# Patient Record
Sex: FEMALE | Race: BLACK OR AFRICAN AMERICAN | Employment: UNEMPLOYED | URBAN - METROPOLITAN AREA
[De-identification: names, ages, dates, MRNs, and addresses within clinical notes are randomized per-mention and may not be internally consistent; named-entity substitution may affect disease eponyms.]

---

## 2023-12-26 ENCOUNTER — HOSPITAL ENCOUNTER (EMERGENCY)
Age: 38
Discharge: HOME | End: 2023-12-26
Attending: EMERGENCY MEDICINE

## 2023-12-26 ENCOUNTER — APPOINTMENT (OUTPATIENT)
Dept: GENERAL RADIOLOGY | Age: 38
End: 2023-12-26

## 2023-12-26 VITALS
OXYGEN SATURATION: 99 % | BODY MASS INDEX: 45 KG/M2 | WEIGHT: 280 LBS | TEMPERATURE: 100.2 F | RESPIRATION RATE: 20 BRPM | HEIGHT: 66 IN | DIASTOLIC BLOOD PRESSURE: 109 MMHG | SYSTOLIC BLOOD PRESSURE: 142 MMHG | HEART RATE: 88 BPM

## 2023-12-26 DIAGNOSIS — U07.1 COVID-19: Primary | ICD-10-CM

## 2023-12-26 PROCEDURE — 99283 EMERGENCY DEPT VISIT LOW MDM: CPT

## 2023-12-26 PROCEDURE — 71046 X-RAY EXAM CHEST 2 VIEWS: CPT

## 2023-12-26 RX ORDER — ALBUTEROL SULFATE 90 UG/1
2 AEROSOL, METERED RESPIRATORY (INHALATION) 4 TIMES DAILY PRN
Qty: 18 G | Refills: 0 | Status: SHIPPED | OUTPATIENT
Start: 2023-12-26

## 2023-12-27 NOTE — ED NOTES
12/27/23  Attempted to contact pt re: ED visit 12/26/23; message left, encouraged to return to ED if any problems or concerns.

## 2024-03-09 ENCOUNTER — HOSPITAL ENCOUNTER (EMERGENCY)
Age: 39
Discharge: HOME | End: 2024-03-10
Attending: EMERGENCY MEDICINE
Payer: COMMERCIAL

## 2024-03-09 ENCOUNTER — APPOINTMENT (OUTPATIENT)
Dept: CT IMAGING | Age: 39
End: 2024-03-09
Payer: COMMERCIAL

## 2024-03-09 DIAGNOSIS — N30.00 ACUTE CYSTITIS WITHOUT HEMATURIA: ICD-10-CM

## 2024-03-09 DIAGNOSIS — G40.909 SEIZURE DISORDER (HCC): ICD-10-CM

## 2024-03-09 DIAGNOSIS — R56.9 SEIZURE (HCC): Primary | ICD-10-CM

## 2024-03-09 LAB
ALBUMIN SERPL-MCNC: 4.2 G/DL (ref 3.4–5)
ALBUMIN/GLOB SERPL: 1.6 {RATIO} (ref 1.1–2.2)
ALP SERPL-CCNC: 98 U/L (ref 40–129)
ALT SERPL-CCNC: 14 U/L (ref 10–40)
ANION GAP SERPL CALCULATED.3IONS-SCNC: 12 MMOL/L (ref 3–16)
ANISOCYTOSIS BLD QL SMEAR: ABNORMAL
AST SERPL-CCNC: 24 U/L (ref 15–37)
BACTERIA URNS QL MICRO: ABNORMAL /HPF
BASOPHILS # BLD: 0.1 K/UL (ref 0–0.2)
BASOPHILS NFR BLD: 2 %
BILIRUB SERPL-MCNC: <0.2 MG/DL (ref 0–1)
BILIRUB UR QL STRIP.AUTO: NEGATIVE
BUN SERPL-MCNC: 8 MG/DL (ref 7–20)
CALCIUM SERPL-MCNC: 8.9 MG/DL (ref 8.3–10.6)
CHLORIDE SERPL-SCNC: 103 MMOL/L (ref 99–110)
CLARITY UR: ABNORMAL
CO2 SERPL-SCNC: 21 MMOL/L (ref 21–32)
COLOR UR: ABNORMAL
CREAT SERPL-MCNC: 0.7 MG/DL (ref 0.6–1.1)
DEPRECATED RDW RBC AUTO: 17.1 % (ref 12.4–15.4)
EOSINOPHIL # BLD: 0.1 K/UL (ref 0–0.6)
EOSINOPHIL NFR BLD: 2 %
EPI CELLS #/AREA URNS AUTO: 10 /HPF (ref 0–5)
ETHANOLAMINE SERPL-MCNC: NORMAL MG/DL (ref 0–0.08)
GFR SERPLBLD CREATININE-BSD FMLA CKD-EPI: >60 ML/MIN/{1.73_M2}
GLUCOSE SERPL-MCNC: 97 MG/DL (ref 70–99)
GLUCOSE UR STRIP.AUTO-MCNC: NEGATIVE MG/DL
HCG SERPL QL: NEGATIVE
HCT VFR BLD AUTO: 39.7 % (ref 36–48)
HGB BLD-MCNC: 13.5 G/DL (ref 12–16)
HGB UR QL STRIP.AUTO: NEGATIVE
HYALINE CASTS #/AREA URNS AUTO: 4 /LPF (ref 0–8)
KETONES UR STRIP.AUTO-MCNC: ABNORMAL MG/DL
LEUKOCYTE ESTERASE UR QL STRIP.AUTO: NEGATIVE
LYMPHOCYTES # BLD: 2.6 K/UL (ref 1–5.1)
LYMPHOCYTES NFR BLD: 40.7 %
MACROCYTES BLD QL SMEAR: ABNORMAL
MCH RBC QN AUTO: 27.9 PG (ref 26–34)
MCHC RBC AUTO-ENTMCNC: 34 G/DL (ref 31–36)
MCV RBC AUTO: 82 FL (ref 80–100)
MICROCYTES BLD QL SMEAR: ABNORMAL
MONOCYTES # BLD: 0.9 K/UL (ref 0–1.3)
MONOCYTES NFR BLD: 13.8 %
NEUTROPHILS # BLD: 2.6 K/UL (ref 1.7–7.7)
NEUTROPHILS NFR BLD: 41.5 %
NITRITE UR QL STRIP.AUTO: NEGATIVE
PH UR STRIP.AUTO: 5.5 [PH] (ref 5–8)
PLATELET # BLD AUTO: 218 K/UL (ref 135–450)
PLATELET BLD QL SMEAR: ADEQUATE
PMV BLD AUTO: 7.6 FL (ref 5–10.5)
POTASSIUM SERPL-SCNC: 5.2 MMOL/L (ref 3.5–5.1)
PROT SERPL-MCNC: 6.9 G/DL (ref 6.4–8.2)
PROT UR STRIP.AUTO-MCNC: ABNORMAL MG/DL
RBC # BLD AUTO: 4.85 M/UL (ref 4–5.2)
RBC CLUMPS #/AREA URNS AUTO: 3 /HPF (ref 0–4)
REASON FOR REJECTION: NORMAL
REJECTED TEST: NORMAL
SLIDE REVIEW: ABNORMAL
SODIUM SERPL-SCNC: 136 MMOL/L (ref 136–145)
SP GR UR STRIP.AUTO: >=1.03 (ref 1–1.03)
UA COMPLETE W REFLEX CULTURE PNL UR: YES
UA DIPSTICK W REFLEX MICRO PNL UR: YES
URN SPEC COLLECT METH UR: ABNORMAL
UROBILINOGEN UR STRIP-ACNC: 1 E.U./DL
WBC # BLD AUTO: 6.3 K/UL (ref 4–11)
WBC #/AREA URNS AUTO: 17 /HPF (ref 0–5)

## 2024-03-09 PROCEDURE — 36415 COLL VENOUS BLD VENIPUNCTURE: CPT

## 2024-03-09 PROCEDURE — 93005 ELECTROCARDIOGRAM TRACING: CPT | Performed by: EMERGENCY MEDICINE

## 2024-03-09 PROCEDURE — 84703 CHORIONIC GONADOTROPIN ASSAY: CPT

## 2024-03-09 PROCEDURE — 80307 DRUG TEST PRSMV CHEM ANLYZR: CPT

## 2024-03-09 PROCEDURE — 85025 COMPLETE CBC W/AUTO DIFF WBC: CPT

## 2024-03-09 PROCEDURE — 6360000002 HC RX W HCPCS: Performed by: EMERGENCY MEDICINE

## 2024-03-09 PROCEDURE — 80053 COMPREHEN METABOLIC PANEL: CPT

## 2024-03-09 PROCEDURE — 96374 THER/PROPH/DIAG INJ IV PUSH: CPT | Performed by: EMERGENCY MEDICINE

## 2024-03-09 PROCEDURE — 87086 URINE CULTURE/COLONY COUNT: CPT

## 2024-03-09 PROCEDURE — 80177 DRUG SCRN QUAN LEVETIRACETAM: CPT

## 2024-03-09 PROCEDURE — 70450 CT HEAD/BRAIN W/O DYE: CPT

## 2024-03-09 PROCEDURE — 99284 EMERGENCY DEPT VISIT MOD MDM: CPT | Performed by: EMERGENCY MEDICINE

## 2024-03-09 PROCEDURE — 84146 ASSAY OF PROLACTIN: CPT

## 2024-03-09 PROCEDURE — 72125 CT NECK SPINE W/O DYE: CPT

## 2024-03-09 PROCEDURE — 81001 URINALYSIS AUTO W/SCOPE: CPT

## 2024-03-09 PROCEDURE — 82077 ASSAY SPEC XCP UR&BREATH IA: CPT

## 2024-03-09 RX ORDER — GABAPENTIN 600 MG/1
900 TABLET ORAL
COMMUNITY

## 2024-03-09 RX ORDER — LORATADINE 10 MG/1
10 CAPSULE, LIQUID FILLED ORAL DAILY
COMMUNITY

## 2024-03-09 RX ORDER — LEVETIRACETAM 250 MG/1
1000 TABLET ORAL 2 TIMES DAILY
COMMUNITY
End: 2024-03-10

## 2024-03-09 RX ORDER — GABAPENTIN 600 MG/1
1200 TABLET ORAL NIGHTLY
COMMUNITY
End: 2024-03-15

## 2024-03-09 RX ORDER — OMEPRAZOLE 20 MG/1
20 CAPSULE, DELAYED RELEASE ORAL DAILY
COMMUNITY

## 2024-03-09 RX ORDER — GABAPENTIN 600 MG/1
600 TABLET ORAL
COMMUNITY
End: 2024-03-15

## 2024-03-09 RX ORDER — 0.9 % SODIUM CHLORIDE 0.9 %
1000 INTRAVENOUS SOLUTION INTRAVENOUS ONCE
Status: COMPLETED | OUTPATIENT
Start: 2024-03-09 | End: 2024-03-10

## 2024-03-09 RX ORDER — LEVETIRACETAM 500 MG/5ML
1000 INJECTION, SOLUTION, CONCENTRATE INTRAVENOUS ONCE
Status: COMPLETED | OUTPATIENT
Start: 2024-03-09 | End: 2024-03-09

## 2024-03-09 RX ORDER — ACETAMINOPHEN 500 MG
1000 TABLET ORAL 2 TIMES DAILY PRN
COMMUNITY

## 2024-03-09 RX ORDER — IBUPROFEN 800 MG/1
800 TABLET ORAL 2 TIMES DAILY PRN
COMMUNITY

## 2024-03-09 RX ADMIN — LEVETIRACETAM 1000 MG: 100 INJECTION, SOLUTION INTRAVENOUS at 21:32

## 2024-03-10 VITALS
BODY MASS INDEX: 45 KG/M2 | RESPIRATION RATE: 15 BRPM | SYSTOLIC BLOOD PRESSURE: 122 MMHG | HEART RATE: 75 BPM | TEMPERATURE: 99 F | HEIGHT: 66 IN | DIASTOLIC BLOOD PRESSURE: 79 MMHG | WEIGHT: 280 LBS | OXYGEN SATURATION: 96 %

## 2024-03-10 LAB
AMMONIA PLAS-SCNC: 20 UMOL/L (ref 11–51)
AMPHETAMINES UR QL SCN>1000 NG/ML: ABNORMAL
BARBITURATES UR QL SCN>200 NG/ML: ABNORMAL
BENZODIAZ UR QL SCN>200 NG/ML: POSITIVE
CANNABINOIDS UR QL SCN>50 NG/ML: ABNORMAL
COCAINE UR QL SCN: ABNORMAL
DRUG SCREEN COMMENT UR-IMP: ABNORMAL
EKG ATRIAL RATE: 109 BPM
EKG DIAGNOSIS: NORMAL
EKG P AXIS: 61 DEGREES
EKG P-R INTERVAL: 158 MS
EKG Q-T INTERVAL: 352 MS
EKG QRS DURATION: 74 MS
EKG QTC CALCULATION (BAZETT): 474 MS
EKG R AXIS: -15 DEGREES
EKG T AXIS: 5 DEGREES
EKG VENTRICULAR RATE: 109 BPM
FENTANYL SCREEN, URINE: ABNORMAL
FLUAV RNA RESP QL NAA+PROBE: NOT DETECTED
FLUBV RNA RESP QL NAA+PROBE: NOT DETECTED
LEVETIRACETAM SERPL-MCNC: 15.5 UG/ML (ref 6–46)
MEDICATION DOSE-MCNC: NORMAL
METHADONE UR QL SCN>300 NG/ML: ABNORMAL
OPIATES UR QL SCN>300 NG/ML: ABNORMAL
OXYCODONE UR QL SCN: ABNORMAL
PCP UR QL SCN>25 NG/ML: ABNORMAL
PH UR STRIP: 5.5 [PH]
PROLACTIN SERPL IA-MCNC: 21.3 NG/ML
SARS-COV-2 RNA RESP QL NAA+PROBE: NOT DETECTED
TSH SERPL DL<=0.005 MIU/L-ACNC: 0.81 UIU/ML (ref 0.27–4.2)

## 2024-03-10 PROCEDURE — 6360000002 HC RX W HCPCS: Performed by: EMERGENCY MEDICINE

## 2024-03-10 PROCEDURE — 82140 ASSAY OF AMMONIA: CPT

## 2024-03-10 PROCEDURE — 87636 SARSCOV2 & INF A&B AMP PRB: CPT

## 2024-03-10 PROCEDURE — 96374 THER/PROPH/DIAG INJ IV PUSH: CPT | Performed by: EMERGENCY MEDICINE

## 2024-03-10 PROCEDURE — 84443 ASSAY THYROID STIM HORMONE: CPT

## 2024-03-10 PROCEDURE — 2580000003 HC RX 258: Performed by: EMERGENCY MEDICINE

## 2024-03-10 PROCEDURE — 93010 ELECTROCARDIOGRAM REPORT: CPT | Performed by: INTERNAL MEDICINE

## 2024-03-10 RX ORDER — CEPHALEXIN 500 MG/1
500 CAPSULE ORAL 4 TIMES DAILY
Qty: 28 CAPSULE | Refills: 0 | Status: SHIPPED | OUTPATIENT
Start: 2024-03-10 | End: 2024-03-17

## 2024-03-10 RX ORDER — LEVETIRACETAM 250 MG/1
1000 TABLET ORAL 2 TIMES DAILY
Qty: 160 TABLET | Refills: 0 | Status: SHIPPED | OUTPATIENT
Start: 2024-03-10 | End: 2024-03-30

## 2024-03-10 RX ORDER — KETOROLAC TROMETHAMINE 15 MG/ML
15 INJECTION, SOLUTION INTRAMUSCULAR; INTRAVENOUS ONCE
Status: COMPLETED | OUTPATIENT
Start: 2024-03-10 | End: 2024-03-10

## 2024-03-10 RX ADMIN — KETOROLAC TROMETHAMINE 15 MG: 15 INJECTION, SOLUTION INTRAMUSCULAR; INTRAVENOUS at 00:35

## 2024-03-10 RX ADMIN — SODIUM CHLORIDE 1000 ML: 9 INJECTION, SOLUTION INTRAVENOUS at 00:28

## 2024-03-10 ASSESSMENT — PAIN DESCRIPTION - LOCATION: LOCATION: HEAD

## 2024-03-10 ASSESSMENT — PAIN SCALES - GENERAL: PAINLEVEL_OUTOF10: 10

## 2024-03-10 ASSESSMENT — PAIN DESCRIPTION - DESCRIPTORS: DESCRIPTORS: ACHING

## 2024-03-10 NOTE — ED PROVIDER NOTES
Emergency Physician Note  [facility] EMERGENCY DEPARTMENT    Pt Name: Cely Ochoa  MRN: [id number]  Birthdate 1985  Date of evaluation: 3/9/2024  Provider: Lupillo Hawk MD  PCP: No primary care provider on file.    Note Open Time: 9:28 PM EST 3/9/24    Chief Complaint  Seizures (Witnessed seizures, given 10 mg Versed IM by EMS)       History of Present Illness  Cely Ochoa is a 38 y.o. female who presents to the ED for seizure.  EMS reports they were called for witnessed seizure.  Patient's wife reports a seizure while in the car that lasted a minute or so and then another 1 that occurred on the way into the home.  Apparently the second 1 did involve a fall and possible injury.  Patient is reportedly compliant with all medicines and has a history of seizure disorder but last generalized tonic-clonic seizure was in the summer of last year.  EMS delivered 10 mg of Versed intramuscular and route to the ER.    History from : Family wife and EMS    Limitations to history : Altered Mental Status    REVIEW OF SYSTEMS :      Review of Systems    Positives and Pertinent negatives as per HPI.     Medications/allergies/medical/social/family history  I have reviewed the following from the nursing documentation:      Prior to Admission medications    Medication Sig Start Date End Date Taking? Authorizing Provider   albuterol sulfate HFA (VENTOLIN HFA) 108 (90 Base) MCG/ACT inhaler Inhale 2 puffs into the lungs 4 times daily as needed for Wheezing 12/26/23   Anuj Pardo MD       Allergies as of 03/09/2024 - Fully Reviewed 03/09/2024   Allergen Reaction Noted    Latex Hives 03/26/2022    Penicillins Hives 03/26/2022    Sulfa antibiotics Hives 03/26/2022       Past Medical History:   Diagnosis Date    Meningitis     Seizures (HCC)     Stroke (HCC)         Surgical History:   Past Surgical History:   Procedure Laterality Date    SKIN BIOPSY      TONSILLECTOMY          Family History:  No family

## 2024-03-11 LAB — BACTERIA UR CULT: NORMAL

## 2024-03-15 ENCOUNTER — OFFICE VISIT (OUTPATIENT)
Dept: INTERNAL MEDICINE CLINIC | Age: 39
End: 2024-03-15
Payer: COMMERCIAL

## 2024-03-15 VITALS
WEIGHT: 290 LBS | HEART RATE: 88 BPM | BODY MASS INDEX: 46.61 KG/M2 | HEIGHT: 66 IN | DIASTOLIC BLOOD PRESSURE: 86 MMHG | SYSTOLIC BLOOD PRESSURE: 120 MMHG | OXYGEN SATURATION: 97 %

## 2024-03-15 DIAGNOSIS — M25.561 CHRONIC PAIN OF BOTH KNEES: ICD-10-CM

## 2024-03-15 DIAGNOSIS — M21.372 LEFT FOOT DROP: ICD-10-CM

## 2024-03-15 DIAGNOSIS — Z86.73 HISTORY OF STROKE: ICD-10-CM

## 2024-03-15 DIAGNOSIS — F17.200 CURRENT SMOKER: ICD-10-CM

## 2024-03-15 DIAGNOSIS — G89.29 CHRONIC PAIN OF BOTH KNEES: ICD-10-CM

## 2024-03-15 DIAGNOSIS — F33.1 MODERATE EPISODE OF RECURRENT MAJOR DEPRESSIVE DISORDER (HCC): ICD-10-CM

## 2024-03-15 DIAGNOSIS — J45.20 ALLERGY-INDUCED ASTHMA, MILD INTERMITTENT, UNCOMPLICATED: ICD-10-CM

## 2024-03-15 DIAGNOSIS — G89.29 OTHER CHRONIC PAIN: ICD-10-CM

## 2024-03-15 DIAGNOSIS — G40.909 SEIZURE DISORDER (HCC): Primary | ICD-10-CM

## 2024-03-15 DIAGNOSIS — R60.0 FLUID RETENTION IN LEGS: ICD-10-CM

## 2024-03-15 DIAGNOSIS — M15.9 PRIMARY OSTEOARTHRITIS INVOLVING MULTIPLE JOINTS: ICD-10-CM

## 2024-03-15 DIAGNOSIS — F41.1 GAD (GENERALIZED ANXIETY DISORDER): ICD-10-CM

## 2024-03-15 DIAGNOSIS — M25.562 CHRONIC PAIN OF BOTH KNEES: ICD-10-CM

## 2024-03-15 PROBLEM — F41.9 ANXIETY: Status: ACTIVE | Noted: 2022-03-28

## 2024-03-15 PROBLEM — F41.9 ANXIETY: Status: RESOLVED | Noted: 2022-03-28 | Resolved: 2024-03-15

## 2024-03-15 PROBLEM — M15.0 PRIMARY OSTEOARTHRITIS INVOLVING MULTIPLE JOINTS: Status: ACTIVE | Noted: 2024-03-15

## 2024-03-15 PROBLEM — M79.672 PAIN IN BOTH FEET: Status: ACTIVE | Noted: 2022-03-28

## 2024-03-15 PROBLEM — R91.8 PULMONARY NODULES: Status: ACTIVE | Noted: 2024-03-15

## 2024-03-15 PROBLEM — M79.671 PAIN IN BOTH FEET: Status: ACTIVE | Noted: 2022-03-28

## 2024-03-15 LAB
ALBUMIN SERPL-MCNC: 4.3 G/DL (ref 3.4–5)
ALBUMIN/GLOB SERPL: 1.6 {RATIO} (ref 1.1–2.2)
ALP SERPL-CCNC: 104 U/L (ref 40–129)
ALT SERPL-CCNC: 11 U/L (ref 10–40)
ANION GAP SERPL CALCULATED.3IONS-SCNC: 9 MMOL/L (ref 3–16)
AST SERPL-CCNC: 11 U/L (ref 15–37)
BILIRUB SERPL-MCNC: <0.2 MG/DL (ref 0–1)
BUN SERPL-MCNC: 8 MG/DL (ref 7–20)
CALCIUM SERPL-MCNC: 9.2 MG/DL (ref 8.3–10.6)
CHLORIDE SERPL-SCNC: 105 MMOL/L (ref 99–110)
CHOLEST SERPL-MCNC: 196 MG/DL (ref 0–199)
CO2 SERPL-SCNC: 25 MMOL/L (ref 21–32)
CREAT SERPL-MCNC: 0.6 MG/DL (ref 0.6–1.1)
GFR SERPLBLD CREATININE-BSD FMLA CKD-EPI: >60 ML/MIN/{1.73_M2}
GLUCOSE SERPL-MCNC: 86 MG/DL (ref 70–99)
HDLC SERPL-MCNC: 46 MG/DL (ref 40–60)
LDLC SERPL CALC-MCNC: 132 MG/DL
POTASSIUM SERPL-SCNC: 4.1 MMOL/L (ref 3.5–5.1)
PROT SERPL-MCNC: 7 G/DL (ref 6.4–8.2)
SODIUM SERPL-SCNC: 139 MMOL/L (ref 136–145)
TRIGL SERPL-MCNC: 90 MG/DL (ref 0–150)
VLDLC SERPL CALC-MCNC: 18 MG/DL

## 2024-03-15 PROCEDURE — G8427 DOCREV CUR MEDS BY ELIG CLIN: HCPCS | Performed by: INTERNAL MEDICINE

## 2024-03-15 PROCEDURE — 99204 OFFICE O/P NEW MOD 45 MIN: CPT | Performed by: INTERNAL MEDICINE

## 2024-03-15 PROCEDURE — 4004F PT TOBACCO SCREEN RCVD TLK: CPT | Performed by: INTERNAL MEDICINE

## 2024-03-15 PROCEDURE — G8484 FLU IMMUNIZE NO ADMIN: HCPCS | Performed by: INTERNAL MEDICINE

## 2024-03-15 PROCEDURE — G8417 CALC BMI ABV UP PARAM F/U: HCPCS | Performed by: INTERNAL MEDICINE

## 2024-03-15 RX ORDER — BUTALBITAL, ACETAMINOPHEN AND CAFFEINE 50; 325; 40 MG/1; MG/1; MG/1
TABLET ORAL
COMMUNITY
End: 2024-03-15

## 2024-03-15 RX ORDER — TRAZODONE HYDROCHLORIDE 50 MG/1
50 TABLET ORAL NIGHTLY
Qty: 30 TABLET | Refills: 3 | Status: SHIPPED | OUTPATIENT
Start: 2024-03-15

## 2024-03-15 RX ORDER — HYDROCODONE BITARTRATE AND ACETAMINOPHEN 5; 325 MG/1; MG/1
TABLET ORAL
COMMUNITY
End: 2024-03-15

## 2024-03-15 RX ORDER — METHOCARBAMOL 750 MG/1
TABLET, FILM COATED ORAL
COMMUNITY

## 2024-03-15 RX ORDER — HYDROCHLOROTHIAZIDE 12.5 MG/1
12.5 TABLET ORAL DAILY
Qty: 30 TABLET | Refills: 3 | Status: SHIPPED | OUTPATIENT
Start: 2024-03-15

## 2024-03-15 RX ORDER — HYDROCHLOROTHIAZIDE 12.5 MG/1
1 TABLET ORAL DAILY
COMMUNITY
End: 2024-03-15 | Stop reason: SDUPTHER

## 2024-03-15 RX ORDER — TRAZODONE HYDROCHLORIDE 50 MG/1
TABLET ORAL
COMMUNITY
Start: 2021-08-24 | End: 2024-03-15 | Stop reason: SDUPTHER

## 2024-03-15 RX ORDER — TRAMADOL HYDROCHLORIDE 50 MG/1
1 TABLET ORAL 2 TIMES DAILY
COMMUNITY

## 2024-03-15 SDOH — ECONOMIC STABILITY: HOUSING INSECURITY
IN THE LAST 12 MONTHS, WAS THERE A TIME WHEN YOU DID NOT HAVE A STEADY PLACE TO SLEEP OR SLEPT IN A SHELTER (INCLUDING NOW)?: NO

## 2024-03-15 SDOH — ECONOMIC STABILITY: FOOD INSECURITY: WITHIN THE PAST 12 MONTHS, YOU WORRIED THAT YOUR FOOD WOULD RUN OUT BEFORE YOU GOT MONEY TO BUY MORE.: NEVER TRUE

## 2024-03-15 SDOH — ECONOMIC STABILITY: INCOME INSECURITY: HOW HARD IS IT FOR YOU TO PAY FOR THE VERY BASICS LIKE FOOD, HOUSING, MEDICAL CARE, AND HEATING?: NOT HARD AT ALL

## 2024-03-15 SDOH — HEALTH STABILITY: PHYSICAL HEALTH: ON AVERAGE, HOW MANY DAYS PER WEEK DO YOU ENGAGE IN MODERATE TO STRENUOUS EXERCISE (LIKE A BRISK WALK)?: 1 DAY

## 2024-03-15 SDOH — ECONOMIC STABILITY: FOOD INSECURITY: WITHIN THE PAST 12 MONTHS, THE FOOD YOU BOUGHT JUST DIDN'T LAST AND YOU DIDN'T HAVE MONEY TO GET MORE.: NEVER TRUE

## 2024-03-15 SDOH — HEALTH STABILITY: PHYSICAL HEALTH: ON AVERAGE, HOW MANY MINUTES DO YOU ENGAGE IN EXERCISE AT THIS LEVEL?: 10 MIN

## 2024-03-15 ASSESSMENT — ENCOUNTER SYMPTOMS
SHORTNESS OF BREATH: 0
ABDOMINAL PAIN: 0
CHEST TIGHTNESS: 0
SORE THROAT: 0
BACK PAIN: 0
VOMITING: 0
COUGH: 0
NAUSEA: 0
CONSTIPATION: 0
WHEEZING: 0
COLOR CHANGE: 0

## 2024-03-15 ASSESSMENT — PATIENT HEALTH QUESTIONNAIRE - PHQ9
3. TROUBLE FALLING OR STAYING ASLEEP: 3
SUM OF ALL RESPONSES TO PHQ QUESTIONS 1-9: 19
5. POOR APPETITE OR OVEREATING: 3
6. FEELING BAD ABOUT YOURSELF - OR THAT YOU ARE A FAILURE OR HAVE LET YOURSELF OR YOUR FAMILY DOWN: 0
SUM OF ALL RESPONSES TO PHQ9 QUESTIONS 1 & 2: 6
4. FEELING TIRED OR HAVING LITTLE ENERGY: 3
7. TROUBLE CONCENTRATING ON THINGS, SUCH AS READING THE NEWSPAPER OR WATCHING TELEVISION: 3
8. MOVING OR SPEAKING SO SLOWLY THAT OTHER PEOPLE COULD HAVE NOTICED. OR THE OPPOSITE, BEING SO FIGETY OR RESTLESS THAT YOU HAVE BEEN MOVING AROUND A LOT MORE THAN USUAL: 1
10. IF YOU CHECKED OFF ANY PROBLEMS, HOW DIFFICULT HAVE THESE PROBLEMS MADE IT FOR YOU TO DO YOUR WORK, TAKE CARE OF THINGS AT HOME, OR GET ALONG WITH OTHER PEOPLE: 1
2. FEELING DOWN, DEPRESSED OR HOPELESS: 3
SUM OF ALL RESPONSES TO PHQ QUESTIONS 1-9: 19
9. THOUGHTS THAT YOU WOULD BE BETTER OFF DEAD, OR OF HURTING YOURSELF: 0
1. LITTLE INTEREST OR PLEASURE IN DOING THINGS: 3

## 2024-03-15 NOTE — ASSESSMENT & PLAN NOTE
most likely secondary to obesity, will refill as needed hydrochlorothiazide, update labs, encouraged salt restriction and weight reduction

## 2024-03-15 NOTE — PROGRESS NOTES
software.  Although all attempts are made to edit the dictation for accuracy, there may be errors in the transcription that are not intended.

## 2024-03-15 NOTE — ASSESSMENT & PLAN NOTE
exam within normal findings, counseled regarding need for smoking cessation, continue as needed albuterol, ensure completing flu vaccine early in the fall, will obtain prior vaccination records from previous PCP in [place]

## 2024-03-15 NOTE — ASSESSMENT & PLAN NOTE
restart Zoloft and trazodone which she used to help sleep, will place referral to behavioral health to restart counseling

## 2024-03-15 NOTE — ASSESSMENT & PLAN NOTE
explained to patient will not refill hydrocodone or tramadol, she is requesting referral to pain management although she is currently on ibuprofen and Robaxin, will refer to Ortho again for her knee pain to evaluate if candidate for steroid injections since she reports she did complete physical therapy down in [place].  She needs to adhere to daily stretches and physical therapy exercises at home along with attempting weight loss

## 2024-03-15 NOTE — ASSESSMENT & PLAN NOTE
lab results done in ER noted without significant findings, she will stay on current dose of Keppra however advised will need to reestablish with neurology for medication management and adjustment if needed since she continues to get breakthrough seizures.  Continue to avoid alcohol consumption and recreational drug use

## 2024-03-16 LAB
EST. AVERAGE GLUCOSE BLD GHB EST-MCNC: 102.5 MG/DL
HBA1C MFR BLD: 5.2 %

## 2024-04-19 SDOH — HEALTH STABILITY: PHYSICAL HEALTH: ON AVERAGE, HOW MANY DAYS PER WEEK DO YOU ENGAGE IN MODERATE TO STRENUOUS EXERCISE (LIKE A BRISK WALK)?: 1 DAY

## 2024-04-19 SDOH — HEALTH STABILITY: PHYSICAL HEALTH: ON AVERAGE, HOW MANY MINUTES DO YOU ENGAGE IN EXERCISE AT THIS LEVEL?: 20 MIN

## 2024-04-22 ENCOUNTER — OFFICE VISIT (OUTPATIENT)
Dept: INTERNAL MEDICINE CLINIC | Age: 39
End: 2024-04-22

## 2024-04-22 VITALS
BODY MASS INDEX: 46.03 KG/M2 | HEART RATE: 98 BPM | DIASTOLIC BLOOD PRESSURE: 82 MMHG | WEIGHT: 285.2 LBS | OXYGEN SATURATION: 99 % | SYSTOLIC BLOOD PRESSURE: 118 MMHG

## 2024-04-22 DIAGNOSIS — K21.9 GASTROESOPHAGEAL REFLUX DISEASE WITHOUT ESOPHAGITIS: ICD-10-CM

## 2024-04-22 DIAGNOSIS — Z23 NEED FOR VACCINATION: ICD-10-CM

## 2024-04-22 DIAGNOSIS — R60.0 FLUID RETENTION IN LEGS: ICD-10-CM

## 2024-04-22 DIAGNOSIS — J45.20 ALLERGY-INDUCED ASTHMA, MILD INTERMITTENT, UNCOMPLICATED: ICD-10-CM

## 2024-04-22 DIAGNOSIS — G40.909 SEIZURE DISORDER (HCC): ICD-10-CM

## 2024-04-22 DIAGNOSIS — F33.1 MODERATE EPISODE OF RECURRENT MAJOR DEPRESSIVE DISORDER (HCC): ICD-10-CM

## 2024-04-22 DIAGNOSIS — Z23 NEED FOR TETANUS BOOSTER: ICD-10-CM

## 2024-04-22 DIAGNOSIS — Z11.59 NEED FOR HEPATITIS C SCREENING TEST: ICD-10-CM

## 2024-04-22 DIAGNOSIS — L30.8 OTHER ECZEMA: ICD-10-CM

## 2024-04-22 DIAGNOSIS — M35.9 AUTOIMMUNE DISEASE (HCC): Primary | ICD-10-CM

## 2024-04-22 DIAGNOSIS — Z11.4 ENCOUNTER FOR SCREENING FOR HIV: ICD-10-CM

## 2024-04-22 DIAGNOSIS — G89.29 OTHER CHRONIC PAIN: ICD-10-CM

## 2024-04-22 DIAGNOSIS — F41.1 GAD (GENERALIZED ANXIETY DISORDER): ICD-10-CM

## 2024-04-22 PROBLEM — I69.30 HISTORY OF HEMORRHAGIC CEREBROVASCULAR ACCIDENT (CVA) WITH RESIDUAL DEFICIT: Status: ACTIVE | Noted: 2024-03-15

## 2024-04-22 PROBLEM — R29.898 WEAKNESS OF LEFT UPPER EXTREMITY: Status: ACTIVE | Noted: 2024-04-22

## 2024-04-22 PROBLEM — I61.9 CEREBROVASCULAR ACCIDENT, HEMORRHAGIC (HCC): Status: ACTIVE | Noted: 2024-03-15

## 2024-04-22 RX ORDER — TRAMADOL HYDROCHLORIDE 50 MG/1
50 TABLET ORAL 2 TIMES DAILY
Qty: 26 TABLET | Refills: 0 | Status: CANCELLED | OUTPATIENT
Start: 2024-04-22 | End: 2024-05-05

## 2024-04-22 RX ORDER — TRAZODONE HYDROCHLORIDE 50 MG/1
50 TABLET ORAL NIGHTLY
Qty: 90 TABLET | Refills: 1 | Status: SHIPPED | OUTPATIENT
Start: 2024-04-22 | End: 2024-10-19

## 2024-04-22 RX ORDER — HYDROCHLOROTHIAZIDE 12.5 MG/1
12.5 TABLET ORAL DAILY
Qty: 30 TABLET | Refills: 2 | Status: SHIPPED | OUTPATIENT
Start: 2024-04-22 | End: 2024-07-21

## 2024-04-22 RX ORDER — METHOCARBAMOL 750 MG/1
750 TABLET, FILM COATED ORAL 3 TIMES DAILY PRN
Qty: 90 TABLET | Refills: 1 | Status: SHIPPED | OUTPATIENT
Start: 2024-04-22 | End: 2024-07-21

## 2024-04-22 RX ORDER — LEVETIRACETAM 250 MG/1
1000 TABLET ORAL 2 TIMES DAILY
Qty: 728 TABLET | Refills: 1 | Status: SHIPPED | OUTPATIENT
Start: 2024-04-22 | End: 2024-10-21

## 2024-04-22 RX ORDER — SERTRALINE HYDROCHLORIDE 100 MG/1
100 TABLET, FILM COATED ORAL DAILY
Qty: 180 TABLET | Refills: 0 | Status: SHIPPED | OUTPATIENT
Start: 2024-04-22 | End: 2024-10-19

## 2024-04-22 RX ORDER — LORATADINE 10 MG/1
10 CAPSULE, LIQUID FILLED ORAL DAILY
Qty: 30 CAPSULE | Refills: 1 | Status: SHIPPED | OUTPATIENT
Start: 2024-04-22 | End: 2024-06-21

## 2024-04-22 RX ORDER — IBUPROFEN 800 MG/1
800 TABLET ORAL 2 TIMES DAILY PRN
Qty: 120 TABLET | Refills: 1 | Status: SHIPPED | OUTPATIENT
Start: 2024-04-22 | End: 2024-08-20

## 2024-04-22 RX ORDER — GABAPENTIN 600 MG/1
900 TABLET ORAL
Qty: 90 TABLET | Refills: 1 | Status: SHIPPED | OUTPATIENT
Start: 2024-04-22 | End: 2024-08-20

## 2024-04-22 RX ORDER — ACETAMINOPHEN 500 MG
1000 TABLET ORAL 2 TIMES DAILY PRN
Qty: 120 TABLET | Refills: 1 | Status: SHIPPED | OUTPATIENT
Start: 2024-04-22 | End: 2024-07-22

## 2024-04-22 RX ORDER — OMEPRAZOLE 20 MG/1
20 CAPSULE, DELAYED RELEASE ORAL DAILY
Qty: 90 CAPSULE | Refills: 1 | Status: SHIPPED | OUTPATIENT
Start: 2024-04-22 | End: 2024-10-19

## 2024-04-22 RX ORDER — ALBUTEROL SULFATE 90 UG/1
2 AEROSOL, METERED RESPIRATORY (INHALATION) 4 TIMES DAILY PRN
Qty: 18 G | Refills: 1 | Status: SHIPPED | OUTPATIENT
Start: 2024-04-22 | End: 2024-07-22

## 2024-04-22 ASSESSMENT — ANXIETY QUESTIONNAIRES
1. FEELING NERVOUS, ANXIOUS, OR ON EDGE: MORE THAN HALF THE DAYS
3. WORRYING TOO MUCH ABOUT DIFFERENT THINGS: NEARLY EVERY DAY
2. NOT BEING ABLE TO STOP OR CONTROL WORRYING: NEARLY EVERY DAY
4. TROUBLE RELAXING: NEARLY EVERY DAY
5. BEING SO RESTLESS THAT IT IS HARD TO SIT STILL: MORE THAN HALF THE DAYS
GAD7 TOTAL SCORE: 17
IF YOU CHECKED OFF ANY PROBLEMS ON THIS QUESTIONNAIRE, HOW DIFFICULT HAVE THESE PROBLEMS MADE IT FOR YOU TO DO YOUR WORK, TAKE CARE OF THINGS AT HOME, OR GET ALONG WITH OTHER PEOPLE: SOMEWHAT DIFFICULT
7. FEELING AFRAID AS IF SOMETHING AWFUL MIGHT HAPPEN: MORE THAN HALF THE DAYS

## 2024-04-22 ASSESSMENT — PATIENT HEALTH QUESTIONNAIRE - PHQ9
SUM OF ALL RESPONSES TO PHQ QUESTIONS 1-9: 12
SUM OF ALL RESPONSES TO PHQ QUESTIONS 1-9: 12
10. IF YOU CHECKED OFF ANY PROBLEMS, HOW DIFFICULT HAVE THESE PROBLEMS MADE IT FOR YOU TO DO YOUR WORK, TAKE CARE OF THINGS AT HOME, OR GET ALONG WITH OTHER PEOPLE: SOMEWHAT DIFFICULT
9. THOUGHTS THAT YOU WOULD BE BETTER OFF DEAD, OR OF HURTING YOURSELF: NOT AT ALL
6. FEELING BAD ABOUT YOURSELF - OR THAT YOU ARE A FAILURE OR HAVE LET YOURSELF OR YOUR FAMILY DOWN: NOT AT ALL
5. POOR APPETITE OR OVEREATING: SEVERAL DAYS
SUM OF ALL RESPONSES TO PHQ QUESTIONS 1-9: 12
8. MOVING OR SPEAKING SO SLOWLY THAT OTHER PEOPLE COULD HAVE NOTICED. OR THE OPPOSITE, BEING SO FIGETY OR RESTLESS THAT YOU HAVE BEEN MOVING AROUND A LOT MORE THAN USUAL: NOT AT ALL
SUM OF ALL RESPONSES TO PHQ9 QUESTIONS 1 & 2: 4
SUM OF ALL RESPONSES TO PHQ QUESTIONS 1-9: 12
3. TROUBLE FALLING OR STAYING ASLEEP: MORE THAN HALF THE DAYS
4. FEELING TIRED OR HAVING LITTLE ENERGY: MORE THAN HALF THE DAYS
7. TROUBLE CONCENTRATING ON THINGS, SUCH AS READING THE NEWSPAPER OR WATCHING TELEVISION: NEARLY EVERY DAY
1. LITTLE INTEREST OR PLEASURE IN DOING THINGS: MORE THAN HALF THE DAYS
2. FEELING DOWN, DEPRESSED OR HOPELESS: MORE THAN HALF THE DAYS

## 2024-04-22 NOTE — PATIENT INSTRUCTIONS
diabetes.  - 147% increased risk of cardiovascular events like heart attack and stroke.  - 90% increased risk of death from cardiovascular events.  - 49% increased risk of death from any cause.    THE SCIENCE  Sitting can be so relaxing.  Why is it so bad?  Here’s what happens when you spend too much time sitting:    - Blood flow slows down.  This can allow fatty acids to build up in the blood vessels, leading to heart disease.  - Sitting for extended periods of time, regularly may lead to insulin resistance which can cause type 2 diabetes and obesity--2 major risk factors for heart disease.  - A 2018 study found that 82% of people who suffer from blood clots, sat for a significantly greater amount of time than the remaining 18%.  - Your body’s ability to process fats is slowed.  When you sit, your body’s production of lipoprotein lipase (an enzyme essential for breaking down fat) drops by about 90%.  When your body cannot break down fat, it is stored instead.    THE SOLUTION  Sitting is inevitable! Here’s how you can fuchs off any negative side effects:    Set a timer. Get up every hour and move. Stand, walk around, stretch.  You can even download apps onto your phone to remind you!  2. Watch your posture.  Poor posture can lead to bone damage, decreased blood circulation, fatigue, and loss of muscle strength.  If you must sit, keep your shoulders back, your chin tucked, and your stomach pulled toward your spine in order to keep muscles engaged, bones aligned, and circulation flowing.  3. Take a stand. If you’re able, why not opt for a standing desk?  Not only will your heart thank you, but standing desks have been proven to increase brain function, creativity, and productivity.  4. Work it out. Commit to exercising every single day. Go on a walk during lunch.  Plan to attend a fitness class.  Choose the far parking spot.  Every minute of physical activity counts!      In recent years, it has been said that “sitting

## 2024-04-22 NOTE — PROGRESS NOTES
(before breakfast) for 120 days. 1.5 tablets in the morning, 1 tablet in the evening, 2 tablets  at night 4/22/24 8/20/24 Yes Leigha Meneses DO   ibuprofen (ADVIL;MOTRIN) 800 MG tablet Take 1 tablet by mouth 2 times daily as needed for Pain 4/22/24 8/20/24 Yes Leigha Meneses DO   loratadine (CLARITIN) 10 MG capsule Take 1 capsule by mouth daily 4/22/24 6/21/24 Yes Leigha Meneses DO   omeprazole (PRILOSEC) 20 MG delayed release capsule Take 1 capsule by mouth daily 4/22/24 10/19/24 Yes Leigha Meneses DO   methocarbamol (ROBAXIN) 750 MG tablet Take 1 tablet by mouth 3 times daily as needed (muuscle spasms) 4/22/24 7/21/24 Yes Leigha Meneses DO   sertraline (ZOLOFT) 100 MG tablet Take 1 tablet by mouth daily 4/22/24 10/19/24 Yes Leigha Meneses DO     Social History     Socioeconomic History    Marital status:      Spouse name: Not on file    Number of children: Not on file    Years of education: Not on file    Highest education level: Not on file   Occupational History    Not on file   Tobacco Use    Smoking status: Some Days     Types: Cigars     Start date: 2019    Smokeless tobacco: Never   Vaping Use    Vaping Use: Never used   Substance and Sexual Activity    Alcohol use: Not Currently    Drug use: Yes     Types: Marijuana (Weed)     Comment: edibles for seizures    Sexual activity: Not Currently     Partners: Female   Other Topics Concern    Not on file   Social History Narrative    Not on file     Social Determinants of Health     Financial Resource Strain: Low Risk  (3/15/2024)    Overall Financial Resource Strain (CARDIA)     Difficulty of Paying Living Expenses: Not hard at all   Food Insecurity: No Food Insecurity (3/15/2024)    Hunger Vital Sign     Worried About Running Out of Food in the Last Year: Never true     Ran Out of Food in the Last Year: Never true   Transportation Needs: Unknown (3/15/2024)    PRAPARE - Transportation     Lack of Transportation (Medical): Not on file     Lack of

## 2024-04-23 DIAGNOSIS — Z23 NEED FOR VACCINATION: ICD-10-CM

## 2024-04-23 DIAGNOSIS — Z11.4 ENCOUNTER FOR SCREENING FOR HIV: ICD-10-CM

## 2024-04-23 DIAGNOSIS — Z11.59 NEED FOR HEPATITIS C SCREENING TEST: ICD-10-CM

## 2024-04-23 LAB — HCV AB SERPL QL IA: NORMAL

## 2024-04-24 LAB
HIV 1+2 AB+HIV1 P24 AG SERPL QL IA: NORMAL
HIV 2 AB SERPL QL IA: NORMAL
HIV1 AB SERPL QL IA: NORMAL
HIV1 P24 AG SERPL QL IA: NORMAL
VZV IGG SER QL IA: NORMAL

## 2024-05-22 ENCOUNTER — OFFICE VISIT (OUTPATIENT)
Dept: NEUROLOGY | Age: 39
End: 2024-05-22
Payer: MEDICARE

## 2024-05-22 VITALS
SYSTOLIC BLOOD PRESSURE: 141 MMHG | HEART RATE: 107 BPM | WEIGHT: 285 LBS | DIASTOLIC BLOOD PRESSURE: 94 MMHG | BODY MASS INDEX: 46 KG/M2

## 2024-05-22 DIAGNOSIS — G40.909 SEIZURE DISORDER (HCC): Primary | ICD-10-CM

## 2024-05-22 PROCEDURE — 99203 OFFICE O/P NEW LOW 30 MIN: CPT

## 2024-05-22 NOTE — PATIENT INSTRUCTIONS

## 2024-05-22 NOTE — PROGRESS NOTES
The patient is a 38 y.o. years old female who  was referred by Denver Orosco MD for consultation regarding seizure disorder    HPI:    Patient is here with her significant other who provides collateral information.  Patient reports she has had her first seizure back in 2017.  Patient reports she was seen in [facility] back in 2017 when her first seizure occurred.  In 2019 patient had a left side weakness where she was diagnosed with \"pseudo stroke\".  Patient had MRI brain at that time which was unremarkable, per report.  Patient reports she has had EEGs again per report were normal.  I am unable to view these records.  Patient was started on Keppra 1000 mg twice daily.  Degree severe duration minutes.  Patient reports she has had 2 different types of episodes.      Type I description: Generalized body jerking.  Some episodes include tongue biting bowel and bladder incontinence.  Duration can vary from few to several minutes.  Patient noted to have confusion afterwards but back to normal after several minutes.    Type II description: Staring off with unresponsiveness, no LOC followed by confusion.  Patient reports she is aware with some of these types of episodes.    Patient reports her her last type I episode was in March 2024.  Patient reports during this time it was a stressful situation with significant other getting diagnosed with cancer and other family members having medical issues.  Patient reports been having type II episodes every day.  She reports she has had any recent imaging or EEG since she moved to [place].  Patient also has been being worked up for possible lupus given family history.  She has been referred to rheumatology.      ROS : A 10-14 system review of constitutional, cardiovascular, respiratory, GI, eyes, , ENT, musculoskeletal, endocrine, skin, SHEENT, genitourinary, psychiatric and neurologic systems was obtained and updated today and is unremarkable except as mentioned in my

## 2024-06-04 ENCOUNTER — TELEPHONE (OUTPATIENT)
Dept: INTERNAL MEDICINE CLINIC | Age: 39
End: 2024-06-04

## 2024-06-04 ENCOUNTER — OFFICE VISIT (OUTPATIENT)
Dept: INTERNAL MEDICINE CLINIC | Age: 39
End: 2024-06-04
Payer: MEDICARE

## 2024-06-04 VITALS
HEART RATE: 105 BPM | OXYGEN SATURATION: 99 % | BODY MASS INDEX: 45.84 KG/M2 | SYSTOLIC BLOOD PRESSURE: 114 MMHG | DIASTOLIC BLOOD PRESSURE: 82 MMHG | WEIGHT: 284 LBS

## 2024-06-04 DIAGNOSIS — L51.9 ERYTHEMA MULTIFORME: Primary | ICD-10-CM

## 2024-06-04 PROCEDURE — 99214 OFFICE O/P EST MOD 30 MIN: CPT | Performed by: STUDENT IN AN ORGANIZED HEALTH CARE EDUCATION/TRAINING PROGRAM

## 2024-06-04 RX ORDER — PREDNISONE 10 MG/1
TABLET ORAL
Qty: 98 TABLET | Refills: 0 | Status: SHIPPED | OUTPATIENT
Start: 2024-06-04 | End: 2024-07-09

## 2024-06-04 NOTE — PROGRESS NOTES
Yes Leigha Meneses DO   loratadine (CLARITIN) 10 MG capsule Take 1 capsule by mouth daily 4/22/24 6/21/24 Yes Leigha Menesse DO   omeprazole (PRILOSEC) 20 MG delayed release capsule Take 1 capsule by mouth daily 4/22/24 10/19/24 Yes Leigha Meneses DO   methocarbamol (ROBAXIN) 750 MG tablet Take 1 tablet by mouth 3 times daily as needed (muuscle spasms) 4/22/24 7/21/24 Yes Leigha Meneses DO   sertraline (ZOLOFT) 100 MG tablet Take 1 tablet by mouth daily 4/22/24 10/19/24 Yes Leigha Meneses, DO     Social History     Socioeconomic History    Marital status:      Spouse name: Not on file    Number of children: Not on file    Years of education: Not on file    Highest education level: Not on file   Occupational History    Not on file   Tobacco Use    Smoking status: Some Days     Types: Cigars     Start date: 2019    Smokeless tobacco: Never   Vaping Use    Vaping Use: Never used   Substance and Sexual Activity    Alcohol use: Not Currently    Drug use: Yes     Types: Marijuana (Weed)     Comment: edibles for seizures    Sexual activity: Not Currently     Partners: Female   Other Topics Concern    Not on file   Social History Narrative    Not on file     Social Determinants of Health     Financial Resource Strain: Low Risk  (3/15/2024)    Overall Financial Resource Strain (CARDIA)     Difficulty of Paying Living Expenses: Not hard at all   Food Insecurity: No Food Insecurity (3/15/2024)    Hunger Vital Sign     Worried About Running Out of Food in the Last Year: Never true     Ran Out of Food in the Last Year: Never true   Transportation Needs: Unknown (3/15/2024)    PRAPARE - Transportation     Lack of Transportation (Medical): Not on file     Lack of Transportation (Non-Medical): No   Physical Activity: Insufficiently Active (4/19/2024)    Exercise Vital Sign     Days of Exercise per Week: 1 day     Minutes of Exercise per Session: 20 min   Stress: Not on file   Social Connections: Not on file   Intimate Partner

## 2024-06-04 NOTE — TELEPHONE ENCOUNTER
Patient experiencing issues with eczema, pain in hands and feet, blisters on hands and feet, going up ankles. Requests an appointment today, 6/4/24.

## 2024-06-06 DIAGNOSIS — L51.9 ERYTHEMA MULTIFORME: ICD-10-CM

## 2024-06-06 LAB
ALBUMIN SERPL-MCNC: 4.1 G/DL (ref 3.4–5)
ALBUMIN/GLOB SERPL: 1.4 {RATIO} (ref 1.1–2.2)
ALP SERPL-CCNC: 115 U/L (ref 40–129)
ALT SERPL-CCNC: 13 U/L (ref 10–40)
ANION GAP SERPL CALCULATED.3IONS-SCNC: 9 MMOL/L (ref 3–16)
AST SERPL-CCNC: 12 U/L (ref 15–37)
BACTERIA URNS QL MICRO: ABNORMAL /HPF
BASOPHILS # BLD: 0 K/UL (ref 0–0.2)
BASOPHILS NFR BLD: 0 %
BILIRUB SERPL-MCNC: <0.2 MG/DL (ref 0–1)
BILIRUB UR QL STRIP.AUTO: ABNORMAL
BUN SERPL-MCNC: 10 MG/DL (ref 7–20)
C3 SERPL-MCNC: 133.6 MG/DL (ref 90–180)
C4 SERPL-MCNC: 33.6 MG/DL (ref 10–40)
CALCIUM SERPL-MCNC: 9.4 MG/DL (ref 8.3–10.6)
CHLORIDE SERPL-SCNC: 104 MMOL/L (ref 99–110)
CLARITY UR: ABNORMAL
CO2 SERPL-SCNC: 26 MMOL/L (ref 21–32)
COLOR UR: ABNORMAL
CREAT SERPL-MCNC: 0.6 MG/DL (ref 0.6–1.1)
CREAT UR-MCNC: 348.5 MG/DL (ref 28–259)
CRP SERPL-MCNC: 4.7 MG/L (ref 0–5.1)
DEPRECATED RDW RBC AUTO: 16.4 % (ref 12.4–15.4)
EOSINOPHIL # BLD: 0.1 K/UL (ref 0–0.6)
EOSINOPHIL NFR BLD: 2 %
EPI CELLS #/AREA URNS AUTO: 4 /HPF (ref 0–5)
ERYTHROCYTE [SEDIMENTATION RATE] IN BLOOD BY WESTERGREN METHOD: 13 MM/HR (ref 0–20)
GFR SERPLBLD CREATININE-BSD FMLA CKD-EPI: >90 ML/MIN/{1.73_M2}
GLUCOSE SERPL-MCNC: 87 MG/DL (ref 70–99)
GLUCOSE UR STRIP.AUTO-MCNC: NEGATIVE MG/DL
HCT VFR BLD AUTO: 40 % (ref 36–48)
HGB BLD-MCNC: 13.5 G/DL (ref 12–16)
HGB UR QL STRIP.AUTO: NEGATIVE
HYALINE CASTS #/AREA URNS AUTO: 0 /LPF (ref 0–8)
KETONES UR STRIP.AUTO-MCNC: ABNORMAL MG/DL
LEUKOCYTE ESTERASE UR QL STRIP.AUTO: NEGATIVE
LYMPHOCYTES # BLD: 2.8 K/UL (ref 1–5.1)
LYMPHOCYTES NFR BLD: 45 %
MCHC RBC AUTO-ENTMCNC: 33.7 G/DL (ref 31–36)
MCV RBC AUTO: 81.7 FL (ref 80–100)
MONOCYTES # BLD: 0.6 K/UL (ref 0–1.3)
MONOCYTES NFR BLD: 10 %
NEUTROPHILS # BLD: 2.7 K/UL (ref 1.7–7.7)
NEUTROPHILS NFR BLD: 43 %
NEUTS VAC BLD QL SMEAR: PRESENT
NITRITE UR QL STRIP.AUTO: NEGATIVE
PH UR STRIP.AUTO: 6 [PH] (ref 5–8)
PLATELET # BLD AUTO: 281 K/UL (ref 135–450)
PMV BLD AUTO: 8.6 FL (ref 5–10.5)
POTASSIUM SERPL-SCNC: 4.7 MMOL/L (ref 3.5–5.1)
PROT SERPL-MCNC: 7 G/DL (ref 6.4–8.2)
PROT UR STRIP.AUTO-MCNC: 30 MG/DL
PROT UR-MCNC: 0.03 G/DL
PROT UR-MCNC: 24 MG/DL
PROT UR-MCNC: 27 MG/DL
PROT/CREAT UR-RTO: 0.1 MG/DL
RBC # BLD AUTO: 4.9 M/UL (ref 4–5.2)
RBC CLUMPS #/AREA URNS AUTO: 3 /HPF (ref 0–4)
RBC MORPH BLD: NORMAL
RHEUMATOID FACT SER IA-ACNC: <10 IU/ML
SODIUM SERPL-SCNC: 139 MMOL/L (ref 136–145)
SP GR UR STRIP.AUTO: 1.04 (ref 1–1.03)
UA DIPSTICK W REFLEX MICRO PNL UR: YES
URN SPEC COLLECT METH UR: ABNORMAL
UROBILINOGEN UR STRIP-ACNC: 1 E.U./DL
WBC # BLD AUTO: 6.2 K/UL (ref 4–11)
WBC #/AREA URNS AUTO: 1 /HPF (ref 0–5)

## 2024-06-07 LAB
CCP IGG SERPL-ACNC: <0.5 U/ML (ref 0–2.9)
DSDNA AB SER-ACNC: 1 IU/ML (ref 0–9)
ENA JO1 AB SER IA-ACNC: <0.2 AI (ref 0–0.9)
ENA SCL70 IGG SER IA-ACNC: <0.2 AI (ref 0–0.9)
ENA SM AB SER IA-ACNC: <0.2 AI (ref 0–0.9)
ENA SS-A AB SER IA-ACNC: <0.2 AI (ref 0–0.9)
ENA SS-B AB SER IA-ACNC: <0.2 AI (ref 0–0.9)
PROT PATTERN UR ELPH-IMP: NORMAL

## 2024-06-08 LAB
CARDIOLIPIN IGG SER IA-ACNC: <10 GPL
CARDIOLIPIN IGM SER IA-ACNC: 34 MPL
EBV EA-D IGG SER-ACNC: <5 U/ML (ref 0–10.9)
EBV NA IGG SER IA-ACNC: >600 U/ML (ref 0–21.9)
EBV VCA IGG SER IA-ACNC: 740 U/ML (ref 0–21.9)
EBV VCA IGM SER IA-ACNC: <10 U/ML (ref 0–43.9)

## 2024-06-09 LAB
HSV1 GG IGG SER-ACNC: 5.49 IV
HSV1+2 IGG SER IA-ACNC: 18 IV
HSV1+2 IGM SER IA-ACNC: 0.72 IV
HSV2 GG IGG SER-ACNC: 0.64 IV

## 2024-06-10 LAB — H CAPSUL AB TITR SER ID: NOT DETECTED {TITER}

## 2024-07-19 ENCOUNTER — NURSE ONLY (OUTPATIENT)
Dept: INTERNAL MEDICINE CLINIC | Age: 39
End: 2024-07-19

## 2024-07-19 DIAGNOSIS — J32.9 BACTERIAL SINUSITIS: Primary | ICD-10-CM

## 2024-07-19 DIAGNOSIS — B96.89 BACTERIAL SINUSITIS: Primary | ICD-10-CM

## 2024-07-19 RX ORDER — PREDNISONE 20 MG/1
40 TABLET ORAL DAILY
Qty: 20 TABLET | Refills: 0 | Status: SHIPPED | OUTPATIENT
Start: 2024-07-19 | End: 2024-07-29

## 2024-07-19 RX ORDER — DOXYCYCLINE HYCLATE 100 MG
100 TABLET ORAL 2 TIMES DAILY
Qty: 14 TABLET | Refills: 0 | Status: SHIPPED | OUTPATIENT
Start: 2024-07-19 | End: 2024-07-26

## 2024-07-19 NOTE — PROGRESS NOTES
Pt arrived to office with both eyes visibly swollen. Pt reports recent onset L>R swelling, severe tenderness to palpation, completely unable to open R eye d/t swelling, L eye only opens slightly d/t swelling. Pt able to move eyes side to side with only minimal discomfort (reports mild burning, like dry eyes). Denies eyeball pain/pressure.     Upon evaluation, pt's eyes are not red, not enlarged, PERRLA. Pt reports recent hx of \"probable\" R ear infection - ear drum visible, white, unremarkable. Pts upper lids swollen, most tender near inner canthus. Brow line most tender at center. Reports mild swelling there, also. Endorses nasal drainage and mildly sore throat - coincides with mildly red strip down the back of her throat. Nares unremarkable, tonsils unremarkable.     PCP Meneses informed. She verbalized s/s for pt to watch for and report to ED. Stated abx and steroid will be sent to pharmacy.    Patient notified. Agreeable to plan.

## 2024-07-22 ENCOUNTER — OFFICE VISIT (OUTPATIENT)
Dept: INTERNAL MEDICINE CLINIC | Age: 39
End: 2024-07-22
Payer: MEDICARE

## 2024-07-22 VITALS
SYSTOLIC BLOOD PRESSURE: 128 MMHG | HEART RATE: 88 BPM | OXYGEN SATURATION: 98 % | WEIGHT: 293 LBS | BODY MASS INDEX: 47.45 KG/M2 | DIASTOLIC BLOOD PRESSURE: 80 MMHG

## 2024-07-22 DIAGNOSIS — M35.9 AUTOIMMUNE DISEASE (HCC): Primary | ICD-10-CM

## 2024-07-22 PROCEDURE — 99214 OFFICE O/P EST MOD 30 MIN: CPT | Performed by: STUDENT IN AN ORGANIZED HEALTH CARE EDUCATION/TRAINING PROGRAM

## 2024-07-22 RX ORDER — PREDNISONE 50 MG/1
50 TABLET ORAL DAILY
Qty: 5 TABLET | Refills: 0 | Status: SHIPPED | OUTPATIENT
Start: 2024-07-22 | End: 2024-07-27

## 2024-07-22 NOTE — PROGRESS NOTES
Cely Ochoa (:  1985) is a 38 y.o. female,Established patient, here for evaluation of the following chief complaint(s):  No chief complaint on file.        Assessment/Plan:     1. Autoimmune disease (HCC)  - Pt really needs to see rheum  - pt to send list of rheum providers in network; encourage her to share list over [facility] to receive our assistance in getting her scheduled soon  - External Referral To Rheumatology  - predniSONE (DELTASONE) 50 MG tablet; Take 1 tablet by mouth daily for 5 days  Dispense: 5 tablet; Refill: 0        Return in about 6 months (around 2025) for f/u.         PHQ-9 Total Score: 8 (2024  5:00 PM)  Thoughts that you would be better off dead, or of hurting yourself in some way: 0 (2024  5:00 PM)     Subjective   SUBJECTIVE/OBJECTIVE:  HPI:   Patient presents for persistent periorbital swelling and blisters on hands  Onset 1 week ago  Eye swelling worse in morning with bilateral eye discharge; sometimes green in color   Benadryl with improvement sometimes  +vomiting past 3 days   Denies fever, loss of vision, cough, SOB, trouble breathing   Was prescribed prednisone and doxycycline 3 days ago with some improvement   Patient previously on prednisone skin blisters with improvement, but blisters reappeared after stopping steroids.  Blisters with drainage   Patient has been trying to get scheduled with rheum with no success  State that she was told the offices would call her back  Autoimmune panel unremarkable  Recent stress; MIL who lived with her; passed  after being admitted for organ transplant.       Patient Active Problem List   Diagnosis    Left foot drop    Pain in both feet    Pulmonary nodules    History of hemorrhagic cerebrovascular accident (CVA) with residual deficit    Current smoker    Moderate episode of recurrent major depressive disorder (HCC)    EVI (generalized anxiety disorder)    Allergy-induced asthma, mild intermittent, uncomplicated

## 2024-07-24 ENCOUNTER — TELEPHONE (OUTPATIENT)
Dept: INTERNAL MEDICINE CLINIC | Age: 39
End: 2024-07-24

## 2024-07-24 DIAGNOSIS — B37.9 ANTIBIOTIC-INDUCED YEAST INFECTION: Primary | ICD-10-CM

## 2024-07-24 DIAGNOSIS — T36.95XA ANTIBIOTIC-INDUCED YEAST INFECTION: Primary | ICD-10-CM

## 2024-07-24 RX ORDER — FLUCONAZOLE 150 MG/1
150 TABLET ORAL ONCE
Qty: 1 TABLET | Refills: 0 | Status: SHIPPED | OUTPATIENT
Start: 2024-07-24 | End: 2024-07-24

## 2024-07-24 NOTE — TELEPHONE ENCOUNTER
Pt was put on antibiotic and now has a yeast infection.  Can Dr Meneses call in a script for that?    Please send to [facility]    Thank you

## 2024-07-25 NOTE — TELEPHONE ENCOUNTER
Yes, she sent a picture but I will list them below:  Dr. Giuseppe Calderon or Dr. Delio Reddy [phone]  Dr. Kayy Weber [phone]  Referral pended

## 2024-08-19 DIAGNOSIS — G89.29 OTHER CHRONIC PAIN: ICD-10-CM

## 2024-08-19 RX ORDER — GABAPENTIN 600 MG/1
TABLET ORAL
Qty: 90 TABLET | Refills: 1 | Status: SHIPPED | OUTPATIENT
Start: 2024-08-19 | End: 2024-11-17

## 2024-08-21 DIAGNOSIS — F33.1 MODERATE EPISODE OF RECURRENT MAJOR DEPRESSIVE DISORDER (HCC): ICD-10-CM

## 2024-08-21 DIAGNOSIS — F41.1 GAD (GENERALIZED ANXIETY DISORDER): ICD-10-CM

## 2024-08-22 RX ORDER — SERTRALINE HYDROCHLORIDE 100 MG/1
100 TABLET, FILM COATED ORAL DAILY
Qty: 90 TABLET | Refills: 1 | Status: SHIPPED | OUTPATIENT
Start: 2024-08-22 | End: 2025-02-18

## 2024-08-28 ENCOUNTER — TELEPHONE (OUTPATIENT)
Dept: INTERNAL MEDICINE CLINIC | Age: 39
End: 2024-08-28

## 2024-08-28 DIAGNOSIS — M35.9 AUTOIMMUNE DISEASE (HCC): Primary | ICD-10-CM

## 2024-08-28 NOTE — TELEPHONE ENCOUNTER
Pt calling to let Dr Meneses that the blisters are back.    Pt is wanting to know if she can have the script she was prescribed before for this.    Please send to [facility]    Thank you

## 2024-08-29 RX ORDER — PREDNISONE 50 MG/1
50 TABLET ORAL DAILY
Qty: 10 TABLET | Refills: 0 | Status: SHIPPED | OUTPATIENT
Start: 2024-08-29 | End: 2024-09-08

## 2024-09-10 ENCOUNTER — TELEPHONE (OUTPATIENT)
Dept: INTERNAL MEDICINE CLINIC | Age: 39
End: 2024-09-10

## 2024-11-04 DIAGNOSIS — F41.1 GAD (GENERALIZED ANXIETY DISORDER): ICD-10-CM

## 2024-11-04 DIAGNOSIS — F33.1 MODERATE EPISODE OF RECURRENT MAJOR DEPRESSIVE DISORDER (HCC): ICD-10-CM

## 2024-11-04 DIAGNOSIS — G89.29 OTHER CHRONIC PAIN: ICD-10-CM

## 2024-11-04 NOTE — TELEPHONE ENCOUNTER
Patient came on requesting a refill of her medications:    sertraline (ZOLOFT) 100 MG tablet (last filled: 08/22/2024)  gabapentin (NEURONTIN) 600 MG tablet (last filled: 08/19/2024)  traZODone (DESYREL) 50 MG tablet (last filled: 04/22/2024)  Would like to ask if Dr. Meneses could fill her B-12    Would also like to request a cream for her eczema that is beginning to spread from her hands to her arms.    Please send refills to the [facility] on [address].      Last OV  07/22/2024  Next OV  01/23/2025

## 2024-11-05 RX ORDER — GABAPENTIN 600 MG/1
600 TABLET ORAL 3 TIMES DAILY
Qty: 90 TABLET | Refills: 2 | Status: SHIPPED | OUTPATIENT
Start: 2024-11-05 | End: 2025-02-03

## 2024-11-05 RX ORDER — TRAZODONE HYDROCHLORIDE 50 MG/1
50 TABLET, FILM COATED ORAL NIGHTLY
Qty: 90 TABLET | Refills: 1 | Status: SHIPPED | OUTPATIENT
Start: 2024-11-05 | End: 2025-05-04

## 2024-11-05 RX ORDER — SERTRALINE HYDROCHLORIDE 100 MG/1
100 TABLET, FILM COATED ORAL DAILY
Qty: 90 TABLET | Refills: 1 | Status: SHIPPED | OUTPATIENT
Start: 2024-11-05 | End: 2025-05-04

## 2024-12-21 DIAGNOSIS — G40.909 SEIZURE DISORDER (HCC): ICD-10-CM

## 2024-12-23 RX ORDER — LEVETIRACETAM 250 MG/1
TABLET ORAL
Qty: 720 TABLET | Refills: 1 | Status: SHIPPED | OUTPATIENT
Start: 2024-12-23

## 2024-12-26 ENCOUNTER — TELEPHONE (OUTPATIENT)
Dept: INTERNAL MEDICINE CLINIC | Age: 39
End: 2024-12-26

## 2024-12-26 NOTE — TELEPHONE ENCOUNTER
Patient reports being lethargic and BP readings around 152/102.  She wanted a soon appointment with Dr Meneses but there was none available.  She has been scheduled with MADISON Armendariz on 12/30.    Patient wanted to let Dr Meneses know.

## 2024-12-27 ENCOUNTER — OFFICE VISIT (OUTPATIENT)
Dept: INTERNAL MEDICINE CLINIC | Age: 39
End: 2024-12-27
Payer: COMMERCIAL

## 2024-12-27 VITALS
WEIGHT: 293 LBS | BODY MASS INDEX: 48.1 KG/M2 | SYSTOLIC BLOOD PRESSURE: 118 MMHG | DIASTOLIC BLOOD PRESSURE: 84 MMHG | HEART RATE: 118 BPM | OXYGEN SATURATION: 96 %

## 2024-12-27 DIAGNOSIS — R53.82 CHRONIC FATIGUE, UNSPECIFIED: ICD-10-CM

## 2024-12-27 DIAGNOSIS — R63.1 POLYDIPSIA: ICD-10-CM

## 2024-12-27 DIAGNOSIS — B34.9 VIRAL SYNDROME: Primary | ICD-10-CM

## 2024-12-27 DIAGNOSIS — R03.0 ELEVATED BLOOD PRESSURE READING: ICD-10-CM

## 2024-12-27 DIAGNOSIS — B34.9 VIRAL SYNDROME: ICD-10-CM

## 2024-12-27 LAB
ALBUMIN SERPL-MCNC: 4.6 G/DL (ref 3.4–5)
ALBUMIN/GLOB SERPL: 1.6 {RATIO} (ref 1.1–2.2)
ALP SERPL-CCNC: 105 U/L (ref 40–129)
ALT SERPL-CCNC: 15 U/L (ref 10–40)
ANION GAP SERPL CALCULATED.3IONS-SCNC: 11 MMOL/L (ref 3–16)
AST SERPL-CCNC: 15 U/L (ref 15–37)
BASOPHILS # BLD: 0 K/UL (ref 0–0.2)
BASOPHILS NFR BLD: 0.6 %
BILIRUB SERPL-MCNC: <0.2 MG/DL (ref 0–1)
BUN SERPL-MCNC: 13 MG/DL (ref 7–20)
CALCIUM SERPL-MCNC: 10.3 MG/DL (ref 8.3–10.6)
CHLORIDE SERPL-SCNC: 101 MMOL/L (ref 99–110)
CO2 SERPL-SCNC: 24 MMOL/L (ref 21–32)
CREAT SERPL-MCNC: 0.8 MG/DL (ref 0.6–1.1)
DEPRECATED RDW RBC AUTO: 15.6 % (ref 12.4–15.4)
EOSINOPHIL # BLD: 0.1 K/UL (ref 0–0.6)
EOSINOPHIL NFR BLD: 1.7 %
GFR SERPLBLD CREATININE-BSD FMLA CKD-EPI: >90 ML/MIN/{1.73_M2}
GLUCOSE SERPL-MCNC: 89 MG/DL (ref 70–99)
HCT VFR BLD AUTO: 45.3 % (ref 36–48)
HGB BLD-MCNC: 14.4 G/DL (ref 12–16)
LYMPHOCYTES # BLD: 2.8 K/UL (ref 1–5.1)
LYMPHOCYTES NFR BLD: 44.2 %
MCH RBC QN AUTO: 26.9 PG (ref 26–34)
MCHC RBC AUTO-ENTMCNC: 31.8 G/DL (ref 31–36)
MCV RBC AUTO: 84.6 FL (ref 80–100)
MONOCYTES # BLD: 0.9 K/UL (ref 0–1.3)
MONOCYTES NFR BLD: 14.4 %
NEUTROPHILS # BLD: 2.5 K/UL (ref 1.7–7.7)
NEUTROPHILS NFR BLD: 39.1 %
PLATELET # BLD AUTO: 325 K/UL (ref 135–450)
PMV BLD AUTO: 8.2 FL (ref 5–10.5)
POTASSIUM SERPL-SCNC: 5.1 MMOL/L (ref 3.5–5.1)
PROT SERPL-MCNC: 7.5 G/DL (ref 6.4–8.2)
RBC # BLD AUTO: 5.36 M/UL (ref 4–5.2)
SODIUM SERPL-SCNC: 136 MMOL/L (ref 136–145)
T3FREE SERPL-MCNC: 3.1 PG/ML (ref 2.3–4.2)
TSH SERPL DL<=0.005 MIU/L-ACNC: 1.13 UIU/ML (ref 0.27–4.2)
WBC # BLD AUTO: 6.3 K/UL (ref 4–11)

## 2024-12-27 PROCEDURE — 99214 OFFICE O/P EST MOD 30 MIN: CPT | Performed by: STUDENT IN AN ORGANIZED HEALTH CARE EDUCATION/TRAINING PROGRAM

## 2024-12-27 PROCEDURE — 4004F PT TOBACCO SCREEN RCVD TLK: CPT | Performed by: STUDENT IN AN ORGANIZED HEALTH CARE EDUCATION/TRAINING PROGRAM

## 2024-12-27 PROCEDURE — G8417 CALC BMI ABV UP PARAM F/U: HCPCS | Performed by: STUDENT IN AN ORGANIZED HEALTH CARE EDUCATION/TRAINING PROGRAM

## 2024-12-27 PROCEDURE — G8427 DOCREV CUR MEDS BY ELIG CLIN: HCPCS | Performed by: STUDENT IN AN ORGANIZED HEALTH CARE EDUCATION/TRAINING PROGRAM

## 2024-12-27 PROCEDURE — G8484 FLU IMMUNIZE NO ADMIN: HCPCS | Performed by: STUDENT IN AN ORGANIZED HEALTH CARE EDUCATION/TRAINING PROGRAM

## 2024-12-27 RX ORDER — CETIRIZINE HYDROCHLORIDE 5 MG/1
5 TABLET ORAL DAILY
COMMUNITY

## 2024-12-27 RX ORDER — DIPHENHYDRAMINE HCL 25 MG
25 CAPSULE ORAL EVERY 6 HOURS PRN
COMMUNITY

## 2024-12-27 ASSESSMENT — PATIENT HEALTH QUESTIONNAIRE - PHQ9
1. LITTLE INTEREST OR PLEASURE IN DOING THINGS: SEVERAL DAYS
SUM OF ALL RESPONSES TO PHQ QUESTIONS 1-9: 11
2. FEELING DOWN, DEPRESSED OR HOPELESS: MORE THAN HALF THE DAYS
7. TROUBLE CONCENTRATING ON THINGS, SUCH AS READING THE NEWSPAPER OR WATCHING TELEVISION: MORE THAN HALF THE DAYS
5. POOR APPETITE OR OVEREATING: MORE THAN HALF THE DAYS
SUM OF ALL RESPONSES TO PHQ QUESTIONS 1-9: 11
4. FEELING TIRED OR HAVING LITTLE ENERGY: SEVERAL DAYS
10. IF YOU CHECKED OFF ANY PROBLEMS, HOW DIFFICULT HAVE THESE PROBLEMS MADE IT FOR YOU TO DO YOUR WORK, TAKE CARE OF THINGS AT HOME, OR GET ALONG WITH OTHER PEOPLE: SOMEWHAT DIFFICULT
6. FEELING BAD ABOUT YOURSELF - OR THAT YOU ARE A FAILURE OR HAVE LET YOURSELF OR YOUR FAMILY DOWN: NOT AT ALL
3. TROUBLE FALLING OR STAYING ASLEEP: NEARLY EVERY DAY
9. THOUGHTS THAT YOU WOULD BE BETTER OFF DEAD, OR OF HURTING YOURSELF: NOT AT ALL
SUM OF ALL RESPONSES TO PHQ9 QUESTIONS 1 & 2: 3
8. MOVING OR SPEAKING SO SLOWLY THAT OTHER PEOPLE COULD HAVE NOTICED. OR THE OPPOSITE, BEING SO FIGETY OR RESTLESS THAT YOU HAVE BEEN MOVING AROUND A LOT MORE THAN USUAL: NOT AT ALL
SUM OF ALL RESPONSES TO PHQ QUESTIONS 1-9: 11
SUM OF ALL RESPONSES TO PHQ QUESTIONS 1-9: 11

## 2024-12-27 ASSESSMENT — ANXIETY QUESTIONNAIRES
6. BECOMING EASILY ANNOYED OR IRRITABLE: MORE THAN HALF THE DAYS
7. FEELING AFRAID AS IF SOMETHING AWFUL MIGHT HAPPEN: NEARLY EVERY DAY
1. FEELING NERVOUS, ANXIOUS, OR ON EDGE: NEARLY EVERY DAY
2. NOT BEING ABLE TO STOP OR CONTROL WORRYING: NEARLY EVERY DAY
3. WORRYING TOO MUCH ABOUT DIFFERENT THINGS: NEARLY EVERY DAY
IF YOU CHECKED OFF ANY PROBLEMS ON THIS QUESTIONNAIRE, HOW DIFFICULT HAVE THESE PROBLEMS MADE IT FOR YOU TO DO YOUR WORK, TAKE CARE OF THINGS AT HOME, OR GET ALONG WITH OTHER PEOPLE: VERY DIFFICULT
GAD7 TOTAL SCORE: 19
4. TROUBLE RELAXING: NEARLY EVERY DAY
5. BEING SO RESTLESS THAT IT IS HARD TO SIT STILL: MORE THAN HALF THE DAYS

## 2024-12-27 NOTE — PROGRESS NOTES
Cely Ochoa (:  1985) is a 39 y.o. female,Established patient, here for evaluation of the following chief complaint(s):  Hypertension      Assessment/Plan:     1. Viral syndrome  - CMP  - CBC with Auto Differential; Future    2. Polydipsia  - TSH pending   - CMP    3. Chronic fatigue, unspecified  - TSH, CMP    4. Elevated blood pressure reading  - Controlled. Normal pressure though not taking hctz 12.5 mg   - Continue off med  - Discussed BP parameters   - Consider resuming hctz if necessary         Return if symptoms worsen or fail to improve.           Subjective   SUBJECTIVE/OBJECTIVE:  HPI:   Concerned about elevated BP in past few days   BP past few days; decided to take BP   136/100  147/107  139/83  134/100  148/102  156/100  Has not been taking hctz 12.5 mg    Has been feeling very lethargic with headaches, congestion, polydipsia   Feels like nothing quenches her thirst  Drinks water all day; ~a gallon a day   Recently getting over URI. +Chronic body aches   Denies fever, chills, sore throat, cough   +intermittent sweats   Diffuse tingling feeling in body; feels like poor circulation  Scheduled with rheum, but was in hospital for 5 days for seizures   Next appt    Appt with derm in February     Patient Active Problem List   Diagnosis    Left foot drop    Pain in both feet    Pulmonary nodules    History of hemorrhagic cerebrovascular accident (CVA) with residual deficit    Current smoker    Moderate episode of recurrent major depressive disorder (HCC)    EVI (generalized anxiety disorder)    Allergy-induced asthma, mild intermittent, uncomplicated    Fluid retention in legs    Primary osteoarthritis involving multiple joints    BMI 45.0-49.9, adult    Seizure disorder (HCC)    Chronic pain of both knees    Weakness of left upper extremity     Past Medical History:   Diagnosis Date    Anxiety     Asthma     Cerebrovascular disease 2019    Depression 2018    Left foot drop

## 2025-01-14 DIAGNOSIS — G89.29 OTHER CHRONIC PAIN: ICD-10-CM

## 2025-01-15 RX ORDER — IBUPROFEN 800 MG/1
800 TABLET, FILM COATED ORAL 2 TIMES DAILY PRN
Qty: 120 TABLET | Refills: 1 | Status: SHIPPED | OUTPATIENT
Start: 2025-01-15 | End: 2025-05-15

## 2025-02-17 ENCOUNTER — TELEMEDICINE (OUTPATIENT)
Dept: INTERNAL MEDICINE CLINIC | Age: 40
End: 2025-02-17

## 2025-02-17 DIAGNOSIS — B97.89 VIRAL RESPIRATORY ILLNESS: Primary | ICD-10-CM

## 2025-02-17 DIAGNOSIS — J98.8 VIRAL RESPIRATORY ILLNESS: Primary | ICD-10-CM

## 2025-02-17 DIAGNOSIS — G89.29 OTHER CHRONIC PAIN: ICD-10-CM

## 2025-02-17 RX ORDER — GABAPENTIN 600 MG/1
600 TABLET ORAL 3 TIMES DAILY
Qty: 90 TABLET | Refills: 2 | Status: SHIPPED | OUTPATIENT
Start: 2025-02-17 | End: 2025-05-18

## 2025-02-17 RX ORDER — LANOLIN ALCOHOL/MO/W.PET/CERES
1000 CREAM (GRAM) TOPICAL DAILY
COMMUNITY
Start: 2024-09-28

## 2025-02-17 ASSESSMENT — PATIENT HEALTH QUESTIONNAIRE - PHQ9
6. FEELING BAD ABOUT YOURSELF - OR THAT YOU ARE A FAILURE OR HAVE LET YOURSELF OR YOUR FAMILY DOWN: NOT AT ALL
SUM OF ALL RESPONSES TO PHQ QUESTIONS 1-9: 6
9. THOUGHTS THAT YOU WOULD BE BETTER OFF DEAD, OR OF HURTING YOURSELF: NOT AT ALL
SUM OF ALL RESPONSES TO PHQ QUESTIONS 1-9: 6
4. FEELING TIRED OR HAVING LITTLE ENERGY: MORE THAN HALF THE DAYS
3. TROUBLE FALLING OR STAYING ASLEEP: MORE THAN HALF THE DAYS
2. FEELING DOWN, DEPRESSED OR HOPELESS: SEVERAL DAYS
5. POOR APPETITE OR OVEREATING: SEVERAL DAYS
SUM OF ALL RESPONSES TO PHQ9 QUESTIONS 1 & 2: 1
1. LITTLE INTEREST OR PLEASURE IN DOING THINGS: NOT AT ALL
7. TROUBLE CONCENTRATING ON THINGS, SUCH AS READING THE NEWSPAPER OR WATCHING TELEVISION: NOT AT ALL
10. IF YOU CHECKED OFF ANY PROBLEMS, HOW DIFFICULT HAVE THESE PROBLEMS MADE IT FOR YOU TO DO YOUR WORK, TAKE CARE OF THINGS AT HOME, OR GET ALONG WITH OTHER PEOPLE: NOT DIFFICULT AT ALL
8. MOVING OR SPEAKING SO SLOWLY THAT OTHER PEOPLE COULD HAVE NOTICED. OR THE OPPOSITE, BEING SO FIGETY OR RESTLESS THAT YOU HAVE BEEN MOVING AROUND A LOT MORE THAN USUAL: NOT AT ALL

## 2025-02-17 NOTE — PROGRESS NOTES
Cely Ochoa, was evaluated through a synchronous (real-time) audio-video encounter. The patient (or guardian if applicable) is aware that this is a billable service, which includes applicable co-pays. This Virtual Visit was conducted with patient's (and/or legal guardian's) consent. Patient identification was verified, and a caregiver was present when appropriate.   The patient was located at Home: [address] [place] [address]  Provider was located at Facility (Appt Dept): [address] [place] [address]  Confirm you are appropriately licensed, registered, or certified to deliver care in the state where the patient is located as indicated above. If you are not or unsure, please re-schedule the visit: Yes, I confirm.     Cely Ochoa (:  1985) is a Established patient, presenting virtually for evaluation of the following:      Below is the assessment and plan developed based on review of pertinent history, physical exam, labs, studies, and medications.     Assessment & Plan  Viral respiratory illness    - Out of tx window for influenza  -Discussed supportive treatment/care  -Tylenol as needed for pain  -Adequate hydration  -Return precautions given       Other chronic pain   - Refill    Orders:    gabapentin (NEURONTIN) 600 MG tablet; Take 1 tablet by mouth 3 times daily for 90 days.      Return if symptoms worsen or fail to improve.       Subjective   HPI  Congestion, body aches, retro orbital pressure, non productive cough, clear rhinorrhea, fatigue   Onset 6 days ago with 1 day break of symptoms  Symptoms resumed 4 days ago  ED Friday for seizures   Diagnosed with influenza but was out of treatment window  Has tried dayquil, nyquil, mucinex, tylenol (500 mg) and ibuprofen (800)    Denies fever, SOB, chest pain  Overall symptoms worsening         Objective   Patient-Reported Vitals  No data recorded     Physical Exam  [INSTRUCTIONS:  \"[x]\" Indicates a positive item  \"[]\" Indicates a

## 2025-05-05 ENCOUNTER — HOSPITAL ENCOUNTER (INPATIENT)
Age: 40
LOS: 8 days | Discharge: NURSING, REHAB OR LONG-TERM CARE | DRG: 101 | End: 2025-05-13
Attending: STUDENT IN AN ORGANIZED HEALTH CARE EDUCATION/TRAINING PROGRAM | Admitting: INTERNAL MEDICINE
Payer: MEDICARE

## 2025-05-05 ENCOUNTER — APPOINTMENT (OUTPATIENT)
Dept: CT IMAGING | Age: 40
DRG: 101 | End: 2025-05-05
Payer: MEDICARE

## 2025-05-05 DIAGNOSIS — G40.909 SEIZURE DISORDER (HCC): ICD-10-CM

## 2025-05-05 DIAGNOSIS — R29.810 FACIAL DROOP: ICD-10-CM

## 2025-05-05 DIAGNOSIS — R53.1 ACUTE LEFT-SIDED WEAKNESS: ICD-10-CM

## 2025-05-05 DIAGNOSIS — J45.20 ALLERGY-INDUCED ASTHMA, MILD INTERMITTENT, UNCOMPLICATED: ICD-10-CM

## 2025-05-05 DIAGNOSIS — R56.9 SEIZURE-LIKE ACTIVITY (HCC): Primary | ICD-10-CM

## 2025-05-05 LAB
ALBUMIN SERPL-MCNC: 4 G/DL (ref 3.4–5)
ALBUMIN/GLOB SERPL: 1.3 {RATIO} (ref 1.1–2.2)
ALP SERPL-CCNC: 95 U/L (ref 40–129)
ALT SERPL-CCNC: 11 U/L (ref 10–40)
ANION GAP SERPL CALCULATED.3IONS-SCNC: 13 MMOL/L (ref 3–16)
AST SERPL-CCNC: 17 U/L (ref 15–37)
B-HCG SERPL EIA 3RD IS-ACNC: <5 MIU/ML
BASOPHILS # BLD: 0 K/UL (ref 0–0.2)
BASOPHILS NFR BLD: 0.6 %
BILIRUB SERPL-MCNC: <0.2 MG/DL (ref 0–1)
BUN SERPL-MCNC: 8 MG/DL (ref 7–20)
CALCIUM SERPL-MCNC: 9.2 MG/DL (ref 8.3–10.6)
CHLORIDE SERPL-SCNC: 107 MMOL/L (ref 99–110)
CO2 SERPL-SCNC: 20 MMOL/L (ref 21–32)
CREAT SERPL-MCNC: 0.8 MG/DL (ref 0.6–1.1)
DEPRECATED RDW RBC AUTO: 16.3 % (ref 12.4–15.4)
EOSINOPHIL # BLD: 0 K/UL (ref 0–0.6)
EOSINOPHIL NFR BLD: 0.3 %
GFR SERPLBLD CREATININE-BSD FMLA CKD-EPI: >90 ML/MIN/{1.73_M2}
GLUCOSE BLD-MCNC: 110 MG/DL (ref 70–99)
GLUCOSE SERPL-MCNC: 91 MG/DL (ref 70–99)
HCT VFR BLD AUTO: 39.8 % (ref 36–48)
HGB BLD-MCNC: 12.9 G/DL (ref 12–16)
LYMPHOCYTES # BLD: 1.2 K/UL (ref 1–5.1)
LYMPHOCYTES NFR BLD: 19.6 %
MCH RBC QN AUTO: 26.2 PG (ref 26–34)
MCHC RBC AUTO-ENTMCNC: 32.5 G/DL (ref 31–36)
MCV RBC AUTO: 80.6 FL (ref 80–100)
MONOCYTES # BLD: 0.3 K/UL (ref 0–1.3)
MONOCYTES NFR BLD: 5.1 %
NEUTROPHILS # BLD: 4.5 K/UL (ref 1.7–7.7)
NEUTROPHILS NFR BLD: 74.4 %
NT-PROBNP SERPL-MCNC: <36 PG/ML (ref 0–124)
PERFORMED ON: ABNORMAL
PLATELET # BLD AUTO: 284 K/UL (ref 135–450)
PMV BLD AUTO: 7.1 FL (ref 5–10.5)
POTASSIUM SERPL-SCNC: 3.7 MMOL/L (ref 3.5–5.1)
PROT SERPL-MCNC: 7.1 G/DL (ref 6.4–8.2)
RBC # BLD AUTO: 4.94 M/UL (ref 4–5.2)
SODIUM SERPL-SCNC: 140 MMOL/L (ref 136–145)
TROPONIN, HIGH SENSITIVITY: <6 NG/L (ref 0–14)
TROPONIN, HIGH SENSITIVITY: <6 NG/L (ref 0–14)
WBC # BLD AUTO: 6.1 K/UL (ref 4–11)

## 2025-05-05 PROCEDURE — 85025 COMPLETE CBC W/AUTO DIFF WBC: CPT

## 2025-05-05 PROCEDURE — 94760 N-INVAS EAR/PLS OXIMETRY 1: CPT

## 2025-05-05 PROCEDURE — 99285 EMERGENCY DEPT VISIT HI MDM: CPT

## 2025-05-05 PROCEDURE — 83880 ASSAY OF NATRIURETIC PEPTIDE: CPT

## 2025-05-05 PROCEDURE — 84702 CHORIONIC GONADOTROPIN TEST: CPT

## 2025-05-05 PROCEDURE — 6370000000 HC RX 637 (ALT 250 FOR IP): Performed by: NURSE PRACTITIONER

## 2025-05-05 PROCEDURE — 6360000004 HC RX CONTRAST MEDICATION: Performed by: PHYSICIAN ASSISTANT

## 2025-05-05 PROCEDURE — 93005 ELECTROCARDIOGRAM TRACING: CPT | Performed by: STUDENT IN AN ORGANIZED HEALTH CARE EDUCATION/TRAINING PROGRAM

## 2025-05-05 PROCEDURE — 2060000000 HC ICU INTERMEDIATE R&B

## 2025-05-05 PROCEDURE — 71260 CT THORAX DX C+: CPT

## 2025-05-05 PROCEDURE — 6360000002 HC RX W HCPCS: Performed by: STUDENT IN AN ORGANIZED HEALTH CARE EDUCATION/TRAINING PROGRAM

## 2025-05-05 PROCEDURE — 6360000002 HC RX W HCPCS: Performed by: PHYSICIAN ASSISTANT

## 2025-05-05 PROCEDURE — 6370000000 HC RX 637 (ALT 250 FOR IP): Performed by: STUDENT IN AN ORGANIZED HEALTH CARE EDUCATION/TRAINING PROGRAM

## 2025-05-05 PROCEDURE — 70450 CT HEAD/BRAIN W/O DYE: CPT

## 2025-05-05 PROCEDURE — 2700000000 HC OXYGEN THERAPY PER DAY

## 2025-05-05 PROCEDURE — 2500000003 HC RX 250 WO HCPCS: Performed by: NURSE PRACTITIONER

## 2025-05-05 PROCEDURE — 80053 COMPREHEN METABOLIC PANEL: CPT

## 2025-05-05 PROCEDURE — 2580000003 HC RX 258: Performed by: PHYSICIAN ASSISTANT

## 2025-05-05 PROCEDURE — 94640 AIRWAY INHALATION TREATMENT: CPT

## 2025-05-05 PROCEDURE — 84484 ASSAY OF TROPONIN QUANT: CPT

## 2025-05-05 PROCEDURE — 96374 THER/PROPH/DIAG INJ IV PUSH: CPT

## 2025-05-05 PROCEDURE — 70496 CT ANGIOGRAPHY HEAD: CPT

## 2025-05-05 RX ORDER — PREDNISONE 20 MG/1
40 TABLET ORAL DAILY
Status: DISCONTINUED | OUTPATIENT
Start: 2025-05-05 | End: 2025-05-07

## 2025-05-05 RX ORDER — LABETALOL HYDROCHLORIDE 5 MG/ML
10 INJECTION, SOLUTION INTRAVENOUS EVERY 10 MIN PRN
Status: DISCONTINUED | OUTPATIENT
Start: 2025-05-05 | End: 2025-05-13 | Stop reason: HOSPADM

## 2025-05-05 RX ORDER — GABAPENTIN 600 MG/1
600 TABLET ORAL 3 TIMES DAILY
Status: DISCONTINUED | OUTPATIENT
Start: 2025-05-05 | End: 2025-05-05

## 2025-05-05 RX ORDER — SODIUM CHLORIDE 9 MG/ML
INJECTION, SOLUTION INTRAVENOUS PRN
Status: DISCONTINUED | OUTPATIENT
Start: 2025-05-05 | End: 2025-05-13 | Stop reason: HOSPADM

## 2025-05-05 RX ORDER — ALBUTEROL SULFATE 90 UG/1
2 INHALANT RESPIRATORY (INHALATION) 4 TIMES DAILY PRN
Status: DISCONTINUED | OUTPATIENT
Start: 2025-05-05 | End: 2025-05-13 | Stop reason: HOSPADM

## 2025-05-05 RX ORDER — CODEINE PHOSPHATE AND GUAIFENESIN 10; 100 MG/5ML; MG/5ML
5 SOLUTION ORAL EVERY 4 HOURS PRN
Status: DISCONTINUED | OUTPATIENT
Start: 2025-05-05 | End: 2025-05-05

## 2025-05-05 RX ORDER — LEVETIRACETAM 500 MG/5ML
1000 INJECTION, SOLUTION, CONCENTRATE INTRAVENOUS ONCE
Status: COMPLETED | OUTPATIENT
Start: 2025-05-05 | End: 2025-05-05

## 2025-05-05 RX ORDER — IPRATROPIUM BROMIDE AND ALBUTEROL SULFATE 2.5; .5 MG/3ML; MG/3ML
1 SOLUTION RESPIRATORY (INHALATION)
Status: DISCONTINUED | OUTPATIENT
Start: 2025-05-06 | End: 2025-05-06

## 2025-05-05 RX ORDER — SODIUM CHLORIDE 0.9 % (FLUSH) 0.9 %
5-40 SYRINGE (ML) INJECTION EVERY 12 HOURS SCHEDULED
Status: DISCONTINUED | OUTPATIENT
Start: 2025-05-05 | End: 2025-05-13 | Stop reason: HOSPADM

## 2025-05-05 RX ORDER — 0.9 % SODIUM CHLORIDE 0.9 %
1000 INTRAVENOUS SOLUTION INTRAVENOUS ONCE
Status: COMPLETED | OUTPATIENT
Start: 2025-05-05 | End: 2025-05-05

## 2025-05-05 RX ORDER — SODIUM CHLORIDE 0.9 % (FLUSH) 0.9 %
5-40 SYRINGE (ML) INJECTION PRN
Status: DISCONTINUED | OUTPATIENT
Start: 2025-05-05 | End: 2025-05-13 | Stop reason: HOSPADM

## 2025-05-05 RX ORDER — PANTOPRAZOLE SODIUM 40 MG/1
40 TABLET, DELAYED RELEASE ORAL DAILY
COMMUNITY

## 2025-05-05 RX ORDER — GABAPENTIN 300 MG/1
600 CAPSULE ORAL DAILY
Status: DISCONTINUED | OUTPATIENT
Start: 2025-05-06 | End: 2025-05-13 | Stop reason: HOSPADM

## 2025-05-05 RX ORDER — POLYETHYLENE GLYCOL 3350 17 G/17G
17 POWDER, FOR SOLUTION ORAL DAILY PRN
Status: DISCONTINUED | OUTPATIENT
Start: 2025-05-05 | End: 2025-05-07

## 2025-05-05 RX ORDER — ASPIRIN 300 MG/1
300 SUPPOSITORY RECTAL DAILY
Status: DISCONTINUED | OUTPATIENT
Start: 2025-05-06 | End: 2025-05-13 | Stop reason: HOSPADM

## 2025-05-05 RX ORDER — GABAPENTIN 300 MG/1
600 CAPSULE ORAL DAILY
Status: DISCONTINUED | OUTPATIENT
Start: 2025-05-06 | End: 2025-05-05

## 2025-05-05 RX ORDER — ENOXAPARIN SODIUM 100 MG/ML
30 INJECTION SUBCUTANEOUS 2 TIMES DAILY
Status: DISCONTINUED | OUTPATIENT
Start: 2025-05-06 | End: 2025-05-13 | Stop reason: HOSPADM

## 2025-05-05 RX ORDER — GABAPENTIN 300 MG/1
900 CAPSULE ORAL
Status: DISCONTINUED | OUTPATIENT
Start: 2025-05-06 | End: 2025-05-13 | Stop reason: HOSPADM

## 2025-05-05 RX ORDER — LEVETIRACETAM 500 MG/5ML
1000 INJECTION, SOLUTION, CONCENTRATE INTRAVENOUS EVERY 12 HOURS
Status: DISCONTINUED | OUTPATIENT
Start: 2025-05-06 | End: 2025-05-12

## 2025-05-05 RX ORDER — TRAMADOL HYDROCHLORIDE 50 MG/1
50 TABLET ORAL EVERY 6 HOURS PRN
Status: ON HOLD | COMMUNITY
End: 2025-05-12 | Stop reason: HOSPADM

## 2025-05-05 RX ORDER — ONDANSETRON 2 MG/ML
4 INJECTION INTRAMUSCULAR; INTRAVENOUS EVERY 6 HOURS PRN
Status: DISCONTINUED | OUTPATIENT
Start: 2025-05-05 | End: 2025-05-13 | Stop reason: HOSPADM

## 2025-05-05 RX ORDER — ASPIRIN 81 MG/1
81 TABLET, CHEWABLE ORAL DAILY
Status: DISCONTINUED | OUTPATIENT
Start: 2025-05-06 | End: 2025-05-13 | Stop reason: HOSPADM

## 2025-05-05 RX ORDER — ALBUTEROL SULFATE 0.83 MG/ML
2.5 SOLUTION RESPIRATORY (INHALATION) ONCE
Status: COMPLETED | OUTPATIENT
Start: 2025-05-05 | End: 2025-05-05

## 2025-05-05 RX ORDER — GABAPENTIN 400 MG/1
1200 CAPSULE ORAL NIGHTLY
Status: DISCONTINUED | OUTPATIENT
Start: 2025-05-05 | End: 2025-05-13 | Stop reason: HOSPADM

## 2025-05-05 RX ORDER — ERGOCALCIFEROL 1.25 MG/1
50000 CAPSULE ORAL WEEKLY
Status: ON HOLD | COMMUNITY
End: 2025-05-23 | Stop reason: HOSPADM

## 2025-05-05 RX ORDER — PANTOPRAZOLE SODIUM 40 MG/1
40 TABLET, DELAYED RELEASE ORAL
Status: DISCONTINUED | OUTPATIENT
Start: 2025-05-06 | End: 2025-05-13 | Stop reason: HOSPADM

## 2025-05-05 RX ORDER — GABAPENTIN 300 MG/1
900 CAPSULE ORAL
Status: DISCONTINUED | OUTPATIENT
Start: 2025-05-06 | End: 2025-05-05

## 2025-05-05 RX ORDER — ONDANSETRON 4 MG/1
4 TABLET, ORALLY DISINTEGRATING ORAL EVERY 8 HOURS PRN
Status: DISCONTINUED | OUTPATIENT
Start: 2025-05-05 | End: 2025-05-13 | Stop reason: HOSPADM

## 2025-05-05 RX ORDER — HYDROCHLOROTHIAZIDE 12.5 MG/1
12.5 TABLET ORAL DAILY
COMMUNITY

## 2025-05-05 RX ORDER — GABAPENTIN 400 MG/1
1200 CAPSULE ORAL NIGHTLY
Status: DISCONTINUED | OUTPATIENT
Start: 2025-05-06 | End: 2025-05-05

## 2025-05-05 RX ADMIN — LEVETIRACETAM 1000 MG: 100 INJECTION INTRAVENOUS at 18:39

## 2025-05-05 RX ADMIN — IOHEXOL 75 ML: 350 INJECTION, SOLUTION INTRAVENOUS at 19:02

## 2025-05-05 RX ADMIN — GABAPENTIN 1200 MG: 400 CAPSULE ORAL at 23:53

## 2025-05-05 RX ADMIN — SODIUM CHLORIDE, PRESERVATIVE FREE 10 ML: 5 INJECTION INTRAVENOUS at 23:53

## 2025-05-05 RX ADMIN — ALBUTEROL SULFATE 2.5 MG: 2.5 SOLUTION RESPIRATORY (INHALATION) at 20:58

## 2025-05-05 RX ADMIN — SODIUM CHLORIDE 1000 ML: 0.9 INJECTION, SOLUTION INTRAVENOUS at 18:37

## 2025-05-05 RX ADMIN — IOHEXOL 75 ML: 350 INJECTION, SOLUTION INTRAVENOUS at 19:01

## 2025-05-05 RX ADMIN — GUAIFENESIN AND CODEINE PHOSPHATE 5 ML: 100; 10 SOLUTION ORAL at 21:16

## 2025-05-05 RX ADMIN — PREDNISONE 40 MG: 20 TABLET ORAL at 23:53

## 2025-05-05 ASSESSMENT — PAIN DESCRIPTION - DESCRIPTORS: DESCRIPTORS: ACHING

## 2025-05-05 ASSESSMENT — PAIN SCALES - GENERAL: PAINLEVEL_OUTOF10: 5

## 2025-05-05 ASSESSMENT — PAIN DESCRIPTION - LOCATION: LOCATION: GENERALIZED

## 2025-05-05 NOTE — ED PROVIDER NOTES
I have personally performed a face to face diagnostic evaluation on this patient. I have fully participated in the care of this patient I personally saw the patient and performed a substantive portion of the visit including all aspects of the medical decision making.  I have reviewed and agree with all pertinent clinical information including history, physical exam, diagnostic tests, and the plan.    Medical Decision Making    Patient presents after having witnessed seizure-like activity by EMS.  Patient arrives postictal and does not provide history.  I obtained collateral information from patient's wife who presented to bedside shortly after she arrived to the emergency room.  She states that she called the ambulance because the patient was having shortness of breath, difficulty getting words out, left facial drooping and left-sided arm weakness that started around 5:30 PM. She had multiple seizures after this, which wife states is chronic, has known seizure disorder on Keppra and gabapentin.    On exam patient is postictal, alert and does follow some commands.  There is mild left-sided facial droop noted.  Patient moves bilateral upper extremities to command without drift. + drift left lower extremity.    Pt reassessed 8pm- now with spontaneous motor to all extremities       I called stroke alert, discussed with patient's wife that I would not offer this patient TNK as there has been multiple witnessed seizures which is a relative contraindication.  I have low suspicion for stroke especially since prior MRI from 2019 did not show any signs of acute or old stroke changes.  I did speak with  stroke team, agreed to avoid TNK at this point due to low stroke suspicion, concurrent seizure-like activity.     CTA head and neck is not showing any LVO or aneurysm.  No ICH.  CT PE study limited by motion artifact but no large central PE is present.  Will admit at this point for further stroke workup including brain MRI.

## 2025-05-05 NOTE — ED TRIAGE NOTES
Patient oriented to room and ED throughput process.  Safety measures with ED bed locked in lowest position and call light in reach.  Patient educated on all orders, including any medications.  Patient educated on chief complaint/symptoms. Patient encouraged to ask questions regarding care, medications or treatment plan.  Patient aware of how to reach staff with questions/concerns.        Patient arrives with 20 gauge PIV to RAC placed by [facility].

## 2025-05-05 NOTE — ED NOTES
Wife at bedside, called out stating patient was having a seizure. This RN and Dr. Garcia to bedside, no seizure activity was noted. Wife states she was concerned patient had a stroke at home and threw a clot because initially she was shortness of breath and then started seizing. Wife asked if patient has hx of stroke and wife stated no, then said she did have a stroke but had no deficits then said she has left foot drop.       Stroke alert called by Dr. Garcia, patient to CT by this RN and Flakito NAGY.

## 2025-05-06 ENCOUNTER — APPOINTMENT (OUTPATIENT)
Dept: MRI IMAGING | Age: 40
DRG: 101 | End: 2025-05-06
Payer: MEDICARE

## 2025-05-06 PROBLEM — R56.9 SEIZURE-LIKE ACTIVITY (HCC): Status: ACTIVE | Noted: 2025-05-06

## 2025-05-06 LAB
ANION GAP SERPL CALCULATED.3IONS-SCNC: 10 MMOL/L (ref 3–16)
BUN SERPL-MCNC: 7 MG/DL (ref 7–20)
CALCIUM SERPL-MCNC: 8.8 MG/DL (ref 8.3–10.6)
CHLORIDE SERPL-SCNC: 107 MMOL/L (ref 99–110)
CHOLEST SERPL-MCNC: 187 MG/DL (ref 0–199)
CO2 SERPL-SCNC: 19 MMOL/L (ref 21–32)
CREAT SERPL-MCNC: 0.7 MG/DL (ref 0.6–1.1)
DEPRECATED RDW RBC AUTO: 16.7 % (ref 12.4–15.4)
EKG ATRIAL RATE: 110 BPM
EKG DIAGNOSIS: NORMAL
EKG P AXIS: 62 DEGREES
EKG P-R INTERVAL: 158 MS
EKG Q-T INTERVAL: 296 MS
EKG QRS DURATION: 78 MS
EKG QTC CALCULATION (BAZETT): 400 MS
EKG R AXIS: -12 DEGREES
EKG T AXIS: 0 DEGREES
EKG VENTRICULAR RATE: 110 BPM
EST. AVERAGE GLUCOSE BLD GHB EST-MCNC: 108.3 MG/DL
FLUAV RNA RESP QL NAA+PROBE: NOT DETECTED
FLUBV RNA RESP QL NAA+PROBE: NOT DETECTED
GFR SERPLBLD CREATININE-BSD FMLA CKD-EPI: >90 ML/MIN/{1.73_M2}
GLUCOSE SERPL-MCNC: 119 MG/DL (ref 70–99)
HBA1C MFR BLD: 5.4 %
HCT VFR BLD AUTO: 37.4 % (ref 36–48)
HDLC SERPL-MCNC: 52 MG/DL (ref 40–60)
HGB BLD-MCNC: 12.6 G/DL (ref 12–16)
LDLC SERPL CALC-MCNC: 123 MG/DL
LEVETIRACETAM SERPL-MCNC: 8.6 UG/ML (ref 6–46)
MCH RBC QN AUTO: 26.7 PG (ref 26–34)
MCHC RBC AUTO-ENTMCNC: 33.7 G/DL (ref 31–36)
MCV RBC AUTO: 79.3 FL (ref 80–100)
MEDICATION DOSE-MCNC: NORMAL
ORGANISM: ABNORMAL
PLATELET # BLD AUTO: 270 K/UL (ref 135–450)
PMV BLD AUTO: 7.2 FL (ref 5–10.5)
POTASSIUM SERPL-SCNC: 4.2 MMOL/L (ref 3.5–5.1)
RBC # BLD AUTO: 4.71 M/UL (ref 4–5.2)
REPORT: NORMAL
RESP PATH DNA+RNA PNL NPH NAA+NON-PROBE: ABNORMAL
SARS-COV-2 RNA RESP QL NAA+PROBE: NOT DETECTED
SODIUM SERPL-SCNC: 136 MMOL/L (ref 136–145)
TRIGL SERPL-MCNC: 58 MG/DL (ref 0–150)
VLDLC SERPL CALC-MCNC: 12 MG/DL
WBC # BLD AUTO: 8.3 K/UL (ref 4–11)

## 2025-05-06 PROCEDURE — 80048 BASIC METABOLIC PNL TOTAL CA: CPT

## 2025-05-06 PROCEDURE — 6370000000 HC RX 637 (ALT 250 FOR IP)

## 2025-05-06 PROCEDURE — 76937 US GUIDE VASCULAR ACCESS: CPT

## 2025-05-06 PROCEDURE — 6370000000 HC RX 637 (ALT 250 FOR IP): Performed by: NURSE PRACTITIONER

## 2025-05-06 PROCEDURE — 2060000000 HC ICU INTERMEDIATE R&B

## 2025-05-06 PROCEDURE — 97530 THERAPEUTIC ACTIVITIES: CPT

## 2025-05-06 PROCEDURE — 6370000000 HC RX 637 (ALT 250 FOR IP): Performed by: PSYCHIATRY & NEUROLOGY

## 2025-05-06 PROCEDURE — 97535 SELF CARE MNGMENT TRAINING: CPT

## 2025-05-06 PROCEDURE — 80061 LIPID PANEL: CPT

## 2025-05-06 PROCEDURE — 99222 1ST HOSP IP/OBS MODERATE 55: CPT | Performed by: NEUROMUSCULOSKELETAL MEDICINE & OMM

## 2025-05-06 PROCEDURE — 87636 SARSCOV2 & INF A&B AMP PRB: CPT

## 2025-05-06 PROCEDURE — 80177 DRUG SCRN QUAN LEVETIRACETAM: CPT

## 2025-05-06 PROCEDURE — 94761 N-INVAS EAR/PLS OXIMETRY MLT: CPT

## 2025-05-06 PROCEDURE — 36415 COLL VENOUS BLD VENIPUNCTURE: CPT

## 2025-05-06 PROCEDURE — 95819 EEG AWAKE AND ASLEEP: CPT | Performed by: NEUROMUSCULOSKELETAL MEDICINE & OMM

## 2025-05-06 PROCEDURE — 2500000003 HC RX 250 WO HCPCS: Performed by: NURSE PRACTITIONER

## 2025-05-06 PROCEDURE — 97166 OT EVAL MOD COMPLEX 45 MIN: CPT

## 2025-05-06 PROCEDURE — 93010 ELECTROCARDIOGRAM REPORT: CPT | Performed by: INTERNAL MEDICINE

## 2025-05-06 PROCEDURE — 97162 PT EVAL MOD COMPLEX 30 MIN: CPT

## 2025-05-06 PROCEDURE — 92526 ORAL FUNCTION THERAPY: CPT

## 2025-05-06 PROCEDURE — 0202U NFCT DS 22 TRGT SARS-COV-2: CPT

## 2025-05-06 PROCEDURE — 92610 EVALUATE SWALLOWING FUNCTION: CPT

## 2025-05-06 PROCEDURE — 70551 MRI BRAIN STEM W/O DYE: CPT

## 2025-05-06 PROCEDURE — 85027 COMPLETE CBC AUTOMATED: CPT

## 2025-05-06 PROCEDURE — 94640 AIRWAY INHALATION TREATMENT: CPT

## 2025-05-06 PROCEDURE — 83036 HEMOGLOBIN GLYCOSYLATED A1C: CPT

## 2025-05-06 PROCEDURE — 6360000002 HC RX W HCPCS: Performed by: NURSE PRACTITIONER

## 2025-05-06 PROCEDURE — 6370000000 HC RX 637 (ALT 250 FOR IP): Performed by: INTERNAL MEDICINE

## 2025-05-06 PROCEDURE — 95819 EEG AWAKE AND ASLEEP: CPT

## 2025-05-06 PROCEDURE — 2700000000 HC OXYGEN THERAPY PER DAY

## 2025-05-06 RX ORDER — OXYCODONE AND ACETAMINOPHEN 5; 325 MG/1; MG/1
1 TABLET ORAL ONCE
Refills: 0 | Status: COMPLETED | OUTPATIENT
Start: 2025-05-06 | End: 2025-05-06

## 2025-05-06 RX ORDER — TRAMADOL HYDROCHLORIDE 50 MG/1
50 TABLET ORAL
Status: COMPLETED | OUTPATIENT
Start: 2025-05-06 | End: 2025-05-06

## 2025-05-06 RX ORDER — BUTALBITAL, ACETAMINOPHEN AND CAFFEINE 50; 325; 40 MG/1; MG/1; MG/1
1 TABLET ORAL EVERY 4 HOURS PRN
Status: DISCONTINUED | OUTPATIENT
Start: 2025-05-06 | End: 2025-05-13 | Stop reason: HOSPADM

## 2025-05-06 RX ORDER — IPRATROPIUM BROMIDE AND ALBUTEROL SULFATE 2.5; .5 MG/3ML; MG/3ML
1 SOLUTION RESPIRATORY (INHALATION)
Status: DISCONTINUED | OUTPATIENT
Start: 2025-05-06 | End: 2025-05-09

## 2025-05-06 RX ORDER — GUAIFENESIN/DEXTROMETHORPHAN 100-10MG/5
10 SYRUP ORAL EVERY 4 HOURS PRN
Status: DISCONTINUED | OUTPATIENT
Start: 2025-05-06 | End: 2025-05-07

## 2025-05-06 RX ADMIN — OXYCODONE HYDROCHLORIDE AND ACETAMINOPHEN 1 TABLET: 5; 325 TABLET ORAL at 08:48

## 2025-05-06 RX ADMIN — GUAIFENESIN AND DEXTROMETHORPHAN 10 ML: 100; 10 SYRUP ORAL at 08:48

## 2025-05-06 RX ADMIN — CIPROFLOXACIN HYDROCHLORIDE AND HYDROCORTISONE 4 DROP: 2; 10 SUSPENSION/ DROPS AURICULAR (OTIC) at 22:28

## 2025-05-06 RX ADMIN — CIPROFLOXACIN HYDROCHLORIDE AND HYDROCORTISONE 4 DROP: 2; 10 SUSPENSION/ DROPS AURICULAR (OTIC) at 14:39

## 2025-05-06 RX ADMIN — IPRATROPIUM BROMIDE AND ALBUTEROL SULFATE 1 DOSE: .5; 3 SOLUTION RESPIRATORY (INHALATION) at 14:51

## 2025-05-06 RX ADMIN — ENOXAPARIN SODIUM 30 MG: 100 INJECTION SUBCUTANEOUS at 08:36

## 2025-05-06 RX ADMIN — SODIUM CHLORIDE, PRESERVATIVE FREE 10 ML: 5 INJECTION INTRAVENOUS at 22:29

## 2025-05-06 RX ADMIN — IPRATROPIUM BROMIDE AND ALBUTEROL SULFATE 1 DOSE: .5; 3 SOLUTION RESPIRATORY (INHALATION) at 21:12

## 2025-05-06 RX ADMIN — IPRATROPIUM BROMIDE AND ALBUTEROL SULFATE 1 DOSE: .5; 3 SOLUTION RESPIRATORY (INHALATION) at 09:05

## 2025-05-06 RX ADMIN — GABAPENTIN 900 MG: 300 CAPSULE ORAL at 11:37

## 2025-05-06 RX ADMIN — PREDNISONE 40 MG: 20 TABLET ORAL at 08:35

## 2025-05-06 RX ADMIN — ENOXAPARIN SODIUM 30 MG: 100 INJECTION SUBCUTANEOUS at 22:28

## 2025-05-06 RX ADMIN — GABAPENTIN 1200 MG: 400 CAPSULE ORAL at 22:27

## 2025-05-06 RX ADMIN — TRAMADOL HYDROCHLORIDE 50 MG: 50 TABLET, COATED ORAL at 03:32

## 2025-05-06 RX ADMIN — LEVETIRACETAM 1000 MG: 100 INJECTION INTRAVENOUS at 17:19

## 2025-05-06 RX ADMIN — GABAPENTIN 600 MG: 300 CAPSULE ORAL at 08:35

## 2025-05-06 RX ADMIN — BUTALBITAL, ACETAMINOPHEN, AND CAFFEINE 1 TABLET: 325; 50; 40 TABLET ORAL at 15:25

## 2025-05-06 RX ADMIN — SERTRALINE 100 MG: 50 TABLET, FILM COATED ORAL at 08:48

## 2025-05-06 RX ADMIN — ASPIRIN 81 MG: 81 TABLET, CHEWABLE ORAL at 08:35

## 2025-05-06 RX ADMIN — LEVETIRACETAM 1000 MG: 100 INJECTION INTRAVENOUS at 05:18

## 2025-05-06 RX ADMIN — PANTOPRAZOLE SODIUM 40 MG: 40 TABLET, DELAYED RELEASE ORAL at 05:19

## 2025-05-06 RX ADMIN — GUAIFENESIN AND DEXTROMETHORPHAN 10 ML: 100; 10 SYRUP ORAL at 14:39

## 2025-05-06 RX ADMIN — BUTALBITAL, ACETAMINOPHEN, AND CAFFEINE 1 TABLET: 325; 50; 40 TABLET ORAL at 22:36

## 2025-05-06 ASSESSMENT — PAIN SCALES - GENERAL
PAINLEVEL_OUTOF10: 8
PAINLEVEL_OUTOF10: 8
PAINLEVEL_OUTOF10: 6
PAINLEVEL_OUTOF10: 5
PAINLEVEL_OUTOF10: 2
PAINLEVEL_OUTOF10: 5

## 2025-05-06 ASSESSMENT — PAIN DESCRIPTION - DESCRIPTORS
DESCRIPTORS: ACHING;SORE
DESCRIPTORS: ACHING;DISCOMFORT
DESCRIPTORS: SORE;ACHING
DESCRIPTORS: ACHING

## 2025-05-06 ASSESSMENT — PAIN DESCRIPTION - ORIENTATION
ORIENTATION: MID
ORIENTATION: MID
ORIENTATION: RIGHT;LEFT

## 2025-05-06 ASSESSMENT — PAIN DESCRIPTION - LOCATION
LOCATION: GENERALIZED
LOCATION: GENERALIZED;HEAD
LOCATION: CHEST;HEAD
LOCATION: GENERALIZED
LOCATION: CHEST;HEAD
LOCATION: GENERALIZED

## 2025-05-06 ASSESSMENT — PAIN - FUNCTIONAL ASSESSMENT: PAIN_FUNCTIONAL_ASSESSMENT: ACTIVITIES ARE NOT PREVENTED

## 2025-05-06 NOTE — PROGRESS NOTES
..Shift assessment completed. Routine vitals stable. Scheduled medications given. Patient is awake, alert and oriented. Respirations are easy and unlabored. Patient does not appear to be in distress, resting comfortably at this time. Call light within reach.  Assisted patient to use the BSC with assist of 2, patient unsteady especially on the left side. Dr Cosby in the room and new orders in. Respiratory panel collected and sent to the lab. Patient sitting at the bedside eating breakfast.

## 2025-05-06 NOTE — ED PROVIDER NOTES
[facility] EMERGENCY DEPARTMENT  EMERGENCY DEPARTMENT ENCOUNTER        Pt Name: Cely Ochoa  MRN: [id number]  Birthdate 1985  Date of evaluation: 5/5/2025  Provider: La Oneil PA-C  PCP: Leigha Meneses DO  Note Started: 8:08 PM EDT 5/5/25       I have seen and evaluated this patient with my supervising physician Irving Garcia MD.      CHIEF COMPLAINT       Chief Complaint   Patient presents with    Seizures     Patient in by [facility] form home, initially called for SOB and then patient began seizing, had 2 seizures, given 5mg Versed IM and 5mg Versed IV. Patient has hx of stroke with left sided deficits and hx of seizures.       HISTORY OF PRESENT ILLNESS: 1 or more Elements     History From: Patient  Limitations to history : None    Cely Ochoa is a 39 y.o. female who presents to the emergency department today for evaluation for concerns of a seizure.  Patient does have a history of seizures, history of a stroke and is baseline weak on the left side.  Wife did call for seizure, and patient was given 5 of Versed IM and then 5 of Versed IV    The patient arrives to the ED she is sleeping comfortably but is easily awakened.  She states that she is having some pain to the back of her head, and reports that she is unsure if she may have hit her head.  Patient states that she has not missed any doses of her seizure medication.  Patient tells me that she is weak in the left side of her body and this is from her previous stroke.  Patient reports that her strokes are typically is triggered by stress, and she states that she has had a lot of stress from her recent health problems.  She is reporting a tightness across her chest which seems to wax and wane on its own.  She has no fevers.  No cough.  No shortness of breath.  No vomiting or diarrhea.  She denies any numbness, ting or weakness however,    Nursing Notes were all reviewed and agreed with or any disagreements were addressed in

## 2025-05-06 NOTE — PROGRESS NOTES
Speech Language Pathology  [facility] - Inpatient Rehabilitation Services  [phone]  SLP Clinical Swallow Evaluation       Patient: Cely Ochoa   : 1985   MRN: [id number]      Evaluation Date: 2025      Admitting Dx: Facial droop [R29.810]  Acute left-sided weakness [R53.1]  Seizure-like activity (HCC) [R56.9]  Treatment Diagnosis: Oropharyngeal Dysphagia   Pain: Did not state                                  Recommendations      Recommended Diet and Intervention 2025:  Diet Solids Recommendation:  Regular texture diet  Liquid Consistency Recommendation:  Thin liquids  Recommended form of Meds: Meds whole with water or Meds in puree       Based on today's assessment recommend consideration of referral to Gastroenterology (GI) and Further assessment of esophageal phase of swallow (esophagram)  due to the pt reporting symptoms of globus sensation    Compensatory strategies: Aspiration Precautions , Alternate solids/liquids , Eat or Feed Slowly, Lingual Sweep , Small Bites and Sips , Upright as possible with all PO intake     Discharge Recommendations:  Recommend ongoing SLP for speech and dysphagia therapy upon discharge from hospital     History/Course of Treatment     H&P: Ceyl Ochoa is a 39 y.o. female with pmh of asthma, obesity, seizures who presents with shortness of breath and seizures.  Patient initially presented to the emergency room for shortness of breath and seizures.  Her spouse is at bedside and states since Thursday she has had some upper respiratory symptoms.  She denies any fever.  She had a coughing episode at home and felt very short of breath so they called EMS.  Patient then vomited and had a seizure.  Wife reports tonic clonic seizure.  She had 1st seizure at 5:30pm.  She then proceeded to have 3 additional seizures prior to getting the emergency room.  And 1 seizure in the emergency room that was unwitnessed by nurses..  Patient was postictal on presentation to

## 2025-05-06 NOTE — PROGRESS NOTES
[facility] - Inpatient Rehabilitation Department   Phone: [phone]    Physical Therapy    [x] Initial Evaluation            [] Daily Treatment Note         [] Discharge Summary      Patient: Cely Ochoa   : 1985   MRN: [id number]   Date of Service:  2025  Admitting Diagnosis: Acute left-sided weakness  Current Admission Summary:  Cely Ochoa is a 39 y.o. female with pmh of asthma, obesity, seizures who presents with shortness of breath and seizures.  Patient initially presented to the emergency room for shortness of breath and seizures.  Her spouse is at bedside and states since Thursday she has had some upper respiratory symptoms.  She denies any fever.  She had a coughing episode at home and felt very short of breath so they called EMS.  Patient then vomited and had a seizure.  Wife reports tonic clonic seizure.  She had 1st seizure at 5:30pm.  She then proceeded to have 3 additional seizures prior to getting the emergency room.  And 1 seizure in the emergency room that was unwitnessed by nurses..  Patient was postictal on presentation to ER.  When she started wake up spouse noticed left facial droop with left arm and leg weakness.  Wife does states she has had increasing seizures over the last year last one was in February.  She does tend to have seizures when she is ill.   She is currently not established with neurology but takes Keppra 1gm BID.   Stroke team was called  she was not a TNK candidate secondary to seizure activity.   Past Medical History:  has a past medical history of Anxiety, Asthma, Cerebrovascular disease, Depression, Left foot drop, Meningitis, Seizures (HCC), and Stroke (HCC).  Past Surgical History:  has a past surgical history that includes Tonsillectomy; skin biopsy; and Tooth Extraction (2019).    Discharge Recommendations: Cely Ochoa scored a 10/24 on the AM-PAC short mobility form. Current research shows that an AM-PAC score of 17 or less is typically  EXAM NOTE      HISTORY    Shayna Gomez is a 31 year old female who is here to establish care.  She recently moved to the area.  She has a history of eosinophilic esophagitis which does cause some recurring symptoms.  She would like ongoing evaluation for that.  No blood in her stools.  She does deal with some chronic fatigue issues.  She has a history of some urinary frequency and dysuria      MEDICAL HISTORY    Patient Active Problem List    Diagnosis Date Noted   • EE (eosinophilic esophagitis) 03/31/2022     Priority: Low        SOCIAL HISTORY    Shayna reports that she has never smoked. She has never used smokeless tobacco.    MEDICATIONS    Current Outpatient Medications   Medication Sig   • budesonide (PULMICORT) 1 MG/2ML nebulizer suspension Take 1 mg by nebulization daily. Puts in applesauce   • cetirizine (ZyrTEC) 10 MG tablet Take 10 mg by mouth daily.     No current facility-administered medications for this visit.        ALLERGIES:   Allergen Reactions   • Clindamycin HIVES and SWELLING       REVIEW OF SYSTEMS    Reviewed        PHYSICAL EXAM    Visit Vitals  /72   Pulse 93   Temp 98 °F (36.7 °C)   Ht 5' 5\" (1.651 m)   Wt 98.9 kg (218 lb)   SpO2 97%   BMI 36.28 kg/m²     Constitutional:  Alert, no acute distress.  Integument:  Warm. Dry. No erythema. No rashes or jaundice.        ASSESSMENT & PLAN    1. EE (eosinophilic esophagitis)    - SERVICE TO GASTROENTEROLOGY  - SERVICE TO ALLERGY & IMMUNOLOGY  - COMPREHENSIVE METABOLIC PANEL; Future  - URINALYSIS WITH MICROSCOPY & CULTURE IF INDICATED    2. Dysuria    - COMPREHENSIVE METABOLIC PANEL; Future  - URINALYSIS WITH MICROSCOPY & CULTURE IF INDICATED          situation  Command Following:   WFL    Education  Barriers To Learning: cognition  Patient Education: patient educated on goals, PT role and benefits, plan of care, functional mobility training, proper use of assistive device/equipment, family education, transfer training, discharge recommendations  Learning Assessment:  patient verbalizes understanding, would benefit from continued reinforcement    Assessment  Activity Tolerance: Fair, limited by lethargy, LUE/LLE weakness  Impairments Requiring Therapeutic Intervention: decreased functional mobility, decreased ADL status, decreased ROM, decreased strength, decreased cognition, decreased endurance, decreased sensation, decreased balance, increased pain, decreased posture  Prognosis: good  Clinical Assessment:  Pt is limited by the above deficits and would benefit from skilled PT services to maximize pt functional mobility and safety prior to discharge. Pt is far below her baseline with L sided weakness, requiring mod A of 2 for mobility, and is unable to amb at this time. Pt would benefit from rehabilitation prior to discharge home.     Safety Interventions: patient left in bed, bed alarm in place, call light within reach, gait belt, patient at risk for falls, nurse notified, family/caregiver present, and seizure pad in place    Plan  Frequency: 5-7 x/week  Current Treatment Recommendations: strengthening, ROM, balance training, functional mobility training, transfer training, gait training, endurance training, neuromuscular re-education, patient/caregiver education, safety education, equipment evaluation/education, and positioning    Goals  Patient Goals: get stronger   Short Term Goals:  Time Frame: Discharge  Patient will complete bed mobility at supervision   Patient will complete transfers at moderate assistance   Patient will ambulate 10 ft with use of rolling walker at moderate assistance    Above goals reviewed on 5/6/2025.  All goals are ongoing at this

## 2025-05-06 NOTE — CARE COORDINATION
Discharge Planning Note:    Chart reviewed and it appears that patient has minimal needs for discharge at this time. Risk Score 13 %     Primary Care Physician is Leigha Meneses DO    Primary insurance is [facility] medicare    Please notify case management if any discharge needs are identified.      Case management will continue to follow progress and update discharge plan as needed.    Electronically signed by Hayes Cruz on 5/6/2025 at 8:31 AM

## 2025-05-06 NOTE — PROGRESS NOTES
Patient downstairs for EEG, tech called to report patient was SOB, advised to put patient on 2 liters per N/C, no further issues reported.

## 2025-05-06 NOTE — PROCEDURES
PROCEDURE NOTE    Date: 5/6/2025   Name: Cely Ochoa  YOB: 1985  Account #: [id number]  EEG awake and asleep    Date/Time: 5/6/2025 4:10 PM    Performed by: Jessica Davis MD  Authorized by: DIXON Navarro, APRN - CNP  Comments: Clinical History:  Hx of Epilepsy, admitted with seizures and left side weakness. On Keppra and Gabapentin.    Method:     This 20 channel digital EEG recording which was performed utilizing the   international 10:20 of electrode placements system.   Referential and bipolar montages were used.      Description:     The background of the EEG is showing well-developed normal alpha rhythm   with posterior head region distribution attenuation with eye opening and   eye closure maneuvers.  No asymmetry seen.   No spike or sharp or focal slow wave discharges were seen.   Drowsiness then Sleep was achieved during this study with appropriate rhythm changes.    Hyperventilation was not performed due to dyspnea and patient using oxygen    Photic stimulation was performed and did not induce any abnormal responses.     Impression:      Normal EEG during the awake and drowsy and sleep state.     No epileptiform discharges or other abnormalities were seen      Normal EEG does not rule out a clinical diagnosis of seizures or epilepsy   and clinical correlation is advised.     Jessica Davis MD   Neurology

## 2025-05-06 NOTE — RT PROTOCOL NOTE
RT Nebulizer Bronchodilator Protocol Note    There is a bronchodilator order in the chart from a provider indicating to follow the RT Bronchodilator Protocol and there is an “Initiate RT Bronchodilator Protocol” order as well (see protocol at bottom of note).    CXR Findings:  No results found.    The findings from the last RT Protocol Assessment were as follows:  Smoking: Chronic pulmonary disease  Respiratory Pattern: Dyspnea on exertion or RR 21-25 bpm  Breath Sounds: Slightly diminished and/or crackles  Cough: Strong, productive  Indication for Bronchodilator Therapy:    Bronchodilator Assessment Score: 7    Aerosolized bronchodilator medication orders have been revised according to the RT Nebulizer Bronchodilator Protocol below.    Respiratory Therapist to perform RT Therapy Protocol Assessment initially then follow the protocol.  Repeat RT Therapy Protocol Assessment PRN for score 0-3 or on second treatment, BID, and PRN for scores above 3.    No Indications - adjust the frequency to every 6 hours PRN wheezing or bronchospasm, if no treatments needed after 48 hours then discontinue using Per Protocol order mode.     If indication present, adjust the RT bronchodilator orders based on the Bronchodilator Assessment Score as indicated below.  If a patient is on this medication at home then do not decrease Frequency below that used at home.    0-3 - enter or revise RT bronchodilator order(s) to equivalent RT Bronchodilator order with Frequency of every 4 hours PRN for wheezing or increased work of breathing using Per Protocol order mode.       4-6 - enter or revise RT Bronchodilator order(s) to two equivalent RT bronchodilator orders with one order with BID Frequency and one order with Frequency of every 4 hours PRN wheezing or increased work of breathing using Per Protocol order mode.         7-10 - enter or revise RT Bronchodilator order(s) to two equivalent RT bronchodilator orders with one order with TID

## 2025-05-06 NOTE — ED NOTES
Patient Name: Cely Ochoa  : 1985 39 y.o.  MRN: [id number]  ED Room #: ED-0001/     Chief complaint:   Chief Complaint   Patient presents with    Seizures     Patient in by [facility] form home, initially called for SOB and then patient began seizing, had 2 seizures, given 5mg Versed IM and 5mg Versed IV. Patient has hx of stroke with left sided deficits and hx of seizures.     Hospital Problem/Diagnosis:   Hospital Problems           Last Modified POA    * (Principal) Acute left-sided weakness 2025 Yes         O2 Flow Rate:O2 Device: Nasal cannula O2 Flow Rate (L/min): 2 L/min (if applicable)  Cardiac Rhythm:   (if applicable)  Active LDA's:   Peripheral IV 25 Right Antecubital (Active)            How does patient ambulate? Unknown, did not assess in the Emergency Department    2. How does patient take pills? Whole with Water    3. Is patient alert? Alert    4. Is patient oriented? To Person, To Place, To Time, and To Situation    5.   Patient arrived from:  home  Facility Name: ___________________________________________    6. If patient is disoriented or from a Skill Nursing Facility has family been notified of admission? No    7. Patient belongings? Belongings: Cell Phone, Wallet, and Clothing    Disposition of belongings? Kept with Patient     8. Any specific patient or family belongings/needs/dynamics?   a. N/A    9. Miscellaneous comments/pending orders?  a. ED orders complete at this time.       If there are any additional questions please reach out to the Emergency Department.

## 2025-05-06 NOTE — H&P
V2.0  History and Physical      Name:  Cely Ochoa /Age/Sex: 1985  (39 y.o. female)   MRN & CSN:  [id number] & [id number] Encounter Date/Time: 2025 10:12 PM EDT   Location:  - PCP: Leigha Meneses DO       Hospital Day: 1    Assessment and Plan:   Cely Ochoa is a 39 y.o. female  who presents with Acute left-sided weakness    Hospital Problems           Last Modified POA    * (Principal) Acute left-sided weakness 2025 Yes       Assessment/Plan  Left Sided Weakness   Admit patient inpatient with telemetry monitoring  Neurochecks every 4 hours  Start daily aspirin  MRI brain in the morning  Neurology consult  Check lipids hemoglobin A1c in a.m.  PT ,OT, and speech consult  Bedside swallow if passes can advance to diet  2D echo  NIHSS on exam was 6, for left facial droop left arm and leg weakness with ataxia  She did have multiple seizures and then left-sided weakness was noted, could be Brody's paralysis but will rule out acute CVA  Stroke team was consulted did not TNK candidate secondary to seizures    2. Seizures  We will continue her Keppra we will switch to IV until her swallow study has been clear  Seizure precaution   Prn ativan   Neurology consult in the am, neurology was consulted by ER and updated the patient has had 4-5 seizures today okay for her to stay at [facility]    3. Acute Bronchitis  Cough, wheezing, afebrile x 4 days  Hx asthma,   will add oral steroids, no antibiotics at this time  Duonebs    4. Obesity      Disposition:   Current Living situation: home  Expected Disposition: to be determined   Estimated D/C: 3    Diet No diet orders on file   DVT Prophylaxis [x] Lovenox, []  Heparin, [] SCDs, [] Ambulation,  [] Eliquis, [] Xarelto, [] Coumadin   Code Status No Order   Surrogate Decision Maker/ POA      Personally reviewed Lab Studies and Imaging     Discussed management of the case with JON Nagel in ER who recommended admission     EKG interpreted    gabapentin (NEURONTIN) 600 MG tablet Take 1 tablet by mouth 3 times daily for 90 days. 2/17/25 5/18/25  Leigha Meneses DO   ibuprofen (ADVIL;MOTRIN) 800 MG tablet Take 1 tablet by mouth 2 times daily as needed for Pain 1/15/25 5/15/25  Leigha Meneses DO   cetirizine (ZYRTEC) 5 MG tablet Take 1 tablet by mouth daily    Provider, MD Patrick   diphenhydrAMINE (BENADRYL) 25 MG capsule Take 1 capsule by mouth every 6 hours as needed for Itching    Provider, MD Patrick   levETIRAcetam (KEPPRA) 250 MG tablet TAKE FOUR TABLETS BY MOUTH TWICE A DAY 12/23/24   Leigha Meneses DO   sertraline (ZOLOFT) 100 MG tablet Take 1 tablet by mouth daily 11/5/24 5/4/25  Leigha Meneses DO   traZODone (DESYREL) 50 MG tablet Take 1 tablet by mouth nightly 11/5/24 5/4/25  Leigha Meneses DO   albuterol sulfate HFA (VENTOLIN HFA) 108 (90 Base) MCG/ACT inhaler Inhale 2 puffs into the lungs 4 times daily as needed for Wheezing 4/22/24 2/17/25  Leigha Meneses DO   acetaminophen (TYLENOL) 500 MG tablet Take 2 tablets by mouth 2 times daily as needed for Pain 4/22/24 2/17/25  Leigha Meneses DO   omeprazole (PRILOSEC) 20 MG delayed release capsule Take 1 capsule by mouth daily 4/22/24 2/17/25  Leigha Meneses DO       Physical Exam:    Physical Exam  Vitals and nursing note reviewed.   Constitutional:       Comments: Drowsy but awake   HENT:      Head: Normocephalic.      Mouth/Throat:      Mouth: Mucous membranes are moist.   Eyes:      Pupils: Pupils are equal, round, and reactive to light.   Cardiovascular:      Rate and Rhythm: Normal rate and regular rhythm.      Pulses: Normal pulses.      Heart sounds: No murmur heard.  Pulmonary:      Effort: Pulmonary effort is normal. No respiratory distress.      Breath sounds: Wheezing present.   Abdominal:      General: Abdomen is flat. Bowel sounds are normal. There is no distension.      Palpations: Abdomen is soft.   Musculoskeletal:      Cervical back: Normal range of motion.      Right

## 2025-05-06 NOTE — PLAN OF CARE
Problem: Safety - Adult  Goal: Free from fall injury  Outcome: Progressing     Problem: Chronic Conditions and Co-morbidities  Goal: Patient's chronic conditions and co-morbidity symptoms are monitored and maintained or improved  Outcome: Progressing     Problem: Discharge Planning  Goal: Discharge to home or other facility with appropriate resources  Outcome: Progressing     Problem: Pain  Goal: Verbalizes/displays adequate comfort level or baseline comfort level  Outcome: Progressing     Problem: Skin/Tissue Integrity  Goal: Skin integrity remains intact  Description: 1.  Monitor for areas of redness and/or skin breakdown2.  Assess vascular access sites hourly3.  Every 4-6 hours minimum:  Change oxygen saturation probe site4.  Every 4-6 hours:  If on nasal continuous positive airway pressure, respiratory therapy assess nares and determine need for appliance change or resting period  Outcome: Progressing

## 2025-05-06 NOTE — PROGRESS NOTES
MRI questionnaire was completed by night shift but not faxed or printed and signed by patient. Questionnaire done again and sent to MRI.

## 2025-05-06 NOTE — PROGRESS NOTES
[facility] - Inpatient Rehabilitation Department   Phone: [phone]    Occupational Therapy    [x] Initial Evaluation            [] Daily Treatment Note         [] Discharge Summary      Patient: Cely Ochoa   : 1985   MRN: [id number]   Date of Service:  2025    Admitting Diagnosis:  Acute left-sided weakness  Current Admission Summary: Cely Ochoa is a 39 y.o. female with pmh of asthma, obesity, seizures who presents with shortness of breath and seizures.  Patient initially presented to the emergency room for shortness of breath and seizures.  Her spouse is at bedside and states since Thursday she has had some upper respiratory symptoms.  She denies any fever.  She had a coughing episode at home and felt very short of breath so they called EMS.  Patient then vomited and had a seizure.  Wife reports tonic clonic seizure.  She had 1st seizure at 5:30pm.  She then proceeded to have 3 additional seizures prior to getting the emergency room.  And 1 seizure in the emergency room that was unwitnessed by nurses..  Patient was postictal on presentation to ER.  When she started wake up spouse noticed left facial droop with left arm and leg weakness.  Wife does states she has had increasing seizures over the last year last one was in February.  She does tend to have seizures when she is ill.   She is currently not established with neurology but takes Keppra 1gm BID.   Stroke team was called  she was not a TNK candidate secondary to seizure activity.   Past Medical History:  has a past medical history of Anxiety, Asthma, Cerebrovascular disease, Depression, Left foot drop, Meningitis, Seizures (HCC), and Stroke (HCC).  Past Surgical History:  has a past surgical history that includes Tonsillectomy; skin biopsy; and Tooth Extraction (2019).    Discharge Recommendations: Cely Ochoa scored a  on the AM-PAC ADL Inpatient form. Current research shows that an AM-PAC score of 17 or less is

## 2025-05-06 NOTE — PROGRESS NOTES
05/06/25 0308   RT Protocol   History Pulmonary Disease 2   Respiratory pattern 2   Breath sounds 2   Cough 1   Bronchodilator Assessment Score 7

## 2025-05-06 NOTE — CONSULTS
In patient Neurology consult    [facility] Neurology        Cely Ochoa  1985    Date of Service: 5/6/2025    Referring Physician: Renée Cosby MD      Reason for the consult and CC: Acute left-sided weakness, breakthrough seizure    HPI:   The patient is a 39 y.o.  years old female with past medical history of anxiety, depression, seizures and other medical problem who comes to the hospital with shortness of breath and seizures.  Degree severe duration persistent.  Patient hardly had upper respiratory symptoms ongoing for several days.  Patient reportedly had episode of emesis at home.  This was followed by a seizure which family describes as generalized motor seizure.  No tongue biting, no bowel or bladder incontinence.  EMS was called.  Patient reportedly had few more generalized motor seizure episodes prior to coming to the emergency room.   Upon arriving to the emergency room patient had another episode which was described as staring off and not responding.  Patient was confused during her course in the emergency room.  Per documentation, patient reportedly was responding to touch and verbal stimuli during this time.  Patient did received 10 mg Versed.  Patient was noted to have left facial droop, left arm and leg weakness.  CT head showed no acute intracranial abnormality, CTA showed no LVO.  Case was discussed with neurology and continuous EEG was not recommended.  Stroke team was also consulted and TNK was deferred.  She was admitted to the hospital further evaluation.  Neurology was consulted MRI brain was ordered.    Chart reviewed discussed with family at bedside.    Patient with history of seizure disorder.  Currently on Keppra 1000 mg twice daily.  Patient also on gabapentin 600/900/1200 daily.  She previously saw neurology and was referred to EMU stay.  Patient had EMU stay last year which was inconclusive.  She was continued on Keppra 1 g twice daily.  She reports compliance with  05/06/2025 05:21 AM     Lab Results   Component Value Date/Time    WBC 8.3 05/06/2025 05:21 AM    RBC 4.71 05/06/2025 05:21 AM    HGB 12.6 05/06/2025 05:21 AM    HCT 37.4 05/06/2025 05:21 AM    MCV 79.3 05/06/2025 05:21 AM    RDW 16.7 05/06/2025 05:21 AM     05/06/2025 05:21 AM   No results found for: \"INR\", \"PROTIME\"    Neuroimaging was independently reviewed by myself and discussed results with the patient  Reviewed notes from different physicians including H&P and ED notes.  Reviewed lab and blood testing    Impression:     Acute left-sided weakness, rule out new TIA/CVA.  Other possibly could be due to to recent breakthrough seizure  Breakthrough seizure.    History of seizure disorder.    Anxiety/depression, not controlled      Recommendation:     Continue supportive care  MRI brain was requested  Echocardiogram  EEG  Aspirin  Statin  Continue Keppra 1 g twice daily  She is also on gabapentin 600/900/1200.  Ultimately will need to follow-up with neurology outpatient for continued management of her seizures and may need to repeat EMU hospitalization.  PT and OT  Speech  Aspiration precaution  GI and DVT prophylaxis  Discharge planning      Thank you for referring such patient. If you have any questions regarding my consult note, please don't hesitate to call me.     DIXON Navarro, APRN - CNP        This dictation was generated by voice recognition computer software. Although all attempts are made to edit the dictation for accuracy, there may be errors in the  transcription that are not intended

## 2025-05-06 NOTE — PROGRESS NOTES
4 Eyes Skin Assessment     NAME:  Cely Ochoa  YOB: 1985  MEDICAL RECORD NUMBER:  [id number]    The patient is being assessed for  Admission    I agree that at least one RN has performed a thorough Head to Toe Skin Assessment on the patient. ALL assessment sites listed below have been assessed.      Areas assessed by both nurses:    Head, Face, Ears, Shoulders, Back, Chest, Arms, Elbows, Hands, Sacrum. Buttock, Coccyx, Ischium, and Legs. Feet and Heels        Does the Patient have a Wound? No noted wound(s)       Todd Prevention initiated by RN: Yes  Wound Care Orders initiated by RN: No    Pressure Injury (Stage 3,4, Unstageable, DTI, NWPT, and Complex wounds) if present, place Wound referral order by RN under : No    New Ostomies, if present place, Ostomy referral order under : No     Nurse 1 eSignature: Electronically signed by Jason Hyde RN on 5/6/25 at 3:54 AM EDT    **SHARE this note so that the co-signing nurse can place an eSignature**    Nurse 2 eSignature: Electronically signed by ANA LUISA SKELTON RN on 5/6/25 at 4:05 AM EDT

## 2025-05-06 NOTE — PROGRESS NOTES
V2.0    [facility] Progress Note      Name:  Cely Ochoa /Age/Sex: 1985  (39 y.o. female)   MRN & CSN:  [id number] & [id number] Encounter Date/Time: 2025 1:05 PM EDT   Location:  [facility]3367/3367-01 PCP: Leigha Meneses DO     Attending:Renée Cosby MD       Hospital Day: 2    Assessment and Recommendations   Cely Ochoa is a 39 y.o. female with pmh of CVA, Asthma, obesity, seizures presented with shortness of breath, seizures and left sided weakness.      Left Sided Weakness and numbness:  CT head negative.  CTA head and Neck showed no LVO.  Neuro checks every 4 hours  Continue ASA, Statin.  MRI brain, TTE, A1c, Lipid panel.  Neurology consulted  PT ,OT, and speech consult    Breakthrough Seizures: Continue AEDs. Seizure precaution.    Acute Asthma Exacerbation:   CT chest negative for acute abnormality.  Continue Steroid and Duonebs.      Obesity: Body mass index is 43.72 kg/m².   BMI Complicating assessment and treatment. Placing patient at risk for multiple co-morbidities as well as early death and contributing to the patient's presentation. Education, and counseling provided.     Diet ADULT DIET; Regular   DVT Prophylaxis [x] Lovenox, []  Heparin, [] SCDs, [] Ambulation,  [] Eliquis, [] Xarelto  [] Coumadin   Code Status Full Code   Disposition From: Home  Expected Disposition: Home  Estimated Date of Discharge: PT/OT  Patient requires continued admission due to Left sided weakness   Surrogate Decision Maker/ FARAZ  Bonnie Ochoa (Spouse)  [phone]     Personally reviewed Lab Studies and Imaging     Subjective:     Chief Complaint: : Seizure, left arm/leg weakness     Patient seen and examined.   c/o cough, wheezing, sore throat.  Stable left sided weakness and numbness.  R ear pain.  Generalized body pain,no fever or chills.        Review of Systems:      A 10-12 system review obtained and updated today and is unremarkable except as mentioned  in my interval history.     Objective:

## 2025-05-06 NOTE — PROGRESS NOTES
Speech Pathology  Attempt  Celylev Ochoa  1985    Orders received to complete speech evaluation. Pt's chart reviewed and RN consulted, cleared for entry. Pt is working with respiratory therapy upon SLP's arrival. Will re-attempt as schedule allows and pt appropriate.       Isela Wilcox M.S. CCC-SLP #[id number]  Speech-Language Pathologist

## 2025-05-07 ENCOUNTER — APPOINTMENT (OUTPATIENT)
Age: 40
DRG: 101 | End: 2025-05-07
Payer: MEDICARE

## 2025-05-07 LAB
ANION GAP SERPL CALCULATED.3IONS-SCNC: 12 MMOL/L (ref 3–16)
BUN SERPL-MCNC: 8 MG/DL (ref 7–20)
CALCIUM SERPL-MCNC: 8.3 MG/DL (ref 8.3–10.6)
CHLORIDE SERPL-SCNC: 107 MMOL/L (ref 99–110)
CO2 SERPL-SCNC: 20 MMOL/L (ref 21–32)
CREAT SERPL-MCNC: 0.6 MG/DL (ref 0.6–1.1)
DEPRECATED RDW RBC AUTO: 16.4 % (ref 12.4–15.4)
ECHO AO ASC DIAM: 3 CM
ECHO AO ASCENDING AORTA INDEX: 1.2 CM/M2
ECHO AO ROOT DIAM: 3 CM
ECHO AO ROOT INDEX: 1.2 CM/M2
ECHO AV AREA PEAK VELOCITY: 2.1 CM2
ECHO AV AREA VTI: 2.4 CM2
ECHO AV AREA/BSA PEAK VELOCITY: 0.8 CM2/M2
ECHO AV AREA/BSA VTI: 1 CM2/M2
ECHO AV MEAN GRADIENT: 7 MMHG
ECHO AV MEAN VELOCITY: 1.3 M/S
ECHO AV PEAK GRADIENT: 14 MMHG
ECHO AV PEAK VELOCITY: 1.9 M/S
ECHO AV VELOCITY RATIO: 0.58
ECHO AV VTI: 34.5 CM
ECHO BSA: 2.61 M2
ECHO EST RA PRESSURE: 3 MMHG
ECHO LA AREA 2C: 18.6 CM2
ECHO LA AREA 4C: 18.4 CM2
ECHO LA MAJOR AXIS: 5.7 CM
ECHO LA MINOR AXIS: 5.7 CM
ECHO LA VOL BP: 49 ML (ref 22–52)
ECHO LA VOL MOD A2C: 50 ML (ref 22–52)
ECHO LA VOL MOD A4C: 48 ML (ref 22–52)
ECHO LA VOL/BSA BIPLANE: 20 ML/M2 (ref 16–34)
ECHO LA VOLUME INDEX MOD A2C: 20 ML/M2 (ref 16–34)
ECHO LA VOLUME INDEX MOD A4C: 19 ML/M2 (ref 16–34)
ECHO LV E' LATERAL VELOCITY: 11.4 CM/S
ECHO LV E' SEPTAL VELOCITY: 10.1 CM/S
ECHO LV EDV A2C: 124 ML
ECHO LV EDV A4C: 126 ML
ECHO LV EDV INDEX A4C: 51 ML/M2
ECHO LV EDV NDEX A2C: 50 ML/M2
ECHO LV EJECTION FRACTION 3D: 60 %
ECHO LV EJECTION FRACTION A2C: 64 %
ECHO LV EJECTION FRACTION A4C: 62 %
ECHO LV EJECTION FRACTION BIPLANE: 63 % (ref 55–100)
ECHO LV ESV A2C: 45 ML
ECHO LV ESV A4C: 48 ML
ECHO LV ESV INDEX A2C: 18 ML/M2
ECHO LV ESV INDEX A4C: 19 ML/M2
ECHO LV FRACTIONAL SHORTENING: 42 % (ref 28–44)
ECHO LV INTERNAL DIMENSION DIASTOLE INDEX: 2.09 CM/M2
ECHO LV INTERNAL DIMENSION DIASTOLIC: 5.2 CM (ref 3.9–5.3)
ECHO LV INTERNAL DIMENSION SYSTOLIC INDEX: 1.2 CM/M2
ECHO LV INTERNAL DIMENSION SYSTOLIC: 3 CM
ECHO LV IVSD: 1 CM (ref 0.6–0.9)
ECHO LV MASS 2D: 181.4 G (ref 67–162)
ECHO LV MASS INDEX 2D: 72.8 G/M2 (ref 43–95)
ECHO LV POSTERIOR WALL DIASTOLIC: 0.9 CM (ref 0.6–0.9)
ECHO LV RELATIVE WALL THICKNESS RATIO: 0.35
ECHO LVOT AREA: 3.5 CM2
ECHO LVOT AV VTI INDEX: 0.68
ECHO LVOT DIAM: 2.1 CM
ECHO LVOT MEAN GRADIENT: 3 MMHG
ECHO LVOT PEAK GRADIENT: 5 MMHG
ECHO LVOT PEAK VELOCITY: 1.1 M/S
ECHO LVOT STROKE VOLUME INDEX: 32.8 ML/M2
ECHO LVOT SV: 81.7 ML
ECHO LVOT VTI: 23.6 CM
ECHO MV A VELOCITY: 0.86 M/S
ECHO MV AREA VTI: 2.5 CM2
ECHO MV E VELOCITY: 1.04 M/S
ECHO MV E/A RATIO: 1.21
ECHO MV E/E' LATERAL: 9.12
ECHO MV E/E' RATIO (AVERAGED): 9.71
ECHO MV E/E' SEPTAL: 10.3
ECHO MV LVOT VTI INDEX: 1.38
ECHO MV MAX VELOCITY: 1.1 M/S
ECHO MV MEAN GRADIENT: 3 MMHG
ECHO MV MEAN VELOCITY: 0.8 M/S
ECHO MV PEAK GRADIENT: 5 MMHG
ECHO MV VTI: 32.6 CM
ECHO PV MAX VELOCITY: 1.1 M/S
ECHO PV PEAK GRADIENT: 5 MMHG
ECHO RA AREA 4C: 15.9 CM2
ECHO RA END SYSTOLIC VOLUME APICAL 4 CHAMBER INDEX BSA: 15 ML/M2
ECHO RA VOLUME: 37 ML
ECHO RIGHT VENTRICULAR SYSTOLIC PRESSURE (RVSP): 24 MMHG
ECHO RV BASAL DIMENSION: 3.4 CM
ECHO RV FREE WALL PEAK S': 12.3 CM/S
ECHO RV LONGITUDINAL DIMENSION: 8.1 CM
ECHO RV MID DIMENSION: 2.7 CM
ECHO RV TAPSE: 2.5 CM (ref 1.7–?)
ECHO TV REGURGITANT MAX VELOCITY: 2.28 M/S
ECHO TV REGURGITANT PEAK GRADIENT: 21 MMHG
FERRITIN SERPL IA-MCNC: 16.7 NG/ML (ref 15–150)
GFR SERPLBLD CREATININE-BSD FMLA CKD-EPI: >90 ML/MIN/{1.73_M2}
GLUCOSE SERPL-MCNC: 102 MG/DL (ref 70–99)
HCT VFR BLD AUTO: 36.2 % (ref 36–48)
HGB BLD-MCNC: 12 G/DL (ref 12–16)
IRON SATN MFR SERPL: 6 % (ref 15–50)
IRON SERPL-MCNC: 23 UG/DL (ref 37–145)
MCH RBC QN AUTO: 26.4 PG (ref 26–34)
MCHC RBC AUTO-ENTMCNC: 33.2 G/DL (ref 31–36)
MCV RBC AUTO: 79.5 FL (ref 80–100)
PLATELET # BLD AUTO: 276 K/UL (ref 135–450)
PMV BLD AUTO: 7.2 FL (ref 5–10.5)
POTASSIUM SERPL-SCNC: 3.5 MMOL/L (ref 3.5–5.1)
RBC # BLD AUTO: 4.56 M/UL (ref 4–5.2)
SODIUM SERPL-SCNC: 139 MMOL/L (ref 136–145)
TIBC SERPL-MCNC: 373 UG/DL (ref 260–445)
WBC # BLD AUTO: 10.3 K/UL (ref 4–11)

## 2025-05-07 PROCEDURE — 94761 N-INVAS EAR/PLS OXIMETRY MLT: CPT

## 2025-05-07 PROCEDURE — 2060000000 HC ICU INTERMEDIATE R&B

## 2025-05-07 PROCEDURE — 2700000000 HC OXYGEN THERAPY PER DAY

## 2025-05-07 PROCEDURE — 2500000003 HC RX 250 WO HCPCS: Performed by: NURSE PRACTITIONER

## 2025-05-07 PROCEDURE — 36415 COLL VENOUS BLD VENIPUNCTURE: CPT

## 2025-05-07 PROCEDURE — 97110 THERAPEUTIC EXERCISES: CPT

## 2025-05-07 PROCEDURE — 6360000002 HC RX W HCPCS: Performed by: INTERNAL MEDICINE

## 2025-05-07 PROCEDURE — 82728 ASSAY OF FERRITIN: CPT

## 2025-05-07 PROCEDURE — 80048 BASIC METABOLIC PNL TOTAL CA: CPT

## 2025-05-07 PROCEDURE — 97112 NEUROMUSCULAR REEDUCATION: CPT

## 2025-05-07 PROCEDURE — 6370000000 HC RX 637 (ALT 250 FOR IP): Performed by: INTERNAL MEDICINE

## 2025-05-07 PROCEDURE — 6370000000 HC RX 637 (ALT 250 FOR IP): Performed by: PSYCHIATRY & NEUROLOGY

## 2025-05-07 PROCEDURE — 83550 IRON BINDING TEST: CPT

## 2025-05-07 PROCEDURE — 94640 AIRWAY INHALATION TREATMENT: CPT

## 2025-05-07 PROCEDURE — APPSS30 APP SPLIT SHARED TIME 16-30 MINUTES

## 2025-05-07 PROCEDURE — 85027 COMPLETE CBC AUTOMATED: CPT

## 2025-05-07 PROCEDURE — 6370000000 HC RX 637 (ALT 250 FOR IP): Performed by: NURSE PRACTITIONER

## 2025-05-07 PROCEDURE — 97530 THERAPEUTIC ACTIVITIES: CPT

## 2025-05-07 PROCEDURE — 93306 TTE W/DOPPLER COMPLETE: CPT

## 2025-05-07 PROCEDURE — 83540 ASSAY OF IRON: CPT

## 2025-05-07 PROCEDURE — 97535 SELF CARE MNGMENT TRAINING: CPT

## 2025-05-07 PROCEDURE — APPNB30 APP NON BILLABLE TIME 0-30 MINS

## 2025-05-07 PROCEDURE — 2580000003 HC RX 258: Performed by: INTERNAL MEDICINE

## 2025-05-07 PROCEDURE — 2500000003 HC RX 250 WO HCPCS: Performed by: INTERNAL MEDICINE

## 2025-05-07 PROCEDURE — 93306 TTE W/DOPPLER COMPLETE: CPT | Performed by: INTERNAL MEDICINE

## 2025-05-07 PROCEDURE — 6360000002 HC RX W HCPCS: Performed by: NURSE PRACTITIONER

## 2025-05-07 RX ORDER — POLYETHYLENE GLYCOL 3350 17 G/17G
17 POWDER, FOR SOLUTION ORAL 2 TIMES DAILY
Status: DISCONTINUED | OUTPATIENT
Start: 2025-05-07 | End: 2025-05-13 | Stop reason: HOSPADM

## 2025-05-07 RX ORDER — CODEINE PHOSPHATE AND GUAIFENESIN 10; 100 MG/5ML; MG/5ML
10 SOLUTION ORAL EVERY 6 HOURS
Status: DISCONTINUED | OUTPATIENT
Start: 2025-05-07 | End: 2025-05-12

## 2025-05-07 RX ORDER — OXYCODONE AND ACETAMINOPHEN 5; 325 MG/1; MG/1
1 TABLET ORAL EVERY 4 HOURS PRN
Refills: 0 | Status: DISCONTINUED | OUTPATIENT
Start: 2025-05-07 | End: 2025-05-13 | Stop reason: HOSPADM

## 2025-05-07 RX ORDER — BISACODYL 5 MG/1
10 TABLET, DELAYED RELEASE ORAL ONCE
Status: COMPLETED | OUTPATIENT
Start: 2025-05-07 | End: 2025-05-07

## 2025-05-07 RX ORDER — BENZONATATE 100 MG/1
200 CAPSULE ORAL 3 TIMES DAILY
Status: DISCONTINUED | OUTPATIENT
Start: 2025-05-07 | End: 2025-05-12

## 2025-05-07 RX ADMIN — POLYETHYLENE GLYCOL 3350 17 G: 17 POWDER, FOR SOLUTION ORAL at 10:55

## 2025-05-07 RX ADMIN — SODIUM CHLORIDE, PRESERVATIVE FREE 10 ML: 5 INJECTION INTRAVENOUS at 14:10

## 2025-05-07 RX ADMIN — Medication 10 ML: at 14:10

## 2025-05-07 RX ADMIN — OXYCODONE AND ACETAMINOPHEN 1 TABLET: 325; 5 TABLET ORAL at 10:55

## 2025-05-07 RX ADMIN — CIPROFLOXACIN HYDROCHLORIDE AND HYDROCORTISONE 4 DROP: 2; 10 SUSPENSION/ DROPS AURICULAR (OTIC) at 21:17

## 2025-05-07 RX ADMIN — GABAPENTIN 1200 MG: 400 CAPSULE ORAL at 21:18

## 2025-05-07 RX ADMIN — Medication 10 ML: at 10:55

## 2025-05-07 RX ADMIN — SODIUM CHLORIDE 125 MG: 9 INJECTION, SOLUTION INTRAVENOUS at 14:15

## 2025-05-07 RX ADMIN — ASPIRIN 81 MG: 81 TABLET, CHEWABLE ORAL at 10:55

## 2025-05-07 RX ADMIN — SODIUM CHLORIDE, PRESERVATIVE FREE 10 ML: 5 INJECTION INTRAVENOUS at 10:55

## 2025-05-07 RX ADMIN — BENZONATATE 200 MG: 100 CAPSULE ORAL at 10:55

## 2025-05-07 RX ADMIN — GUAIFENESIN AND DEXTROMETHORPHAN 10 ML: 100; 10 SYRUP ORAL at 04:50

## 2025-05-07 RX ADMIN — SODIUM CHLORIDE, PRESERVATIVE FREE 10 ML: 5 INJECTION INTRAVENOUS at 06:43

## 2025-05-07 RX ADMIN — BENZONATATE 200 MG: 100 CAPSULE ORAL at 14:10

## 2025-05-07 RX ADMIN — SODIUM CHLORIDE, PRESERVATIVE FREE 10 ML: 5 INJECTION INTRAVENOUS at 21:19

## 2025-05-07 RX ADMIN — SERTRALINE 100 MG: 50 TABLET, FILM COATED ORAL at 10:55

## 2025-05-07 RX ADMIN — ENOXAPARIN SODIUM 30 MG: 100 INJECTION SUBCUTANEOUS at 21:19

## 2025-05-07 RX ADMIN — GABAPENTIN 900 MG: 300 CAPSULE ORAL at 14:10

## 2025-05-07 RX ADMIN — BUTALBITAL, ACETAMINOPHEN, AND CAFFEINE 1 TABLET: 325; 50; 40 TABLET ORAL at 05:06

## 2025-05-07 RX ADMIN — LEVETIRACETAM 1000 MG: 100 INJECTION INTRAVENOUS at 06:42

## 2025-05-07 RX ADMIN — BISACODYL 10 MG: 5 TABLET, COATED ORAL at 10:55

## 2025-05-07 RX ADMIN — PANTOPRAZOLE SODIUM 40 MG: 40 TABLET, DELAYED RELEASE ORAL at 06:43

## 2025-05-07 RX ADMIN — CIPROFLOXACIN HYDROCHLORIDE AND HYDROCORTISONE 4 DROP: 2; 10 SUSPENSION/ DROPS AURICULAR (OTIC) at 10:55

## 2025-05-07 RX ADMIN — GABAPENTIN 600 MG: 300 CAPSULE ORAL at 10:55

## 2025-05-07 RX ADMIN — IPRATROPIUM BROMIDE AND ALBUTEROL SULFATE 1 DOSE: .5; 3 SOLUTION RESPIRATORY (INHALATION) at 08:07

## 2025-05-07 RX ADMIN — ENOXAPARIN SODIUM 30 MG: 100 INJECTION SUBCUTANEOUS at 10:55

## 2025-05-07 RX ADMIN — POLYETHYLENE GLYCOL 3350 17 G: 17 POWDER, FOR SOLUTION ORAL at 21:17

## 2025-05-07 RX ADMIN — PREDNISONE 40 MG: 20 TABLET ORAL at 10:55

## 2025-05-07 RX ADMIN — LEVETIRACETAM 1000 MG: 100 INJECTION INTRAVENOUS at 18:10

## 2025-05-07 RX ADMIN — BENZONATATE 200 MG: 100 CAPSULE ORAL at 21:18

## 2025-05-07 RX ADMIN — IPRATROPIUM BROMIDE AND ALBUTEROL SULFATE 1 DOSE: .5; 3 SOLUTION RESPIRATORY (INHALATION) at 14:29

## 2025-05-07 RX ADMIN — WATER 40 MG: 1 INJECTION INTRAMUSCULAR; INTRAVENOUS; SUBCUTANEOUS at 21:19

## 2025-05-07 RX ADMIN — IPRATROPIUM BROMIDE AND ALBUTEROL SULFATE 1 DOSE: .5; 3 SOLUTION RESPIRATORY (INHALATION) at 22:47

## 2025-05-07 RX ADMIN — Medication 10 ML: at 21:18

## 2025-05-07 RX ADMIN — OXYCODONE AND ACETAMINOPHEN 1 TABLET: 325; 5 TABLET ORAL at 16:15

## 2025-05-07 RX ADMIN — WATER 40 MG: 1 INJECTION INTRAMUSCULAR; INTRAVENOUS; SUBCUTANEOUS at 14:10

## 2025-05-07 ASSESSMENT — PAIN SCALES - GENERAL: PAINLEVEL_OUTOF10: 7

## 2025-05-07 ASSESSMENT — PAIN DESCRIPTION - ORIENTATION: ORIENTATION: LEFT

## 2025-05-07 ASSESSMENT — PAIN DESCRIPTION - LOCATION: LOCATION: GENERALIZED;ANKLE

## 2025-05-07 NOTE — PROGRESS NOTES
Cely Ochoa  Neurology Follow-up  [facility] Neurology    Date of Service: 5/7/2025    Subjective:   CC: Follow up today regarding: Acute left-sided weakness, breakthrough seizure    Events noted. Chart and lab reviewed.  Patient seen this morning family is at bedside.  Upon entering the room patient seen grasping water bottle with left hand.  She reports no significant change in left-sided weakness.  MRI brain showed no acute intracranial normality.  EEG without epileptiform discharges.  Echocardiogram results noted.  Keppra level 8.6.  Respiratory panel positive for human metapneumovirus.  I did speak with patient significant other bedside.  She does confirm patient has had recent stressors including grandmother who suffered a stroke.  Patient also has been primary caregiver for her significant other she recently underwent liver transplant.  Patient also recently prescribed tramadol which has been held on admission.  Patient recently has insurance restored.  Have an appointment with neurology later this month at .      ROS : A 10-12 system review obtained and updated today and is unremarkable except as mentioned  in my interval history.     Past medical history, social history, medication and family history reviewed.       Objective:  Exam:   Constitutional:   Vitals:    05/07/25 1037 05/07/25 1400 05/07/25 1429 05/07/25 1615   BP: 117/78 122/85  119/82   Pulse:  88 83 86   Resp:  16 16 16   Temp:  97.2 °F (36.2 °C)  97.7 °F (36.5 °C)   TempSrc:  Temporal  Temporal   SpO2:  100% 100% 98%   Weight: (!) 137.4 kg (303 lb)      Height: 1.778 m (5' 10\")        General appearance:  Normal development and appear in no acute distress.   Mental Status:   Oriented to person, place, problem, and time.    Memory: Good immediate recall.  Intact remote memory  Normal attention span and concentration.  Language: intact naming, repeating and fluency   Good fund of Knowledge.   Cranial Nerves:   II:   Pupils: equal,

## 2025-05-07 NOTE — CONSULTS
Cely Ochoa  5/7/2025  [id number]    Chief Complaint: Acute left-sided weakness    Subjective   HPI: Cely Ochoa is a 39 y.o. female with PMHx notable for depression, anxiety, meningitis, seizures, obesity who presented on 5/5/2025 with several days of upper respiratory symptoms, then on date of admission had an episode of emesis followed by what was described as a generalized motor seizure.  She had several other apparent seizure episodes, received Versed.  Neuroexam at that time was concerning for left facial droop, left arm and leg weakness. CT Head demonstrated no acute intracranial abnormality. CTA Head/Neck demonstrated no large vessel occlusion or flow-limiting stenosis. MRI Brain demonstrated no acute intracranial abnormality. TTE ordered, pending. Neurology consulted, recommends EEG monitoring, continuing Keppra and gabapentin, outpatient neurology follow-up with possible need for admission to epilepsy monitoring unit. EEG was WNL.  She was found to be positive for human metapneumovirus.  She is also currently on contact cautions for C. difficile rule out.      Patient reports that she is doing well today.  She is having some dyspnea and cough.  She continues to experience significant left-sided weakness, which in her opinion is overall unchanged from the time of admission.  She is also having some numbness and paresthesia involving her left arm and leg, with some painful paresthesias in the left foot and ankle.  She denies any cognitive deficits, vision changes, dysarthria or dysphagia.     Past Medical History:   Diagnosis Date    Anxiety 2020    Asthma     Cerebrovascular disease 11/2019    Depression 2018    Left foot drop     Meningitis     Seizures (HCC)     Stroke (HCC)        Past Surgical History:   Procedure Laterality Date    SKIN BIOPSY      TONSILLECTOMY      TOOTH EXTRACTION  2019       Social History     Tobacco Use    Smoking status: Some Days     Types: Cigars     Start date: 2019

## 2025-05-07 NOTE — PLAN OF CARE
Problem: Safety - Adult  Goal: Free from fall injury  5/7/2025 1440 by Sowmya Francisco, RN  Outcome: Progressing  Note: Patient remains absent from falls at this time.  Remains alert and oriented, up to chair with call light and belongings in reach.  Non-slip footwear on and chair alarm activated.  Fall precautions in place.  Patient encouraged to use call light to request assistance, v/u.  Will continue to monitor.     Problem: Pain  Goal: Verbalizes/displays adequate comfort level or baseline comfort level  5/7/2025 1440 by Sowmya Francisco, RN  Outcome: Progressing  Note: Patient denies any pain at this time.  Will continue to monitor.

## 2025-05-07 NOTE — CARE COORDINATION
Discharge Planning:    CM provided pt and family with ARU options.  They will decide and let CM know.    Electronically signed by Hayes Cruz on 5/7/2025 at 11:44 AM

## 2025-05-07 NOTE — PROGRESS NOTES
Called to the room, cough is constant with clear sputum. Saturation 100% on 2 liters. RT called for PRN HHN. In progress at this time. Will also give PRN Guaifenesin syrup.

## 2025-05-07 NOTE — PLAN OF CARE
Problem: Safety - Adult  Goal: Free from fall injury  Outcome: Progressing     Problem: Chronic Conditions and Co-morbidities  Goal: Patient's chronic conditions and co-morbidity symptoms are monitored and maintained or improved  Outcome: Progressing     Problem: Discharge Planning  Goal: Discharge to home or other facility with appropriate resources  Outcome: Progressing     Problem: Pain  Goal: Verbalizes/displays adequate comfort level or baseline comfort level  Outcome: Progressing     Problem: Skin/Tissue Integrity  Goal: Skin integrity remains intact  Description: 1.  Monitor for areas of redness and/or skin breakdown2.  Assess vascular access sites hourly3.  Every 4-6 hours minimum:  Change oxygen saturation probe site4.  Every 4-6 hours:  If on nasal continuous positive airway pressure, respiratory therapy assess nares and determine need for appliance change or resting period  Outcome: Progressing     Problem: Neurosensory - Adult  Goal: Achieves stable or improved neurological status  Outcome: Progressing  Goal: Achieves maximal functionality and self care  Outcome: Progressing     Problem: Infection - Adult  Goal: Absence of infection at discharge  Outcome: Progressing      Pt and parents verbalized understanding of discharge instructions; ambulated out of the ED without assistance with parents; vital signs stable at discharge; uneventful ED visit     Katherine Koenig RN  11/14/22 4520

## 2025-05-07 NOTE — PROGRESS NOTES
Advance Care Planning     Advance Care Planning Inpatient Note  Spiritual Care Department    Today's Date: 5/7/2025  Unit: [facility]    Received request from IDT Member, Jenn Omalley RN.  Upon review of chart and communication with care team, patient's decision making abilities are not in question.. Patient was/were present in the room during visit.    Goals of ACP Conversation:  Discuss advance care planning documents  Facilitate a discussion related to patient's goals of care as they align with the patient's values and beliefs.    Health Care Decision Makers:     No healthcare decision makers have been documented.    Summary:  No Decision Maker named by patient at this time    Advance Care Planning Documents (Patient Wishes):  None     Assessment:  Spiritual Health consult to facilitate completion of Health Care Power of  and Living Will. Pt was awake and alert. There was no  medical, psychological, psychosocial, family systems, ethical, cultural, societal and spiritual barriers at the time of visit.  Provided support through active listening, affirmed feelings. Thoughts concerns, conversation, presence, and blessing.    Interventions:  Provided education on documents for clarity and greater understanding  Discussed and provided education on state decision maker hierarchy  Encouraged ongoing ACP conversation with future decision makers and loved ones  Reviewed but did not complete ACP document    Care Preferences Communicated:   No    Outcomes/Plan:  ACP Discussion: Postponed. Pt requested to review/complete POA documents with spouse. Pt will notify the nurse to contact Spiritual Health Services when appropriate. Pt expressed hope and Gratitude.    Electronically signed by Chaplain Ara on 5/7/2025 at 9:12 AM

## 2025-05-07 NOTE — PROGRESS NOTES
[facility] - Inpatient Rehabilitation Department   Phone: [phone]    Occupational Therapy    [] Initial Evaluation            [x] Daily Treatment Note         [] Discharge Summary      Patient: Cely Ochoa   : 1985   MRN: [id number]   Date of Service:  2025    Admitting Diagnosis:  Acute left-sided weakness  Current Admission Summary: Cely Ochoa is a 39 y.o. female with pmh of asthma, obesity, seizures who presents with shortness of breath and seizures.  Patient initially presented to the emergency room for shortness of breath and seizures.  Her spouse is at bedside and states since Thursday she has had some upper respiratory symptoms.  She denies any fever.  She had a coughing episode at home and felt very short of breath so they called EMS.  Patient then vomited and had a seizure.  Wife reports tonic clonic seizure.  She had 1st seizure at 5:30pm.  She then proceeded to have 3 additional seizures prior to getting the emergency room.  And 1 seizure in the emergency room that was unwitnessed by nurses..  Patient was postictal on presentation to ER.  When she started wake up spouse noticed left facial droop with left arm and leg weakness.  Wife does states she has had increasing seizures over the last year last one was in February.  She does tend to have seizures when she is ill.   She is currently not established with neurology but takes Keppra 1gm BID.   Stroke team was called  she was not a TNK candidate secondary to seizure activity.   Past Medical History:  has a past medical history of Anxiety, Asthma, Cerebrovascular disease, Depression, Left foot drop, Meningitis, Seizures (HCC), and Stroke (HCC).  Past Surgical History:  has a past surgical history that includes Tonsillectomy; skin biopsy; and Tooth Extraction (2019).    Discharge Recommendations: Cely Ochoa scored a 12 on the AM-PAC ADL Inpatient form. Current research shows that an AM-PAC score of 17 or less is  present.  Pain: Patient does not rate upon questioningPt stating she did \"hurt all over but not now  Pain Interventions: pain medication in place prior to arrival, repositioned , and therapy activities modified        Activities of Daily Living  Basic Activities of Daily Living  Grooming: setup assistance stand by assistance  Grooming Comments: pt washed hands at sink seated on [facility] paddles  Lower Extremity Dressing: dependent  Dressing Equipment: none  Dressing Comments: Dependent socks and brief, in stance in [facility] for this writer to pull up.  Toileting: maximum assistance.    Toileting Equipment: none  Toileting Comments: pt with seated marty hygiene care seated on toilet, Dependent for brief mgmt in stance [facility]  Comments:   Instrumental Activities of Daily Living  No IADL completed on this date.    Functional Mobility  Bed Mobility:  Sit to Supine: stand by assistance  Rolling Right: stand by assistance  Scooting: stand by assistance  Comments: pt hooks (L) LE under (R) LE during the above transitions, heavy use of bed rail  Transfers:  Sit to stand transfer:minimal assistance  Stand to sit transfer: contact guard assistance  Toilet transfer: stedy utilized requiring Min A   Toilet transfer equipment: standard toilet  Toilet transfer comments: pt declined BSC, requested to use toilet  Comments: 1 sit to stand to rw and then weight shifting in stance rw.     Functional Mobility  No functional mobility completed on this date secondary to unsafe to attempt this date.  Balance:  Static Sitting Balance: fair (+): maintains balance at SBA/supervision without use of UE support  Dynamic Sitting Balance: fair (+): maintains balance at SBA/supervision without use of UE support  Static Standing Balance: poor (+): requires min (A) to maintain balance  Dynamic Standing Balance: poor (+): requires min (A) to maintain balance    Other Therapeutic Interventions  SUKH DE LEON 10 reps shoulder flexion, wrist

## 2025-05-07 NOTE — PROGRESS NOTES
[facility] - Inpatient Rehabilitation Department   Phone: [phone]    Physical Therapy    [] Initial Evaluation            [x] Daily Treatment Note         [] Discharge Summary      Patient: Cely Ochoa   : 1985   MRN: [id number]   Date of Service:  2025  Admitting Diagnosis: Acute left-sided weakness  Current Admission Summary:  Cely Ochoa is a 39 y.o. female with pmh of asthma, obesity, seizures who presents with shortness of breath and seizures.  Patient initially presented to the emergency room for shortness of breath and seizures.  Her spouse is at bedside and states since Thursday she has had some upper respiratory symptoms.  She denies any fever.  She had a coughing episode at home and felt very short of breath so they called EMS.  Patient then vomited and had a seizure.  Wife reports tonic clonic seizure.  She had 1st seizure at 5:30pm.  She then proceeded to have 3 additional seizures prior to getting the emergency room.  And 1 seizure in the emergency room that was unwitnessed by nurses..  Patient was postictal on presentation to ER.  When she started wake up spouse noticed left facial droop with left arm and leg weakness.  Wife does states she has had increasing seizures over the last year last one was in February.  She does tend to have seizures when she is ill.   She is currently not established with neurology but takes Keppra 1gm BID.   Stroke team was called  she was not a TNK candidate secondary to seizure activity.   Past Medical History:  has a past medical history of Anxiety, Asthma, Cerebrovascular disease, Depression, Left foot drop, Meningitis, Seizures (HCC), and Stroke (HCC).  Past Surgical History:  has a past surgical history that includes Tonsillectomy; skin biopsy; and Tooth Extraction (2019).    Discharge Recommendations: Cely Ochoa scored a  on the AM-PAC short mobility form. Current research shows that an AM-PAC score of 17 or less is typically

## 2025-05-07 NOTE — PROGRESS NOTES
V2.0    [facility] Progress Note      Name:  Cely Ochoa /Age/Sex: 1985  (39 y.o. female)   MRN & CSN:  [id number] & [id number] Encounter Date/Time: 2025 1:05 PM EDT   Location:  [facility]3367/3367-01 PCP: Leigha Meneses DO     Attending:Renée Cosby MD       Hospital Day: 3    Assessment and Recommendations   Cely Ochoa is a 39 y.o. female with pmh of CVA, Asthma, obesity, seizures presented with shortness of breath, seizures and left sided weakness.      Left Sided Weakness and numbness:  CT head negative.  CTA head and Neck showed no LVO.  MRI brain negative for acute abnormality.  Neuro checks every 4 hours  Continue ASA, Statin.  Neurology consult appreciated: Recommendations noted.  PT ,OT, and speech consult    Breakthrough Seizures: Continue AEDs. Seizure precaution.    Human metapneumovirus infection with acute Asthma Exacerbation:   CT chest negative for acute abnormality.  Continue Steroid and Duonebs.   Wean off supplemental oxygen as tolerated.  Antitussives.    Iron deficiency anemia: Started on IV iron.     Obesity: Body mass index is 43.48 kg/m².   BMI Complicating assessment and treatment. Placing patient at risk for multiple co-morbidities as well as early death and contributing to the patient's presentation. Education, and counseling provided.     Diet ADULT DIET; Regular   DVT Prophylaxis [x] Lovenox, []  Heparin, [] SCDs, [] Ambulation,  [] Eliquis, [] Xarelto  [] Coumadin   Code Status Full Code   Disposition From: Home  Expected Disposition: Home  Estimated Date of Discharge: ARU  Patient requires continued admission due to Left sided weakness   Surrogate Decision Maker/ FARAZ  Ochoa,Bonnie (Spouse)  [phone]     Personally reviewed Lab Studies and Imaging     Subjective:     Chief Complaint: : Seizure, left arm/leg weakness     Patient seen and examined.   c/o cough, wheezing, sore throat.  Stable left sided weakness and numbness.  R ear pain resolving.  Generalized body

## 2025-05-08 PROCEDURE — 97530 THERAPEUTIC ACTIVITIES: CPT

## 2025-05-08 PROCEDURE — 2500000003 HC RX 250 WO HCPCS: Performed by: INTERNAL MEDICINE

## 2025-05-08 PROCEDURE — 6370000000 HC RX 637 (ALT 250 FOR IP): Performed by: PSYCHIATRY & NEUROLOGY

## 2025-05-08 PROCEDURE — 2700000000 HC OXYGEN THERAPY PER DAY

## 2025-05-08 PROCEDURE — 6370000000 HC RX 637 (ALT 250 FOR IP): Performed by: NURSE PRACTITIONER

## 2025-05-08 PROCEDURE — 6360000002 HC RX W HCPCS: Performed by: INTERNAL MEDICINE

## 2025-05-08 PROCEDURE — 97535 SELF CARE MNGMENT TRAINING: CPT

## 2025-05-08 PROCEDURE — 2580000003 HC RX 258: Performed by: INTERNAL MEDICINE

## 2025-05-08 PROCEDURE — 97110 THERAPEUTIC EXERCISES: CPT

## 2025-05-08 PROCEDURE — APPNB30 APP NON BILLABLE TIME 0-30 MINS

## 2025-05-08 PROCEDURE — 2060000000 HC ICU INTERMEDIATE R&B

## 2025-05-08 PROCEDURE — 6370000000 HC RX 637 (ALT 250 FOR IP): Performed by: INTERNAL MEDICINE

## 2025-05-08 PROCEDURE — 94640 AIRWAY INHALATION TREATMENT: CPT

## 2025-05-08 PROCEDURE — 94761 N-INVAS EAR/PLS OXIMETRY MLT: CPT

## 2025-05-08 PROCEDURE — 6360000002 HC RX W HCPCS: Performed by: NURSE PRACTITIONER

## 2025-05-08 PROCEDURE — 2500000003 HC RX 250 WO HCPCS: Performed by: NURSE PRACTITIONER

## 2025-05-08 PROCEDURE — APPSS30 APP SPLIT SHARED TIME 16-30 MINUTES

## 2025-05-08 RX ORDER — ALBUTEROL SULFATE 0.83 MG/ML
2.5 SOLUTION RESPIRATORY (INHALATION) EVERY 6 HOURS PRN
Status: DISCONTINUED | OUTPATIENT
Start: 2025-05-08 | End: 2025-05-13 | Stop reason: HOSPADM

## 2025-05-08 RX ADMIN — LEVETIRACETAM 1000 MG: 100 INJECTION INTRAVENOUS at 17:30

## 2025-05-08 RX ADMIN — IPRATROPIUM BROMIDE AND ALBUTEROL SULFATE 1 DOSE: .5; 3 SOLUTION RESPIRATORY (INHALATION) at 20:40

## 2025-05-08 RX ADMIN — BENZONATATE 200 MG: 100 CAPSULE ORAL at 09:45

## 2025-05-08 RX ADMIN — POLYETHYLENE GLYCOL 3350 17 G: 17 POWDER, FOR SOLUTION ORAL at 09:45

## 2025-05-08 RX ADMIN — CIPROFLOXACIN HYDROCHLORIDE AND HYDROCORTISONE 4 DROP: 2; 10 SUSPENSION/ DROPS AURICULAR (OTIC) at 09:45

## 2025-05-08 RX ADMIN — GABAPENTIN 600 MG: 300 CAPSULE ORAL at 09:45

## 2025-05-08 RX ADMIN — SODIUM CHLORIDE, PRESERVATIVE FREE 10 ML: 5 INJECTION INTRAVENOUS at 09:45

## 2025-05-08 RX ADMIN — ENOXAPARIN SODIUM 30 MG: 100 INJECTION SUBCUTANEOUS at 09:45

## 2025-05-08 RX ADMIN — BENZONATATE 200 MG: 100 CAPSULE ORAL at 21:45

## 2025-05-08 RX ADMIN — IPRATROPIUM BROMIDE AND ALBUTEROL SULFATE 1 DOSE: .5; 3 SOLUTION RESPIRATORY (INHALATION) at 14:53

## 2025-05-08 RX ADMIN — Medication 10 ML: at 02:17

## 2025-05-08 RX ADMIN — Medication 10 ML: at 14:30

## 2025-05-08 RX ADMIN — WATER 40 MG: 1 INJECTION INTRAMUSCULAR; INTRAVENOUS; SUBCUTANEOUS at 13:15

## 2025-05-08 RX ADMIN — Medication 10 ML: at 21:45

## 2025-05-08 RX ADMIN — OXYCODONE AND ACETAMINOPHEN 1 TABLET: 325; 5 TABLET ORAL at 21:45

## 2025-05-08 RX ADMIN — BENZONATATE 200 MG: 100 CAPSULE ORAL at 13:15

## 2025-05-08 RX ADMIN — OXYCODONE AND ACETAMINOPHEN 1 TABLET: 325; 5 TABLET ORAL at 02:59

## 2025-05-08 RX ADMIN — ALBUTEROL SULFATE 2.5 MG: 2.5 SOLUTION RESPIRATORY (INHALATION) at 02:24

## 2025-05-08 RX ADMIN — GABAPENTIN 900 MG: 300 CAPSULE ORAL at 13:15

## 2025-05-08 RX ADMIN — WATER 40 MG: 1 INJECTION INTRAMUSCULAR; INTRAVENOUS; SUBCUTANEOUS at 06:12

## 2025-05-08 RX ADMIN — Medication 2 PUFF: at 02:21

## 2025-05-08 RX ADMIN — OXYCODONE AND ACETAMINOPHEN 1 TABLET: 325; 5 TABLET ORAL at 09:55

## 2025-05-08 RX ADMIN — WATER 40 MG: 1 INJECTION INTRAMUSCULAR; INTRAVENOUS; SUBCUTANEOUS at 20:45

## 2025-05-08 RX ADMIN — GABAPENTIN 1200 MG: 400 CAPSULE ORAL at 20:46

## 2025-05-08 RX ADMIN — SERTRALINE 100 MG: 50 TABLET, FILM COATED ORAL at 09:45

## 2025-05-08 RX ADMIN — Medication 10 ML: at 09:45

## 2025-05-08 RX ADMIN — LEVETIRACETAM 1000 MG: 100 INJECTION INTRAVENOUS at 06:11

## 2025-05-08 RX ADMIN — SODIUM CHLORIDE, PRESERVATIVE FREE 10 ML: 5 INJECTION INTRAVENOUS at 06:12

## 2025-05-08 RX ADMIN — SODIUM CHLORIDE 125 MG: 9 INJECTION, SOLUTION INTRAVENOUS at 13:25

## 2025-05-08 RX ADMIN — SODIUM CHLORIDE, PRESERVATIVE FREE 10 ML: 5 INJECTION INTRAVENOUS at 20:47

## 2025-05-08 RX ADMIN — CIPROFLOXACIN HYDROCHLORIDE AND HYDROCORTISONE 4 DROP: 2; 10 SUSPENSION/ DROPS AURICULAR (OTIC) at 20:47

## 2025-05-08 RX ADMIN — ASPIRIN 81 MG: 81 TABLET, CHEWABLE ORAL at 09:45

## 2025-05-08 RX ADMIN — IPRATROPIUM BROMIDE AND ALBUTEROL SULFATE 1 DOSE: .5; 3 SOLUTION RESPIRATORY (INHALATION) at 09:33

## 2025-05-08 RX ADMIN — ENOXAPARIN SODIUM 30 MG: 100 INJECTION SUBCUTANEOUS at 20:44

## 2025-05-08 RX ADMIN — PANTOPRAZOLE SODIUM 40 MG: 40 TABLET, DELAYED RELEASE ORAL at 06:12

## 2025-05-08 ASSESSMENT — PAIN DESCRIPTION - FREQUENCY: FREQUENCY: OTHER (COMMENT)

## 2025-05-08 ASSESSMENT — PAIN DESCRIPTION - LOCATION
LOCATION: CHEST;RIB CAGE

## 2025-05-08 ASSESSMENT — PAIN SCALES - GENERAL
PAINLEVEL_OUTOF10: 7
PAINLEVEL_OUTOF10: 6
PAINLEVEL_OUTOF10: 7
PAINLEVEL_OUTOF10: 7

## 2025-05-08 ASSESSMENT — PAIN DESCRIPTION - ORIENTATION
ORIENTATION: LEFT;RIGHT
ORIENTATION: LEFT;RIGHT
ORIENTATION: RIGHT;LEFT

## 2025-05-08 ASSESSMENT — PAIN DESCRIPTION - DESCRIPTORS: DESCRIPTORS: ACHING

## 2025-05-08 ASSESSMENT — PAIN - FUNCTIONAL ASSESSMENT: PAIN_FUNCTIONAL_ASSESSMENT: ACTIVITIES ARE NOT PREVENTED

## 2025-05-08 ASSESSMENT — PAIN DESCRIPTION - PAIN TYPE: TYPE: ACUTE PAIN

## 2025-05-08 NOTE — PLAN OF CARE
Problem: Safety - Adult  Goal: Free from fall injury  Outcome: Progressing     Problem: Chronic Conditions and Co-morbidities  Goal: Patient's chronic conditions and co-morbidity symptoms are monitored and maintained or improved  Outcome: Progressing     Problem: Discharge Planning  Goal: Discharge to home or other facility with appropriate resources  Outcome: Progressing     Problem: Pain  Goal: Verbalizes/displays adequate comfort level or baseline comfort level  Outcome: Progressing     Problem: Skin/Tissue Integrity  Goal: Skin integrity remains intact  Description: 1.  Monitor for areas of redness and/or skin breakdown2.  Assess vascular access sites hourly3.  Every 4-6 hours minimum:  Change oxygen saturation probe site4.  Every 4-6 hours:  If on nasal continuous positive airway pressure, respiratory therapy assess nares and determine need for appliance change or resting period  Outcome: Progressing     Problem: Neurosensory - Adult  Goal: Achieves stable or improved neurological status  Outcome: Progressing  Goal: Achieves maximal functionality and self care  Outcome: Progressing     Problem: Infection - Adult  Goal: Absence of infection at discharge  Outcome: Progressing     Problem: Musculoskeletal - Adult  Goal: Return mobility to safest level of function  Outcome: Progressing     Problem: Respiratory - Adult  Goal: Achieves optimal ventilation and oxygenation  Outcome: Progressing     Problem: Gastrointestinal - Adult  Goal: Minimal or absence of nausea and vomiting  Outcome: Progressing  Goal: Maintains or returns to baseline bowel function  Outcome: Progressing  Goal: Maintains adequate nutritional intake  Outcome: Progressing     Problem: Genitourinary - Adult  Goal: Absence of urinary retention  Outcome: Progressing

## 2025-05-08 NOTE — PROGRESS NOTES
[facility] - Inpatient Rehabilitation Department   Phone: [phone]    Occupational Therapy    [] Initial Evaluation            [x] Daily Treatment Note         [] Discharge Summary      Patient: Cely Ochoa   : 1985   MRN: [id number]   Date of Service:  2025    Admitting Diagnosis:  Acute left-sided weakness  Current Admission Summary: Cely Ochoa is a 39 y.o. female with pmh of asthma, obesity, seizures who presents with shortness of breath and seizures.  Patient initially presented to the emergency room for shortness of breath and seizures.  Her spouse is at bedside and states since Thursday she has had some upper respiratory symptoms.  She denies any fever.  She had a coughing episode at home and felt very short of breath so they called EMS.  Patient then vomited and had a seizure.  Wife reports tonic clonic seizure.  She had 1st seizure at 5:30pm.  She then proceeded to have 3 additional seizures prior to getting the emergency room.  And 1 seizure in the emergency room that was unwitnessed by nurses..  Patient was postictal on presentation to ER.  When she started wake up spouse noticed left facial droop with left arm and leg weakness.  Wife does states she has had increasing seizures over the last year last one was in February.  She does tend to have seizures when she is ill.   She is currently not established with neurology but takes Keppra 1gm BID.   Stroke team was called  she was not a TNK candidate secondary to seizure activity.   Past Medical History:  has a past medical history of Anxiety, Asthma, Cerebrovascular disease, Depression, Left foot drop, Meningitis, Seizures (HCC), and Stroke (HCC).  Past Surgical History:  has a past surgical history that includes Tonsillectomy; skin biopsy; and Tooth Extraction (2019).    Discharge Recommendations: Cely Ochoa scored a 15/24 on the AM-PAC ADL Inpatient form. Current research shows that an AM-PAC score of 17 or less is  78

## 2025-05-08 NOTE — CARE COORDINATION
Prior Authorization submitted for ARU approval with a reference number: [id number]     ARU will continue to follow progress and update discharge plan as needed.    HELENA GriffithsN, .[phone]

## 2025-05-08 NOTE — PROGRESS NOTES
C/o pain at the IV site when flushed. No signs of redness or swelling. [facility] called at 10 pm to place a new peripheral IV access.       A dose of Guaifenesin is due at 3 am. Patient request not to be disturbed at this time if she is sleeping.

## 2025-05-08 NOTE — CARE COORDINATION
Discharge Planning:    Pt's request for ARU was denied, CM will provide SNF list to pt.    Update: CM updated pt and family that ARU was denied by insurance and that pt can pick from SNF.   Family was declining SNF, but CM provided a list and informed some facilities are good for SNF. Pt and family report they will look at it and let CM know. CM provided pt with phone number and informed can leave voicemail with options.    Update: CM received a call from pt's spouse Bonnie and she was inquiring about doing an appeal for ARU and also wanting for the pt to transfer to  to get second opinion. CM informed the provider can initiate the transfer to  and that she can call the insurance and do an appeal, Bonnie agreed.    Update: Pt's spouse is calling the insurance and was told the insurance hasn't reviewed it. BARBIE LVM for Erlinda Ambrosio CM manager about pt's request to go to ARU and why P2P was not done.    Electronically signed by Hayes Cruz on 5/8/2025 at 12:21 PM  Electronically signed by Hayes Cruz on 5/8/2025 at 12:52 PM  Electronically signed by Hayes Cruz on 5/8/2025 at 3:29 PM  Electronically signed by Hayes Cruz on 5/8/2025 at 4:19 PM

## 2025-05-08 NOTE — PLAN OF CARE
Problem: Safety - Adult  Goal: Free from fall injury  5/8/2025 1350 by Denis Harper RN  Outcome: Progressing  Flowsheets (Taken 5/8/2025 1350)  Free From Fall Injury: Instruct family/caregiver on patient safety     Problem: Chronic Conditions and Co-morbidities  Goal: Patient's chronic conditions and co-morbidity symptoms are monitored and maintained or improved  5/8/2025 1350 by Denis Harper RN  Outcome: Progressing  Flowsheets (Taken 5/8/2025 1350)  Care Plan - Patient's Chronic Conditions and Co-Morbidity Symptoms are Monitored and Maintained or Improved: Monitor and assess patient's chronic conditions and comorbid symptoms for stability, deterioration, or improvement     Problem: Pain  Goal: Verbalizes/displays adequate comfort level or baseline comfort level  5/8/2025 1350 by Denis Harper RN  Outcome: Progressing  Flowsheets (Taken 5/8/2025 1350)  Verbalizes/displays adequate comfort level or baseline comfort level:   Encourage patient to monitor pain and request assistance   Assess pain using appropriate pain scale   Implement non-pharmacological measures as appropriate and evaluate response   Consider cultural and social influences on pain and pain management

## 2025-05-08 NOTE — PROGRESS NOTES
[facility] - Inpatient Rehabilitation Department   Phone: [phone]    Physical Therapy    [] Initial Evaluation            [x] Daily Treatment Note         [] Discharge Summary      Patient: Cely Ochoa   : 1985   MRN: [id number]   Date of Service:  2025  Admitting Diagnosis: Acute left-sided weakness  Current Admission Summary:  Cely Ochoa is a 39 y.o. female with pmh of asthma, obesity, seizures who presents with shortness of breath and seizures.  Patient initially presented to the emergency room for shortness of breath and seizures.  Her spouse is at bedside and states since Thursday she has had some upper respiratory symptoms.  She denies any fever.  She had a coughing episode at home and felt very short of breath so they called EMS.  Patient then vomited and had a seizure.  Wife reports tonic clonic seizure.  She had 1st seizure at 5:30pm.  She then proceeded to have 3 additional seizures prior to getting the emergency room.  And 1 seizure in the emergency room that was unwitnessed by nurses..  Patient was postictal on presentation to ER.  When she started wake up spouse noticed left facial droop with left arm and leg weakness.  Wife does states she has had increasing seizures over the last year last one was in February.  She does tend to have seizures when she is ill.   She is currently not established with neurology but takes Keppra 1gm BID.   Stroke team was called  she was not a TNK candidate secondary to seizure activity.   Past Medical History:  has a past medical history of Anxiety, Asthma, Cerebrovascular disease, Depression, Left foot drop, Meningitis, Seizures (HCC), and Stroke (HCC).  Past Surgical History:  has a past surgical history that includes Tonsillectomy; skin biopsy; and Tooth Extraction (2019).    Discharge Recommendations: Cely Ochoa scored a  on the AM-PAC short mobility form. Current research shows that an AM-PAC score of 17 or less is typically

## 2025-05-08 NOTE — PROGRESS NOTES
V2.0    [facility] Progress Note      Name:  Cely Ochoa /Age/Sex: 1985  (39 y.o. female)   MRN & CSN:  [id number] & [id number] Encounter Date/Time: 2025 1:05 PM EDT   Location:  [facility]3367/3367-01 PCP: Leigha Meneses DO     Attending:Renée Cosby MD       Hospital Day: 4    Assessment and Recommendations   Cely Ochoa is a 39 y.o. female with pmh of CVA, Asthma, obesity, seizures presented with shortness of breath, seizures and left sided weakness.      Acute left Sided Weakness and numbness: CVA ruled out  CT head negative.  CTA head and Neck showed no LVO.  MRI brain negative for acute abnormality.  Neuro checks every 4 hours  Continue ASA, Statin.  Neurology consult appreciated: Recommendations noted.  PT ,OT, and speech consult    Breakthrough Seizures:  EEG without epileptiform discharges.   Continue AEDs. Seizure precaution.    Human metapneumovirus infection with acute Asthma Exacerbation:   CT chest negative for acute abnormality.  Continue Steroid and Duonebs.   Wean off supplemental oxygen as tolerated.  Antitussives.    Iron deficiency anemia: Started on IV iron.     Obesity: Body mass index is 45.63 kg/m².   BMI Complicating assessment and treatment. Placing patient at risk for multiple co-morbidities as well as early death and contributing to the patient's presentation. Education, and counseling provided.     Diet ADULT DIET; Regular   DVT Prophylaxis [x] Lovenox, []  Heparin, [] SCDs, [] Ambulation,  [] Eliquis, [] Xarelto  [] Coumadin   Code Status Full Code   Disposition From: Home  Expected Disposition:   Estimated Date of Discharge: ARU/SNF  Patient requires continued admission due to Left sided weakness   Surrogate Decision Maker/ FARAZ  Jaun Ochoaa (Spouse)  [phone]     Personally reviewed Lab Studies and Imaging     Subjective:     Chief Complaint: : Seizure, left arm/leg weakness     Patient seen and examined.   c/o cough, wheezing, sore throat.  Stable left sided

## 2025-05-08 NOTE — PROGRESS NOTES
PIV was placed successfully. Awake and having SOB and coughing spell. Guaifenesin given at this time. RT notified and HHN in progress. Feeling better.

## 2025-05-08 NOTE — PROGRESS NOTES
Cely Gabriela  Neurology Follow-up  [facility] Neurology    Date of Service: 5/8/2025    Subjective:   CC: Follow up today regarding: Acute left-sided weakness, breakthrough seizure    Events noted. Chart and lab reviewed.  Patient seen this morning family is at bedside.  Reports incremental improvement in left-sided weakness.  She reports she was able to stand and pivot and use the bedside commode today and while in the bathroom she was able to use the left hand to put toothpaste on her toothbrush.  No other complaints today.      ROS : A 10-12 system review obtained and updated today and is unremarkable except as mentioned  in my interval history.     Past medical history, social history, medication and family history reviewed.       Objective:  Exam:   Constitutional:   Vitals:    05/08/25 0807 05/08/25 0934 05/08/25 1025 05/08/25 1210   BP:    (!) 147/84   Pulse:  86  84   Resp:  18 17    Temp:    97.8 °F (36.6 °C)   TempSrc:    Tympanic   SpO2:  94%  95%   Weight: (!) 144.2 kg (318 lb)      Height:         Significant change in exam today.  General appearance:  Normal development and appear in no acute distress.   Mental Status:   Oriented to person, place, problem, and time.    Memory: Good immediate recall.  Intact remote memory  Normal attention span and concentration.  Language: intact naming, repeating and fluency   Good fund of Knowledge.   Cranial Nerves:   II:   Pupils: equal, round, reactive to light  III,IV,VI: Extra Ocular Movements are intact. No nystagmus  V: Facial sensation is intact  VII: Facial strength and movements: intact and symmetric  XII: Tongue movements are normal  Musculoskeletal: 5/5 right side extremities.  Same 3/5 left side extremities with inconsistent features  Tone: Normal tone.   Reflexes: Symmetric 2+ in both arms and legs.  Coordination: no pronator drift, no dysmetria with FNF  Sensation: normal.  Gait/Posture: Deferred    MDM:      A. Problems (any

## 2025-05-09 PROCEDURE — 6370000000 HC RX 637 (ALT 250 FOR IP): Performed by: INTERNAL MEDICINE

## 2025-05-09 PROCEDURE — 94640 AIRWAY INHALATION TREATMENT: CPT

## 2025-05-09 PROCEDURE — 97530 THERAPEUTIC ACTIVITIES: CPT

## 2025-05-09 PROCEDURE — 2500000003 HC RX 250 WO HCPCS: Performed by: NURSE PRACTITIONER

## 2025-05-09 PROCEDURE — 94761 N-INVAS EAR/PLS OXIMETRY MLT: CPT

## 2025-05-09 PROCEDURE — 2700000000 HC OXYGEN THERAPY PER DAY

## 2025-05-09 PROCEDURE — 6360000002 HC RX W HCPCS: Performed by: NURSE PRACTITIONER

## 2025-05-09 PROCEDURE — 97535 SELF CARE MNGMENT TRAINING: CPT

## 2025-05-09 PROCEDURE — 6360000002 HC RX W HCPCS: Performed by: INTERNAL MEDICINE

## 2025-05-09 PROCEDURE — 2580000003 HC RX 258: Performed by: INTERNAL MEDICINE

## 2025-05-09 PROCEDURE — 92526 ORAL FUNCTION THERAPY: CPT

## 2025-05-09 PROCEDURE — 6370000000 HC RX 637 (ALT 250 FOR IP): Performed by: NURSE PRACTITIONER

## 2025-05-09 PROCEDURE — 2500000003 HC RX 250 WO HCPCS: Performed by: INTERNAL MEDICINE

## 2025-05-09 PROCEDURE — 2060000000 HC ICU INTERMEDIATE R&B

## 2025-05-09 RX ORDER — IPRATROPIUM BROMIDE AND ALBUTEROL SULFATE 2.5; .5 MG/3ML; MG/3ML
1 SOLUTION RESPIRATORY (INHALATION)
Status: DISCONTINUED | OUTPATIENT
Start: 2025-05-09 | End: 2025-05-13 | Stop reason: HOSPADM

## 2025-05-09 RX ORDER — PREDNISONE 20 MG/1
40 TABLET ORAL DAILY
Status: DISCONTINUED | OUTPATIENT
Start: 2025-05-10 | End: 2025-05-10

## 2025-05-09 RX ORDER — IPRATROPIUM BROMIDE AND ALBUTEROL SULFATE 2.5; .5 MG/3ML; MG/3ML
1 SOLUTION RESPIRATORY (INHALATION)
Status: DISCONTINUED | OUTPATIENT
Start: 2025-05-09 | End: 2025-05-09

## 2025-05-09 RX ADMIN — Medication 10 ML: at 14:40

## 2025-05-09 RX ADMIN — BENZONATATE 200 MG: 100 CAPSULE ORAL at 21:41

## 2025-05-09 RX ADMIN — BENZONATATE 200 MG: 100 CAPSULE ORAL at 12:30

## 2025-05-09 RX ADMIN — Medication 10 ML: at 05:16

## 2025-05-09 RX ADMIN — Medication 10 ML: at 22:43

## 2025-05-09 RX ADMIN — Medication 10 ML: at 09:35

## 2025-05-09 RX ADMIN — IPRATROPIUM BROMIDE AND ALBUTEROL SULFATE 1 DOSE: .5; 3 SOLUTION RESPIRATORY (INHALATION) at 10:42

## 2025-05-09 RX ADMIN — BENZONATATE 200 MG: 100 CAPSULE ORAL at 09:35

## 2025-05-09 RX ADMIN — OXYCODONE AND ACETAMINOPHEN 1 TABLET: 325; 5 TABLET ORAL at 21:41

## 2025-05-09 RX ADMIN — CIPROFLOXACIN HYDROCHLORIDE AND HYDROCORTISONE 4 DROP: 2; 10 SUSPENSION/ DROPS AURICULAR (OTIC) at 09:35

## 2025-05-09 RX ADMIN — OXYCODONE AND ACETAMINOPHEN 1 TABLET: 325; 5 TABLET ORAL at 14:40

## 2025-05-09 RX ADMIN — ENOXAPARIN SODIUM 30 MG: 100 INJECTION SUBCUTANEOUS at 09:35

## 2025-05-09 RX ADMIN — IPRATROPIUM BROMIDE AND ALBUTEROL SULFATE 1 DOSE: .5; 3 SOLUTION RESPIRATORY (INHALATION) at 19:39

## 2025-05-09 RX ADMIN — METHYLPREDNISOLONE SODIUM SUCCINATE 40 MG: 40 INJECTION INTRAMUSCULAR; INTRAVENOUS at 21:30

## 2025-05-09 RX ADMIN — ASPIRIN 81 MG: 81 TABLET, CHEWABLE ORAL at 09:35

## 2025-05-09 RX ADMIN — GABAPENTIN 600 MG: 300 CAPSULE ORAL at 09:35

## 2025-05-09 RX ADMIN — PANTOPRAZOLE SODIUM 40 MG: 40 TABLET, DELAYED RELEASE ORAL at 06:35

## 2025-05-09 RX ADMIN — SERTRALINE 100 MG: 50 TABLET, FILM COATED ORAL at 09:35

## 2025-05-09 RX ADMIN — CIPROFLOXACIN HYDROCHLORIDE AND HYDROCORTISONE 4 DROP: 2; 10 SUSPENSION/ DROPS AURICULAR (OTIC) at 21:43

## 2025-05-09 RX ADMIN — ENOXAPARIN SODIUM 30 MG: 100 INJECTION SUBCUTANEOUS at 21:30

## 2025-05-09 RX ADMIN — SODIUM CHLORIDE 125 MG: 9 INJECTION, SOLUTION INTRAVENOUS at 13:10

## 2025-05-09 RX ADMIN — WATER 40 MG: 1 INJECTION INTRAMUSCULAR; INTRAVENOUS; SUBCUTANEOUS at 05:16

## 2025-05-09 RX ADMIN — GABAPENTIN 1200 MG: 400 CAPSULE ORAL at 21:30

## 2025-05-09 RX ADMIN — LEVETIRACETAM 1000 MG: 100 INJECTION INTRAVENOUS at 06:32

## 2025-05-09 RX ADMIN — SODIUM CHLORIDE, PRESERVATIVE FREE 10 ML: 5 INJECTION INTRAVENOUS at 09:35

## 2025-05-09 RX ADMIN — LEVETIRACETAM 1000 MG: 100 INJECTION INTRAVENOUS at 17:40

## 2025-05-09 RX ADMIN — SODIUM CHLORIDE, PRESERVATIVE FREE 10 ML: 5 INJECTION INTRAVENOUS at 21:42

## 2025-05-09 RX ADMIN — GABAPENTIN 900 MG: 300 CAPSULE ORAL at 12:30

## 2025-05-09 ASSESSMENT — PAIN SCALES - GENERAL
PAINLEVEL_OUTOF10: 6
PAINLEVEL_OUTOF10: 4
PAINLEVEL_OUTOF10: 7
PAINLEVEL_OUTOF10: 5

## 2025-05-09 ASSESSMENT — PAIN DESCRIPTION - LOCATION
LOCATION: GENERALIZED;CHEST
LOCATION: CHEST;RIB CAGE
LOCATION: GENERALIZED

## 2025-05-09 NOTE — PROGRESS NOTES
Speech Language Pathology  [facility] - Inpatient Rehabilitation Services  [phone]  SLP Dysphagia Treatment       Patient: Cely Ochoa   : 1985   MRN: [id number]      Evaluation Date: 2025      Admitting Dx: Facial droop [R29.810]  Acute left-sided weakness [R53.1]  Seizure-like activity (HCC) [R56.9]  Treatment Diagnosis: Oropharyngeal Dysphagia   Pain: Did not state                                  Recommendations      Recommended Diet and Intervention 2025:  Diet Solids Recommendation:  Regular texture diet  Liquid Consistency Recommendation:  Thin liquids  Recommended form of Meds: Meds whole with water or Meds in puree      Compensatory strategies: Aspiration Precautions , Alternate solids/liquids , Eat or Feed Slowly, Lingual Sweep , Small Bites and Sips , Upright as possible with all PO intake     Discharge Recommendations:  Discharge recommendations to be determined pending ongoing follow-up during acute care stay    History/Course of Treatment     H&P: Cely Ochoa is a 39 y.o. female with pmh of asthma, obesity, seizures who presents with shortness of breath and seizures.  Patient initially presented to the emergency room for shortness of breath and seizures.  Her spouse is at bedside and states since Thursday she has had some upper respiratory symptoms.  She denies any fever.  She had a coughing episode at home and felt very short of breath so they called EMS.  Patient then vomited and had a seizure.  Wife reports tonic clonic seizure.  She had 1st seizure at 5:30pm.  She then proceeded to have 3 additional seizures prior to getting the emergency room.  And 1 seizure in the emergency room that was unwitnessed by nurses..  Patient was postictal on presentation to ER.  When she started wake up spouse noticed left facial droop with left arm and leg weakness.  Wife does states she has had increasing seizures over the last year last one was in February.  She does tend to have

## 2025-05-09 NOTE — PROGRESS NOTES
[facility] - Inpatient Rehabilitation Department   Phone: [phone]    Physical Therapy    [] Initial Evaluation            [x] Daily Treatment Note         [] Discharge Summary      Patient: Cely Ochoa   : 1985   MRN: [id number]   Date of Service:  2025  Admitting Diagnosis: Acute left-sided weakness  Current Admission Summary:  Cely Ochoa is a 39 y.o. female with pmh of asthma, obesity, seizures who presents with shortness of breath and seizures.  Patient initially presented to the emergency room for shortness of breath and seizures.  Her spouse is at bedside and states since Thursday she has had some upper respiratory symptoms.  She denies any fever.  She had a coughing episode at home and felt very short of breath so they called EMS.  Patient then vomited and had a seizure.  Wife reports tonic clonic seizure.  She had 1st seizure at 5:30pm.  She then proceeded to have 3 additional seizures prior to getting the emergency room.  And 1 seizure in the emergency room that was unwitnessed by nurses..  Patient was postictal on presentation to ER.  When she started wake up spouse noticed left facial droop with left arm and leg weakness.  Wife does states she has had increasing seizures over the last year last one was in February.  She does tend to have seizures when she is ill.   She is currently not established with neurology but takes Keppra 1gm BID.   Stroke team was called  she was not a TNK candidate secondary to seizure activity.   Past Medical History:  has a past medical history of Anxiety, Asthma, Cerebrovascular disease, Depression, Left foot drop, Meningitis, Seizures (HCC), and Stroke (HCC).  Past Surgical History:  has a past surgical history that includes Tonsillectomy; skin biopsy; and Tooth Extraction (2019).    Discharge Recommendations: Cely Ochoa scored a  on the AM-PAC short mobility form. Current research shows that an AM-PAC score of 17 or less is typically

## 2025-05-09 NOTE — PROGRESS NOTES
05/09/25 1038   RT Protocol   History Pulmonary Disease 0   Respiratory pattern 2   Breath sounds 2   Cough 1   Bronchodilator Assessment Score 5

## 2025-05-09 NOTE — PROGRESS NOTES
V2.0    [facility] Progress Note      Name:  Cely Ochoa /Age/Sex: 1985  (39 y.o. female)   MRN & CSN:  [id number] & [id number] Encounter Date/Time: 2025 1:05 PM EDT   Location:  [id number] PCP: Leigha Meneses DO     Attending:Renée Cosby MD       Hospital Day: 5    Assessment and Recommendations   Cely Ochoa is a 39 y.o. female with pmh of CVA, Asthma, obesity, seizures presented with shortness of breath, seizures and left sided weakness.      Acute left Sided Weakness and numbness: CVA ruled out  CT head negative.  CTA head and Neck showed no LVO.  MRI brain negative for acute abnormality.  Neuro checks every 4 hours  Continue ASA, Statin.  Neurology consult appreciated: Likely functional neurological deficit.  No further testing at this time but follow-up with outpatient neurologist as scheduled.  PT,OT recommending ARU which was denied.   Patient refusing SNF and requesting transfer to  if she cannot be discharged to ARU since home with home care is not an option for her due to having multiple stairs at home.    Psychogenic Non-Epileptic Events:  cvEEG  normal.  EEG without epileptiform discharges.   Continue AEDs. Seizure precaution.  Do not resume tramadol on discharge.      Human metapneumovirus infection with acute Asthma Exacerbation:   CT chest negative for acute abnormality.  Continue Steroids and Duonebs.   Wean off supplemental oxygen as tolerated.  Antitussives.    Iron deficiency anemia: Started on IV iron.     Obesity: Body mass index is 44.18 kg/m².   BMI Complicating assessment and treatment. Placing patient at risk for multiple co-morbidities as well as early death and contributing to the patient's presentation. Education, and counseling provided.     Diet ADULT DIET; Regular  ADULT ORAL NUTRITION SUPPLEMENT; Breakfast, Lunch, Dinner; Standard High Calorie/High Protein Oral Supplement   DVT Prophylaxis [x] Lovenox, []  Heparin, [] SCDs, [] Ambulation,  [] Eliquis,

## 2025-05-09 NOTE — PLAN OF CARE
Problem: Safety - Adult  Goal: Free from fall injury  5/8/2025 2322 by Sindy Leyva RN  Outcome: Progressing     Problem: Discharge Planning  Goal: Discharge to home or other facility with appropriate resources  Outcome: Progressing     Problem: Pain  Goal: Verbalizes/displays adequate comfort level or baseline comfort level  5/8/2025 2322 by Sindy Leyva RN  Outcome: Progressing     Problem: Skin/Tissue Integrity  Goal: Skin integrity remains intact  Description: 1.  Monitor for areas of redness and/or skin breakdown2.  Assess vascular access sites hourly3.  Every 4-6 hours minimum:  Change oxygen saturation probe site4.  Every 4-6 hours:  If on nasal continuous positive airway pressure, respiratory therapy assess nares and determine need for appliance change or resting period  Outcome: Progressing

## 2025-05-09 NOTE — CARE COORDINATION
Discharge Planning:    Pt and family are appealing the ARU denial with the insurance.  Pt and family wanted to transfer to .  Attending provider tried the hospital to hospital transfer. Provider informed CM that [facility] denied the transfer as they would not do things differently and that pt can follow up outpatient.  CM updated the nurse.    When medically ready, pt will need to either go to SNF or go home with C.    Electronically signed by Hayes Cruz on 5/9/2025 at 3:06 PM

## 2025-05-09 NOTE — PROGRESS NOTES
device  Ambulation Assistance:Independent without use of device  ADL Assistance: independent with all ADL's  IADL Assistance: independent with homemaking tasks  Active :        [] Yes                 [x] No  Hand Dominance: [x] Left                 [x] Right  Current Employment: disabled  Hobbies: watch tv, play with grandson, rayne hair  Recent Falls: 4 falls in past 6 months, fell on step stool, fell getting into shower, (L) leg gives out on her at times  Available Assistance at Discharge: 24 hr supervision (non-physical) available-continue to assess    Examination   Vision:   Vision Gross Assessment: Impaired and Vision Corrective Device: wears glasses at all times, reports increased blurriness in B eyes since admission  Hearing:   WFL  Perception:   Unilateral Attention: cues to attend (L) side of body  Observation:   General Observation:  telemetry, pt on RA  Posture:   Flexed trunk   Sensation:   reports numbness and tingling in (L) UE, (L) LE, reports this is chronic, feels like her arm is \"asleep\"   Proprioception:    diminished proprioception in (L) UE, (L) LE  Tone:   Normotonic  Coordination Testing:   Coordination and Movement Description: (L) UE, (L) LE, fine motor impairments, gross motor impairments, decreased speed, decreased accuracy  Alternating Pronation/Supination: Impaired on Left  Finger/Thumb Opposition: Impaired on Left    ROM:   (L) Shoulder: AROM 0-40     (R) Shoulder: WFL  (L) Elbow: 0-30     (R) Elbow: WFL  (L) Wrist: 0-20     (R) Wrist: WFL  (L) Hand: decreased gross grasp noted     (R) Hand: WFL  Strength:   (L) Shoulder: -2     (R) Shoulder: +3  (L) Elbow: -2     (R) Elbow: +3  (L) Wrist: -2     (R) Wrist: +3    Therapist Clinical Decision Making (Complexity): medium complexity  Clinical Presentation: evolving      Subjective: Pt on 2L O2 supine in bed upon entry. Pt pleasant and agreeable to therapy session.   General: Pt's wife present.  Pain: 4/10.  Location: \"all over\"  Pain  Interventions: pain medication in place prior to arrival, repositioned , and therapy activities modified        Activities of Daily Living  Basic Activities of Daily Living  Feeding: setup assistance  Feeding Comments: assist to cut up pt's waffle and apply peanut butter  Grooming: contact guard assistance  Grooming Comments: pt washed face/oral care in stance at [facility]  Upper Extremity Bathing: stand by assistance contact guard assistance  Lower Extremity Dressing: stand by assistance  Dressing Equipment: none  Dressing Comments: Pt threaded BLEs into shorts seated in recliner with fig 4 and needed assist to pull up in stance. Pt doffed  socks, donned personal socks/high top sneakers. Pt used LUE to assist and stabilize tying sneakers  Comments: Pt washed UB at sink both seated on paddles and standing in [facility]  Instrumental Activities of Daily Living  No IADL completed on this date.    Functional Mobility  Bed Mobility:  Sit to Supine: stand by assistance  Rolling Right: stand by assistance  Scooting: stand by assistance  Comments: bed flat  Transfers:  Sit to stand transfer:contact guard assistance  Stand to sit transfer: contact guard assistance  Bed / Chair transfer: stedy utilized requiring CGA .    Bed / Chair equipment: no device  Bed / Chair comments: [facility] EOB to recliner  Comments: Sit to stands both to  and [facility]. Pt CGA to advance LLE forwards/backwards x 3 trials. Pt with weight shifting in stance to put weight on LLE    Functional Mobility  No functional mobility completed on this date secondary to unsafe to attempt this date due to pt with trunk writhing in stance at .  Balance:  Static Sitting Balance: good: independent with functional balance in unsupported position  Dynamic Sitting Balance: fair (+): maintains balance at SBA/supervision without use of UE support  Static Standing Balance: fair (-): maintains balance at CGA with use of UE support  Dynamic Standing Balance:

## 2025-05-10 ENCOUNTER — APPOINTMENT (OUTPATIENT)
Dept: GENERAL RADIOLOGY | Age: 40
DRG: 101 | End: 2025-05-10
Payer: MEDICARE

## 2025-05-10 LAB
ANION GAP SERPL CALCULATED.3IONS-SCNC: 12 MMOL/L (ref 3–16)
BASOPHILS # BLD: 0.1 K/UL (ref 0–0.2)
BASOPHILS NFR BLD: 1 %
BUN SERPL-MCNC: 14 MG/DL (ref 7–20)
CALCIUM SERPL-MCNC: 9.2 MG/DL (ref 8.3–10.6)
CHLORIDE SERPL-SCNC: 104 MMOL/L (ref 99–110)
CO2 SERPL-SCNC: 22 MMOL/L (ref 21–32)
CREAT SERPL-MCNC: 0.7 MG/DL (ref 0.6–1.1)
DEPRECATED RDW RBC AUTO: 16.6 % (ref 12.4–15.4)
EOSINOPHIL # BLD: 0 K/UL (ref 0–0.6)
EOSINOPHIL NFR BLD: 0.1 %
GFR SERPLBLD CREATININE-BSD FMLA CKD-EPI: >90 ML/MIN/{1.73_M2}
GLUCOSE SERPL-MCNC: 105 MG/DL (ref 70–99)
HCT VFR BLD AUTO: 41.2 % (ref 36–48)
HGB BLD-MCNC: 13.3 G/DL (ref 12–16)
LYMPHOCYTES # BLD: 2 K/UL (ref 1–5.1)
LYMPHOCYTES NFR BLD: 14.6 %
MCH RBC QN AUTO: 26.1 PG (ref 26–34)
MCHC RBC AUTO-ENTMCNC: 32.3 G/DL (ref 31–36)
MCV RBC AUTO: 80.7 FL (ref 80–100)
MONOCYTES # BLD: 1.5 K/UL (ref 0–1.3)
MONOCYTES NFR BLD: 10.7 %
NEUTROPHILS # BLD: 10.1 K/UL (ref 1.7–7.7)
NEUTROPHILS NFR BLD: 73.6 %
PLATELET # BLD AUTO: 223 K/UL (ref 135–450)
PMV BLD AUTO: 7.6 FL (ref 5–10.5)
POTASSIUM SERPL-SCNC: 4.3 MMOL/L (ref 3.5–5.1)
RBC # BLD AUTO: 5.1 M/UL (ref 4–5.2)
SODIUM SERPL-SCNC: 138 MMOL/L (ref 136–145)
WBC # BLD AUTO: 13.7 K/UL (ref 4–11)

## 2025-05-10 PROCEDURE — 85025 COMPLETE CBC W/AUTO DIFF WBC: CPT

## 2025-05-10 PROCEDURE — 6370000000 HC RX 637 (ALT 250 FOR IP): Performed by: INTERNAL MEDICINE

## 2025-05-10 PROCEDURE — 2500000003 HC RX 250 WO HCPCS: Performed by: INTERNAL MEDICINE

## 2025-05-10 PROCEDURE — 71046 X-RAY EXAM CHEST 2 VIEWS: CPT

## 2025-05-10 PROCEDURE — 6360000002 HC RX W HCPCS: Performed by: INTERNAL MEDICINE

## 2025-05-10 PROCEDURE — 2060000000 HC ICU INTERMEDIATE R&B

## 2025-05-10 PROCEDURE — 2700000000 HC OXYGEN THERAPY PER DAY

## 2025-05-10 PROCEDURE — 80048 BASIC METABOLIC PNL TOTAL CA: CPT

## 2025-05-10 PROCEDURE — 6360000002 HC RX W HCPCS: Performed by: NURSE PRACTITIONER

## 2025-05-10 PROCEDURE — 94640 AIRWAY INHALATION TREATMENT: CPT

## 2025-05-10 PROCEDURE — 94761 N-INVAS EAR/PLS OXIMETRY MLT: CPT

## 2025-05-10 PROCEDURE — 6370000000 HC RX 637 (ALT 250 FOR IP): Performed by: NURSE PRACTITIONER

## 2025-05-10 PROCEDURE — 36415 COLL VENOUS BLD VENIPUNCTURE: CPT

## 2025-05-10 PROCEDURE — 2580000003 HC RX 258: Performed by: INTERNAL MEDICINE

## 2025-05-10 PROCEDURE — 2500000003 HC RX 250 WO HCPCS: Performed by: NURSE PRACTITIONER

## 2025-05-10 RX ORDER — AZITHROMYCIN 250 MG/1
250 TABLET, FILM COATED ORAL DAILY
Status: DISCONTINUED | OUTPATIENT
Start: 2025-05-11 | End: 2025-05-13 | Stop reason: HOSPADM

## 2025-05-10 RX ORDER — AZITHROMYCIN 250 MG/1
500 TABLET, FILM COATED ORAL DAILY
Status: COMPLETED | OUTPATIENT
Start: 2025-05-10 | End: 2025-05-10

## 2025-05-10 RX ADMIN — PREDNISONE 40 MG: 20 TABLET ORAL at 09:00

## 2025-05-10 RX ADMIN — WATER 40 MG: 1 INJECTION INTRAMUSCULAR; INTRAVENOUS; SUBCUTANEOUS at 13:45

## 2025-05-10 RX ADMIN — SODIUM CHLORIDE, PRESERVATIVE FREE 10 ML: 5 INJECTION INTRAVENOUS at 09:01

## 2025-05-10 RX ADMIN — LEVETIRACETAM 1000 MG: 100 INJECTION INTRAVENOUS at 18:10

## 2025-05-10 RX ADMIN — Medication 10 ML: at 14:56

## 2025-05-10 RX ADMIN — Medication 10 ML: at 21:16

## 2025-05-10 RX ADMIN — ALBUTEROL SULFATE 2.5 MG: 2.5 SOLUTION RESPIRATORY (INHALATION) at 15:41

## 2025-05-10 RX ADMIN — CIPROFLOXACIN HYDROCHLORIDE AND HYDROCORTISONE 4 DROP: 2; 10 SUSPENSION/ DROPS AURICULAR (OTIC) at 21:15

## 2025-05-10 RX ADMIN — BENZONATATE 200 MG: 100 CAPSULE ORAL at 09:00

## 2025-05-10 RX ADMIN — GABAPENTIN 900 MG: 300 CAPSULE ORAL at 12:15

## 2025-05-10 RX ADMIN — LEVETIRACETAM 1000 MG: 100 INJECTION INTRAVENOUS at 06:30

## 2025-05-10 RX ADMIN — ENOXAPARIN SODIUM 30 MG: 100 INJECTION SUBCUTANEOUS at 21:13

## 2025-05-10 RX ADMIN — IPRATROPIUM BROMIDE AND ALBUTEROL SULFATE 1 DOSE: .5; 3 SOLUTION RESPIRATORY (INHALATION) at 22:05

## 2025-05-10 RX ADMIN — SERTRALINE 100 MG: 50 TABLET, FILM COATED ORAL at 09:00

## 2025-05-10 RX ADMIN — ASPIRIN 81 MG: 81 TABLET, CHEWABLE ORAL at 09:00

## 2025-05-10 RX ADMIN — POLYETHYLENE GLYCOL 3350 17 G: 17 POWDER, FOR SOLUTION ORAL at 09:04

## 2025-05-10 RX ADMIN — AZITHROMYCIN DIHYDRATE 500 MG: 250 TABLET ORAL at 12:15

## 2025-05-10 RX ADMIN — SODIUM CHLORIDE, PRESERVATIVE FREE 10 ML: 5 INJECTION INTRAVENOUS at 21:15

## 2025-05-10 RX ADMIN — Medication 10 ML: at 09:00

## 2025-05-10 RX ADMIN — CIPROFLOXACIN HYDROCHLORIDE AND HYDROCORTISONE 4 DROP: 2; 10 SUSPENSION/ DROPS AURICULAR (OTIC) at 09:02

## 2025-05-10 RX ADMIN — GABAPENTIN 600 MG: 300 CAPSULE ORAL at 09:00

## 2025-05-10 RX ADMIN — WATER 40 MG: 1 INJECTION INTRAMUSCULAR; INTRAVENOUS; SUBCUTANEOUS at 21:14

## 2025-05-10 RX ADMIN — BUTALBITAL, ACETAMINOPHEN, AND CAFFEINE 1 TABLET: 325; 50; 40 TABLET ORAL at 16:47

## 2025-05-10 RX ADMIN — BENZONATATE 200 MG: 100 CAPSULE ORAL at 13:46

## 2025-05-10 RX ADMIN — BENZONATATE 200 MG: 100 CAPSULE ORAL at 21:14

## 2025-05-10 RX ADMIN — IPRATROPIUM BROMIDE AND ALBUTEROL SULFATE 1 DOSE: .5; 3 SOLUTION RESPIRATORY (INHALATION) at 10:49

## 2025-05-10 RX ADMIN — OXYCODONE AND ACETAMINOPHEN 1 TABLET: 325; 5 TABLET ORAL at 21:15

## 2025-05-10 RX ADMIN — ENOXAPARIN SODIUM 30 MG: 100 INJECTION SUBCUTANEOUS at 09:02

## 2025-05-10 RX ADMIN — SODIUM CHLORIDE 125 MG: 9 INJECTION, SOLUTION INTRAVENOUS at 13:53

## 2025-05-10 RX ADMIN — OXYCODONE AND ACETAMINOPHEN 1 TABLET: 325; 5 TABLET ORAL at 12:15

## 2025-05-10 RX ADMIN — Medication 10 ML: at 04:19

## 2025-05-10 RX ADMIN — GABAPENTIN 1200 MG: 400 CAPSULE ORAL at 21:14

## 2025-05-10 RX ADMIN — PANTOPRAZOLE SODIUM 40 MG: 40 TABLET, DELAYED RELEASE ORAL at 06:31

## 2025-05-10 ASSESSMENT — PAIN DESCRIPTION - LOCATION
LOCATION: ABDOMEN
LOCATION: HEAD

## 2025-05-10 ASSESSMENT — PAIN SCALES - GENERAL
PAINLEVEL_OUTOF10: 7
PAINLEVEL_OUTOF10: 7
PAINLEVEL_OUTOF10: 10

## 2025-05-10 ASSESSMENT — PAIN DESCRIPTION - ORIENTATION
ORIENTATION: RIGHT;LEFT
ORIENTATION: RIGHT

## 2025-05-10 ASSESSMENT — PAIN DESCRIPTION - DESCRIPTORS
DESCRIPTORS: ACHING
DESCRIPTORS: ACHING

## 2025-05-10 NOTE — PLAN OF CARE
Problem: Safety - Adult  Goal: Free from fall injury  Outcome: Progressing     Problem: Discharge Planning  Goal: Discharge to home or other facility with appropriate resources  Outcome: Progressing     Problem: Pain  Goal: Verbalizes/displays adequate comfort level or baseline comfort level  Outcome: Progressing     Problem: Skin/Tissue Integrity  Goal: Skin integrity remains intact  Description: 1.  Monitor for areas of redness and/or skin breakdown2.  Assess vascular access sites hourly3.  Every 4-6 hours minimum:  Change oxygen saturation probe site4.  Every 4-6 hours:  If on nasal continuous positive airway pressure, respiratory therapy assess nares and determine need for appliance change or resting period  Outcome: Progressing     Problem: Respiratory - Adult  Goal: Achieves optimal ventilation and oxygenation  Outcome: Progressing

## 2025-05-10 NOTE — PLAN OF CARE
Problem: Safety - Adult  Goal: Free from fall injury  5/10/2025 1044 by Shila Baker RN  Outcome: Progressing  5/9/2025 2205 by Sindy Leyva RN  Outcome: Progressing     Problem: Chronic Conditions and Co-morbidities  Goal: Patient's chronic conditions and co-morbidity symptoms are monitored and maintained or improved  Outcome: Progressing  Flowsheets (Taken 5/10/2025 0849)  Care Plan - Patient's Chronic Conditions and Co-Morbidity Symptoms are Monitored and Maintained or Improved: Monitor and assess patient's chronic conditions and comorbid symptoms for stability, deterioration, or improvement     Problem: Discharge Planning  Goal: Discharge to home or other facility with appropriate resources  5/10/2025 1044 by Shila Baker RN  Outcome: Progressing  Flowsheets (Taken 5/10/2025 0849)  Discharge to home or other facility with appropriate resources: Identify barriers to discharge with patient and caregiver  5/9/2025 2205 by Sindy Leyva RN  Outcome: Progressing     Problem: Pain  Goal: Verbalizes/displays adequate comfort level or baseline comfort level  5/10/2025 1044 by Shila Baker RN  Outcome: Progressing  5/9/2025 2205 by Sindy Leyva RN  Outcome: Progressing     Problem: Skin/Tissue Integrity  Goal: Skin integrity remains intact  Description: 1.  Monitor for areas of redness and/or skin breakdown2.  Assess vascular access sites hourly3.  Every 4-6 hours minimum:  Change oxygen saturation probe site4.  Every 4-6 hours:  If on nasal continuous positive airway pressure, respiratory therapy assess nares and determine need for appliance change or resting period  5/10/2025 1044 by Shila Baker RN  Outcome: Progressing  5/9/2025 2205 by Sindy Leyva RN  Outcome: Progressing     Problem: Neurosensory - Adult  Goal: Achieves stable or improved neurological status  Outcome: Progressing  Goal: Achieves maximal functionality and self care  Outcome: Progressing

## 2025-05-10 NOTE — PROGRESS NOTES
V2.0    [facility] Progress Note      Name:  Cely Ochoa /Age/Sex: 1985  (39 y.o. female)   MRN & CSN:  [id number] & [id number] Encounter Date/Time: 5/10/2025 1:05 PM EDT   Location:  [facility]3367/3367-01 PCP: Leigha Meneses DO     Attending:Tadeo Hunter MD       Hospital Day: 6    Assessment and Recommendations   Cely Ochoa is a 39 y.o. female with pmh of CVA, Asthma, obesity, seizures presented with shortness of breath, seizures and left sided weakness.      Acute left Sided Weakness and numbness: CVA ruled out  CT head negative.  CTA head and Neck showed no LVO.  MRI brain negative for acute abnormality.  Continue ASA, Statin.  Neurology consult appreciated: Likely functional neurological deficit.  No further testing at this time but follow-up with outpatient neurologist as scheduled.  PT,OT recommending ARU which was denied.   I had a lengthy discussion with the patient at this point they have self appealed for ARU.  I recommended that the look at skilled nursing facilities patient and family pretty clear that they are not going to go with skilled nursing if he already gets denied the plan is to go home    Psychogenic Non-Epileptic Events:  cvEEG  normal.  EEG without epileptiform discharges.   Continue AEDs. Seizure precaution.  Do not resume tramadol on discharge.      Human metapneumovirus infection with acute Asthma Exacerbation:   CT chest negative for acute abnormality.  Continue Steroids and Duonebs.   Off oxygen still having cough  IV steroids every 8 hours  Will give a Z-Shawn  Antitussives.    Iron deficiency anemia: Started on IV iron.     Obesity: Body mass index is 44.18 kg/m².   BMI Complicating assessment and treatment. Placing patient at risk for multiple co-morbidities as well as early death and contributing to the patient's presentation. Education, and counseling provided.     Diet ADULT DIET; Regular  ADULT ORAL NUTRITION SUPPLEMENT; Breakfast, Lunch, Dinner; Standard High  today HISTORY: ORDERING SYSTEM PROVIDED HISTORY: altered mental status, speech delay TECHNOLOGIST PROVIDED HISTORY: Has a \"code stroke\" or \"stroke alert\" been called?->Yes Reason for exam:->altered mental status, speech delay Reason for Exam: altered mental status, speech delay FINDINGS: CTA NECK: AORTIC ARCH/ARCH VESSELS: No dissection or arterial injury.  No significant stenosis of the brachiocephalic or subclavian arteries. CAROTID ARTERIES: No dissection, arterial injury, or hemodynamically significant stenosis by NASCET criteria. VERTEBRAL ARTERIES: No dissection, arterial injury, or significant stenosis. SOFT TISSUES: There is ground-glass attenuation at the lung apices, likely related to underdistention versus pneumonitis.  No cervical or superior mediastinal lymphadenopathy.  The larynx and pharynx are unremarkable.  No acute abnormality of the salivary and thyroid glands. BONES: No acute osseous abnormality. CTA HEAD: ANTERIOR CIRCULATION: No significant stenosis of the intracranial internal carotid, anterior cerebral, or middle cerebral arteries. No aneurysm. POSTERIOR CIRCULATION: No significant stenosis of the basilar or posterior cerebral arteries. No aneurysm. OTHER: No dural venous sinus thrombosis on this non-dedicated study. BRAIN: No mass effect or midline shift. No extra-axial fluid collection. The gray-white differentiation is maintained.     No large vessel occlusion in the head or neck.     CT HEAD WO CONTRAST  Addendum Date: 5/5/2025  ADDENDUM: Findings were communicated to La WEBB by the CORE team on 5/5/2025 at 7:36 pm.     Result Date: 5/5/2025  EXAMINATION: CT OF THE HEAD WITHOUT CONTRAST  5/5/2025 5:46 pm TECHNIQUE: CT of the head was performed without the administration of intravenous contrast. Automated exposure control, iterative reconstruction, and/or weight based adjustment of the mA/kV was utilized to reduce the radiation dose to as low as reasonably achievable. COMPARISON:

## 2025-05-11 LAB
ALBUMIN SERPL-MCNC: 3.7 G/DL (ref 3.4–5)
ALBUMIN/GLOB SERPL: 1.2 {RATIO} (ref 1.1–2.2)
ALP SERPL-CCNC: 98 U/L (ref 40–129)
ALT SERPL-CCNC: 50 U/L (ref 10–40)
ANION GAP SERPL CALCULATED.3IONS-SCNC: 9 MMOL/L (ref 3–16)
AST SERPL-CCNC: 15 U/L (ref 15–37)
BASOPHILS # BLD: 0.2 K/UL (ref 0–0.2)
BASOPHILS NFR BLD: 1 %
BILIRUB SERPL-MCNC: <0.2 MG/DL (ref 0–1)
BUN SERPL-MCNC: 12 MG/DL (ref 7–20)
CALCIUM SERPL-MCNC: 9.3 MG/DL (ref 8.3–10.6)
CHLORIDE SERPL-SCNC: 103 MMOL/L (ref 99–110)
CO2 SERPL-SCNC: 23 MMOL/L (ref 21–32)
CREAT SERPL-MCNC: 0.6 MG/DL (ref 0.6–1.1)
DEPRECATED RDW RBC AUTO: 16.4 % (ref 12.4–15.4)
EOSINOPHIL # BLD: 0.1 K/UL (ref 0–0.6)
EOSINOPHIL NFR BLD: 0.7 %
GFR SERPLBLD CREATININE-BSD FMLA CKD-EPI: >90 ML/MIN/{1.73_M2}
GLUCOSE SERPL-MCNC: 113 MG/DL (ref 70–99)
HCT VFR BLD AUTO: 38.7 % (ref 36–48)
HGB BLD-MCNC: 12.6 G/DL (ref 12–16)
LYMPHOCYTES # BLD: 2.4 K/UL (ref 1–5.1)
LYMPHOCYTES NFR BLD: 14.7 %
MCH RBC QN AUTO: 26.1 PG (ref 26–34)
MCHC RBC AUTO-ENTMCNC: 32.6 G/DL (ref 31–36)
MCV RBC AUTO: 80.1 FL (ref 80–100)
MONOCYTES # BLD: 1.6 K/UL (ref 0–1.3)
MONOCYTES NFR BLD: 10.1 %
NEUTROPHILS # BLD: 11.8 K/UL (ref 1.7–7.7)
NEUTROPHILS NFR BLD: 73.5 %
PLATELET # BLD AUTO: 307 K/UL (ref 135–450)
PMV BLD AUTO: 7 FL (ref 5–10.5)
POTASSIUM SERPL-SCNC: 4 MMOL/L (ref 3.5–5.1)
PROT SERPL-MCNC: 6.7 G/DL (ref 6.4–8.2)
RBC # BLD AUTO: 4.83 M/UL (ref 4–5.2)
SODIUM SERPL-SCNC: 135 MMOL/L (ref 136–145)
WBC # BLD AUTO: 16 K/UL (ref 4–11)

## 2025-05-11 PROCEDURE — 2580000003 HC RX 258: Performed by: INTERNAL MEDICINE

## 2025-05-11 PROCEDURE — 6360000002 HC RX W HCPCS: Performed by: INTERNAL MEDICINE

## 2025-05-11 PROCEDURE — 6360000002 HC RX W HCPCS: Performed by: NURSE PRACTITIONER

## 2025-05-11 PROCEDURE — 6370000000 HC RX 637 (ALT 250 FOR IP): Performed by: INTERNAL MEDICINE

## 2025-05-11 PROCEDURE — 94640 AIRWAY INHALATION TREATMENT: CPT

## 2025-05-11 PROCEDURE — 80053 COMPREHEN METABOLIC PANEL: CPT

## 2025-05-11 PROCEDURE — 6370000000 HC RX 637 (ALT 250 FOR IP): Performed by: NURSE PRACTITIONER

## 2025-05-11 PROCEDURE — 85025 COMPLETE CBC W/AUTO DIFF WBC: CPT

## 2025-05-11 PROCEDURE — 94761 N-INVAS EAR/PLS OXIMETRY MLT: CPT

## 2025-05-11 PROCEDURE — 36415 COLL VENOUS BLD VENIPUNCTURE: CPT

## 2025-05-11 PROCEDURE — 2060000000 HC ICU INTERMEDIATE R&B

## 2025-05-11 PROCEDURE — 2500000003 HC RX 250 WO HCPCS: Performed by: NURSE PRACTITIONER

## 2025-05-11 PROCEDURE — 2700000000 HC OXYGEN THERAPY PER DAY

## 2025-05-11 PROCEDURE — 2500000003 HC RX 250 WO HCPCS: Performed by: INTERNAL MEDICINE

## 2025-05-11 RX ADMIN — LEVETIRACETAM 1000 MG: 100 INJECTION INTRAVENOUS at 18:19

## 2025-05-11 RX ADMIN — BENZONATATE 200 MG: 100 CAPSULE ORAL at 09:30

## 2025-05-11 RX ADMIN — SODIUM CHLORIDE, PRESERVATIVE FREE 10 ML: 5 INJECTION INTRAVENOUS at 21:05

## 2025-05-11 RX ADMIN — GABAPENTIN 900 MG: 300 CAPSULE ORAL at 12:03

## 2025-05-11 RX ADMIN — WATER 40 MG: 1 INJECTION INTRAMUSCULAR; INTRAVENOUS; SUBCUTANEOUS at 06:01

## 2025-05-11 RX ADMIN — SODIUM CHLORIDE, PRESERVATIVE FREE 10 ML: 5 INJECTION INTRAVENOUS at 18:18

## 2025-05-11 RX ADMIN — OXYCODONE AND ACETAMINOPHEN 1 TABLET: 325; 5 TABLET ORAL at 12:03

## 2025-05-11 RX ADMIN — WATER 40 MG: 1 INJECTION INTRAMUSCULAR; INTRAVENOUS; SUBCUTANEOUS at 21:04

## 2025-05-11 RX ADMIN — BENZONATATE 200 MG: 100 CAPSULE ORAL at 21:05

## 2025-05-11 RX ADMIN — ENOXAPARIN SODIUM 30 MG: 100 INJECTION SUBCUTANEOUS at 09:32

## 2025-05-11 RX ADMIN — ASPIRIN 81 MG: 81 TABLET, CHEWABLE ORAL at 09:30

## 2025-05-11 RX ADMIN — GABAPENTIN 600 MG: 300 CAPSULE ORAL at 09:29

## 2025-05-11 RX ADMIN — AZITHROMYCIN DIHYDRATE 250 MG: 250 TABLET ORAL at 09:29

## 2025-05-11 RX ADMIN — SERTRALINE 100 MG: 50 TABLET, FILM COATED ORAL at 09:30

## 2025-05-11 RX ADMIN — WATER 40 MG: 1 INJECTION INTRAMUSCULAR; INTRAVENOUS; SUBCUTANEOUS at 14:04

## 2025-05-11 RX ADMIN — Medication 10 ML: at 09:28

## 2025-05-11 RX ADMIN — LEVETIRACETAM 1000 MG: 100 INJECTION INTRAVENOUS at 06:06

## 2025-05-11 RX ADMIN — Medication 10 ML: at 21:56

## 2025-05-11 RX ADMIN — PANTOPRAZOLE SODIUM 40 MG: 40 TABLET, DELAYED RELEASE ORAL at 06:06

## 2025-05-11 RX ADMIN — SODIUM CHLORIDE 125 MG: 9 INJECTION, SOLUTION INTRAVENOUS at 14:09

## 2025-05-11 RX ADMIN — IPRATROPIUM BROMIDE AND ALBUTEROL SULFATE 1 DOSE: .5; 3 SOLUTION RESPIRATORY (INHALATION) at 20:11

## 2025-05-11 RX ADMIN — IPRATROPIUM BROMIDE AND ALBUTEROL SULFATE 1 DOSE: .5; 3 SOLUTION RESPIRATORY (INHALATION) at 08:38

## 2025-05-11 RX ADMIN — GABAPENTIN 1200 MG: 400 CAPSULE ORAL at 21:04

## 2025-05-11 RX ADMIN — SODIUM CHLORIDE, PRESERVATIVE FREE 10 ML: 5 INJECTION INTRAVENOUS at 14:04

## 2025-05-11 RX ADMIN — BENZONATATE 200 MG: 100 CAPSULE ORAL at 14:05

## 2025-05-11 RX ADMIN — SODIUM CHLORIDE, PRESERVATIVE FREE 10 ML: 5 INJECTION INTRAVENOUS at 09:29

## 2025-05-11 RX ADMIN — ENOXAPARIN SODIUM 30 MG: 100 INJECTION SUBCUTANEOUS at 21:04

## 2025-05-11 RX ADMIN — OXYCODONE AND ACETAMINOPHEN 1 TABLET: 325; 5 TABLET ORAL at 21:17

## 2025-05-11 RX ADMIN — Medication 10 ML: at 15:05

## 2025-05-11 RX ADMIN — Medication 10 ML: at 03:30

## 2025-05-11 ASSESSMENT — PAIN DESCRIPTION - FREQUENCY: FREQUENCY: CONTINUOUS

## 2025-05-11 ASSESSMENT — PAIN SCALES - GENERAL
PAINLEVEL_OUTOF10: 7
PAINLEVEL_OUTOF10: 5
PAINLEVEL_OUTOF10: 3
PAINLEVEL_OUTOF10: 7

## 2025-05-11 ASSESSMENT — PAIN DESCRIPTION - DESCRIPTORS
DESCRIPTORS: ACHING
DESCRIPTORS: ACHING

## 2025-05-11 ASSESSMENT — PAIN DESCRIPTION - LOCATION
LOCATION: GENERALIZED

## 2025-05-11 ASSESSMENT — PAIN DESCRIPTION - PAIN TYPE: TYPE: ACUTE PAIN

## 2025-05-11 ASSESSMENT — PAIN DESCRIPTION - ORIENTATION: ORIENTATION: MID

## 2025-05-11 ASSESSMENT — PAIN - FUNCTIONAL ASSESSMENT: PAIN_FUNCTIONAL_ASSESSMENT: ACTIVITIES ARE NOT PREVENTED

## 2025-05-11 ASSESSMENT — PAIN DESCRIPTION - DIRECTION: RADIATING_TOWARDS: GENERALIZED

## 2025-05-11 ASSESSMENT — PAIN DESCRIPTION - ONSET: ONSET: ON-GOING

## 2025-05-11 NOTE — PROGRESS NOTES
V2.0    [facility] Progress Note      Name:  Cely Ochoa /Age/Sex: 1985  (39 y.o. female)   MRN & CSN:  [id number] & [id number] Encounter Date/Time: 2025 1:05 PM EDT   Location:  [facility]3367/3367-01 PCP: Leigha Meneses DO     Attending:Tadeo Hunter MD       Hospital Day: 7    Assessment and Recommendations   Cely Ochoa is a 39 y.o. female with pmh of CVA, Asthma, obesity, seizures presented with shortness of breath, seizures and left sided weakness.      Acute left Sided Weakness and numbness: CVA ruled out  CT head negative.  CTA head and Neck showed no LVO.  MRI brain negative for acute abnormality.  Continue ASA, Statin.  Neurology consult appreciated: Likely functional neurological deficit.  No further testing at this time but follow-up with outpatient neurologist as scheduled.  PT,OT recommending ARU which was denied.   I had a lengthy discussion with the patient at this point they have self appealed for ARU.  I recommended that the look at skilled nursing facilities patient and family pretty clear that they are not going to go with skilled nursing if she  gets denied the plan is to go home    Psychogenic Non-Epileptic Events:  cvEEG  normal.  EEG without epileptiform discharges.   Continue AEDs. Seizure precaution.  Do not resume tramadol on discharge.      Human metapneumovirus infection with acute Asthma Exacerbation:   CT chest negative for acute abnormality.  Continue Steroids and Duonebs.   Off oxygen still having cough  IV steroids every 8 hours for another 24 hours.  Tomorrow she can be changed over to oral prednisone which can be tapered slowly over the next 7 days  Will give a Z-Shawn  Antitussives.    Iron deficiency anemia: Started on IV iron.     Obesity: Body mass index is 44.18 kg/m².   BMI Complicating assessment and treatment. Placing patient at risk for multiple co-morbidities as well as early death and contributing to the patient's presentation. Education, and counseling

## 2025-05-11 NOTE — PLAN OF CARE
Problem: Safety - Adult  Goal: Free from fall injury  5/10/2025 2252 by Sindy Leyva RN  Outcome: Progressing     Problem: Discharge Planning  Goal: Discharge to home or other facility with appropriate resources  5/10/2025 2252 by Sindy Leyva RN  Outcome: Progressing     Problem: Pain  Goal: Verbalizes/displays adequate comfort level or baseline comfort level  5/10/2025 2252 by Sindy Leyva RN  Outcome: Progressing     Problem: Skin/Tissue Integrity  Goal: Skin integrity remains intact  Description: 1.  Monitor for areas of redness and/or skin breakdown2.  Assess vascular access sites hourly3.  Every 4-6 hours minimum:  Change oxygen saturation probe site4.  Every 4-6 hours:  If on nasal continuous positive airway pressure, respiratory therapy assess nares and determine need for appliance change or resting period  5/10/2025 2252 by Sindy Leyva RN  Outcome: Progressing

## 2025-05-11 NOTE — PLAN OF CARE
Problem: Safety - Adult  Goal: Free from fall injury  5/11/2025 1044 by Linda Amaral RN  Outcome: Progressing  5/10/2025 2252 by Sindy Leyva RN  Outcome: Progressing     Problem: Chronic Conditions and Co-morbidities  Goal: Patient's chronic conditions and co-morbidity symptoms are monitored and maintained or improved  Outcome: Progressing     Problem: Discharge Planning  Goal: Discharge to home or other facility with appropriate resources  5/11/2025 1044 by Linda Amaral RN  Outcome: Progressing  5/10/2025 2252 by Sindy Leyva RN  Outcome: Progressing     Problem: Pain  Goal: Verbalizes/displays adequate comfort level or baseline comfort level  5/11/2025 1044 by Linda Amaral RN  Outcome: Progressing  5/10/2025 2252 by Sindy Leyva RN  Outcome: Progressing     Problem: Skin/Tissue Integrity  Goal: Skin integrity remains intact  Description: 1.  Monitor for areas of redness and/or skin breakdown2.  Assess vascular access sites hourly3.  Every 4-6 hours minimum:  Change oxygen saturation probe site4.  Every 4-6 hours:  If on nasal continuous positive airway pressure, respiratory therapy assess nares and determine need for appliance change or resting period  5/11/2025 1044 by Linda Amaral RN  Outcome: Progressing  5/10/2025 2252 by Sindy Leyva RN  Outcome: Progressing     Problem: Neurosensory - Adult  Goal: Achieves stable or improved neurological status  Outcome: Progressing  Goal: Achieves maximal functionality and self care  Outcome: Progressing     Problem: Infection - Adult  Goal: Absence of infection at discharge  Outcome: Progressing     Problem: Musculoskeletal - Adult  Goal: Return mobility to safest level of function  Outcome: Progressing     Problem: Respiratory - Adult  Goal: Achieves optimal ventilation and oxygenation  Outcome: Progressing     Problem: Gastrointestinal - Adult  Goal: Minimal or absence of nausea and vomiting  Outcome: Progressing  Goal: Maintains or

## 2025-05-12 PROBLEM — J21.1 ACUTE BRONCHIOLITIS DUE TO HUMAN METAPNEUMOVIRUS (HMPV): Status: ACTIVE | Noted: 2025-05-12

## 2025-05-12 PROBLEM — E61.1 IRON DEFICIENCY: Status: ACTIVE | Noted: 2025-05-12

## 2025-05-12 PROBLEM — J45.901 ASTHMA EXACERBATION: Status: ACTIVE | Noted: 2025-05-12

## 2025-05-12 LAB
ANION GAP SERPL CALCULATED.3IONS-SCNC: 10 MMOL/L (ref 3–16)
BASOPHILS # BLD: 0.2 K/UL (ref 0–0.2)
BASOPHILS NFR BLD: 1 %
BUN SERPL-MCNC: 14 MG/DL (ref 7–20)
CALCIUM SERPL-MCNC: 9 MG/DL (ref 8.3–10.6)
CHLORIDE SERPL-SCNC: 102 MMOL/L (ref 99–110)
CO2 SERPL-SCNC: 23 MMOL/L (ref 21–32)
CREAT SERPL-MCNC: 0.7 MG/DL (ref 0.6–1.1)
DEPRECATED RDW RBC AUTO: 16.6 % (ref 12.4–15.4)
EOSINOPHIL # BLD: 0.2 K/UL (ref 0–0.6)
EOSINOPHIL NFR BLD: 1 %
GFR SERPLBLD CREATININE-BSD FMLA CKD-EPI: >90 ML/MIN/{1.73_M2}
GLUCOSE SERPL-MCNC: 101 MG/DL (ref 70–99)
HCT VFR BLD AUTO: 39.2 % (ref 36–48)
HGB BLD-MCNC: 13 G/DL (ref 12–16)
LYMPHOCYTES # BLD: 3.3 K/UL (ref 1–5.1)
LYMPHOCYTES NFR BLD: 17 %
MCH RBC QN AUTO: 26.4 PG (ref 26–34)
MCHC RBC AUTO-ENTMCNC: 33.2 G/DL (ref 31–36)
MCV RBC AUTO: 79.5 FL (ref 80–100)
METAMYELOCYTES NFR BLD MANUAL: 5 %
MONOCYTES # BLD: 1.7 K/UL (ref 0–1.3)
MONOCYTES NFR BLD: 9 %
NEUTROPHILS # BLD: 13.1 K/UL (ref 1.7–7.7)
NEUTROPHILS NFR BLD: 66 %
PLATELET # BLD AUTO: 303 K/UL (ref 135–450)
PLATELET BLD QL SMEAR: ADEQUATE
PMV BLD AUTO: 7 FL (ref 5–10.5)
POTASSIUM SERPL-SCNC: 4.2 MMOL/L (ref 3.5–5.1)
RBC # BLD AUTO: 4.93 M/UL (ref 4–5.2)
RBC MORPH BLD: NORMAL
SLIDE REVIEW: ABNORMAL
SODIUM SERPL-SCNC: 135 MMOL/L (ref 136–145)
VARIANT LYMPHS NFR BLD MANUAL: 1 % (ref 0–6)
WBC # BLD AUTO: 18.4 K/UL (ref 4–11)

## 2025-05-12 PROCEDURE — 2060000000 HC ICU INTERMEDIATE R&B

## 2025-05-12 PROCEDURE — 6370000000 HC RX 637 (ALT 250 FOR IP): Performed by: INTERNAL MEDICINE

## 2025-05-12 PROCEDURE — 94761 N-INVAS EAR/PLS OXIMETRY MLT: CPT

## 2025-05-12 PROCEDURE — 36415 COLL VENOUS BLD VENIPUNCTURE: CPT

## 2025-05-12 PROCEDURE — 6360000002 HC RX W HCPCS: Performed by: INTERNAL MEDICINE

## 2025-05-12 PROCEDURE — 6370000000 HC RX 637 (ALT 250 FOR IP): Performed by: NURSE PRACTITIONER

## 2025-05-12 PROCEDURE — 6360000002 HC RX W HCPCS: Performed by: NURSE PRACTITIONER

## 2025-05-12 PROCEDURE — 97112 NEUROMUSCULAR REEDUCATION: CPT

## 2025-05-12 PROCEDURE — 2500000003 HC RX 250 WO HCPCS: Performed by: INTERNAL MEDICINE

## 2025-05-12 PROCEDURE — 2500000003 HC RX 250 WO HCPCS: Performed by: NURSE PRACTITIONER

## 2025-05-12 PROCEDURE — 97530 THERAPEUTIC ACTIVITIES: CPT

## 2025-05-12 PROCEDURE — 92526 ORAL FUNCTION THERAPY: CPT

## 2025-05-12 PROCEDURE — 85025 COMPLETE CBC W/AUTO DIFF WBC: CPT

## 2025-05-12 PROCEDURE — 97535 SELF CARE MNGMENT TRAINING: CPT

## 2025-05-12 PROCEDURE — 94640 AIRWAY INHALATION TREATMENT: CPT

## 2025-05-12 PROCEDURE — 97116 GAIT TRAINING THERAPY: CPT

## 2025-05-12 PROCEDURE — 80048 BASIC METABOLIC PNL TOTAL CA: CPT

## 2025-05-12 RX ORDER — BENZONATATE 200 MG/1
200 CAPSULE ORAL EVERY 8 HOURS
Qty: 21 CAPSULE | Refills: 0 | Status: ON HOLD | OUTPATIENT
Start: 2025-05-12 | End: 2025-05-23 | Stop reason: HOSPADM

## 2025-05-12 RX ORDER — PREDNISONE 20 MG/1
TABLET ORAL
Qty: 11 TABLET | Refills: 0 | Status: ON HOLD | OUTPATIENT
Start: 2025-05-12 | End: 2025-05-23 | Stop reason: HOSPADM

## 2025-05-12 RX ORDER — ALBUTEROL SULFATE 90 UG/1
2 INHALANT RESPIRATORY (INHALATION) 4 TIMES DAILY PRN
Qty: 18 G | Refills: 1 | Status: SHIPPED | OUTPATIENT
Start: 2025-05-12 | End: 2025-08-11

## 2025-05-12 RX ORDER — AZITHROMYCIN 250 MG/1
250 TABLET, FILM COATED ORAL DAILY
Qty: 2 TABLET | Refills: 0 | Status: ON HOLD | OUTPATIENT
Start: 2025-05-13 | End: 2025-05-23 | Stop reason: HOSPADM

## 2025-05-12 RX ORDER — LEVETIRACETAM 500 MG/1
1000 TABLET ORAL 2 TIMES DAILY
Status: DISCONTINUED | OUTPATIENT
Start: 2025-05-12 | End: 2025-05-13 | Stop reason: HOSPADM

## 2025-05-12 RX ORDER — PREDNISONE 20 MG/1
40 TABLET ORAL DAILY
Status: DISCONTINUED | OUTPATIENT
Start: 2025-05-13 | End: 2025-05-13 | Stop reason: HOSPADM

## 2025-05-12 RX ORDER — PREDNISONE 20 MG/1
40 TABLET ORAL DAILY
Status: DISCONTINUED | OUTPATIENT
Start: 2025-05-12 | End: 2025-05-12

## 2025-05-12 RX ORDER — BENZONATATE 100 MG/1
200 CAPSULE ORAL EVERY 8 HOURS
Status: DISCONTINUED | OUTPATIENT
Start: 2025-05-12 | End: 2025-05-13 | Stop reason: HOSPADM

## 2025-05-12 RX ORDER — FERROUS SULFATE 325(65) MG
325 TABLET ORAL
Qty: 30 TABLET | Refills: 5 | Status: ON HOLD | OUTPATIENT
Start: 2025-05-12 | End: 2025-05-23

## 2025-05-12 RX ADMIN — OXYCODONE AND ACETAMINOPHEN 1 TABLET: 325; 5 TABLET ORAL at 10:05

## 2025-05-12 RX ADMIN — GABAPENTIN 900 MG: 300 CAPSULE ORAL at 11:55

## 2025-05-12 RX ADMIN — SERTRALINE 100 MG: 50 TABLET, FILM COATED ORAL at 08:54

## 2025-05-12 RX ADMIN — LEVETIRACETAM 1000 MG: 100 INJECTION INTRAVENOUS at 04:36

## 2025-05-12 RX ADMIN — Medication 10 ML: at 08:54

## 2025-05-12 RX ADMIN — ENOXAPARIN SODIUM 30 MG: 100 INJECTION SUBCUTANEOUS at 21:23

## 2025-05-12 RX ADMIN — ASPIRIN 81 MG: 81 TABLET, CHEWABLE ORAL at 08:54

## 2025-05-12 RX ADMIN — PANTOPRAZOLE SODIUM 40 MG: 40 TABLET, DELAYED RELEASE ORAL at 07:52

## 2025-05-12 RX ADMIN — ENOXAPARIN SODIUM 30 MG: 100 INJECTION SUBCUTANEOUS at 08:55

## 2025-05-12 RX ADMIN — LEVETIRACETAM 1000 MG: 500 TABLET, FILM COATED ORAL at 10:16

## 2025-05-12 RX ADMIN — BENZONATATE 200 MG: 100 CAPSULE ORAL at 08:55

## 2025-05-12 RX ADMIN — WATER 40 MG: 1 INJECTION INTRAMUSCULAR; INTRAVENOUS; SUBCUTANEOUS at 21:22

## 2025-05-12 RX ADMIN — GABAPENTIN 600 MG: 300 CAPSULE ORAL at 08:54

## 2025-05-12 RX ADMIN — WATER 40 MG: 1 INJECTION INTRAMUSCULAR; INTRAVENOUS; SUBCUTANEOUS at 13:51

## 2025-05-12 RX ADMIN — BENZONATATE 200 MG: 100 CAPSULE ORAL at 16:29

## 2025-05-12 RX ADMIN — AZITHROMYCIN DIHYDRATE 250 MG: 250 TABLET ORAL at 08:55

## 2025-05-12 RX ADMIN — OXYCODONE AND ACETAMINOPHEN 1 TABLET: 325; 5 TABLET ORAL at 21:21

## 2025-05-12 RX ADMIN — SODIUM CHLORIDE, PRESERVATIVE FREE 10 ML: 5 INJECTION INTRAVENOUS at 21:22

## 2025-05-12 RX ADMIN — GUAIFENESIN, DEXTROMETHORPHAN HBR 2 TABLET: 600; 30 TABLET ORAL at 12:31

## 2025-05-12 RX ADMIN — GUAIFENESIN, DEXTROMETHORPHAN HBR 2 TABLET: 600; 30 TABLET ORAL at 21:28

## 2025-05-12 RX ADMIN — GABAPENTIN 1200 MG: 400 CAPSULE ORAL at 21:21

## 2025-05-12 RX ADMIN — IPRATROPIUM BROMIDE AND ALBUTEROL SULFATE 1 DOSE: .5; 3 SOLUTION RESPIRATORY (INHALATION) at 20:45

## 2025-05-12 RX ADMIN — SODIUM CHLORIDE, PRESERVATIVE FREE 10 ML: 5 INJECTION INTRAVENOUS at 13:51

## 2025-05-12 RX ADMIN — IPRATROPIUM BROMIDE AND ALBUTEROL SULFATE 1 DOSE: .5; 3 SOLUTION RESPIRATORY (INHALATION) at 11:17

## 2025-05-12 RX ADMIN — SODIUM CHLORIDE, PRESERVATIVE FREE 10 ML: 5 INJECTION INTRAVENOUS at 08:55

## 2025-05-12 RX ADMIN — Medication 10 ML: at 03:58

## 2025-05-12 RX ADMIN — WATER 40 MG: 1 INJECTION INTRAMUSCULAR; INTRAVENOUS; SUBCUTANEOUS at 04:02

## 2025-05-12 RX ADMIN — LEVETIRACETAM 1000 MG: 500 TABLET, FILM COATED ORAL at 21:21

## 2025-05-12 ASSESSMENT — PAIN SCALES - GENERAL
PAINLEVEL_OUTOF10: 4
PAINLEVEL_OUTOF10: 3
PAINLEVEL_OUTOF10: 7
PAINLEVEL_OUTOF10: 8

## 2025-05-12 ASSESSMENT — PAIN DESCRIPTION - LOCATION: LOCATION: GENERALIZED

## 2025-05-12 NOTE — PROGRESS NOTES
Speech Language Pathology  [facility]  [phone]  SLP Dysphagia Treatment       Patient: Cely Ochoa   : 1985   MRN: [id number]      Evaluation Date: 2025      Admitting Dx: Facial droop [R29.810]  Acute left-sided weakness [R53.1]  Seizure-like activity (HCC) [R56.9]  Treatment Diagnosis: Oropharyngeal Dysphagia   Pain: Did not state                                  Recommendations      Recommended Diet and Intervention 2025:  Diet Solids Recommendation:  Regular texture diet  Liquid Consistency Recommendation:  Thin liquids  Recommended form of Meds: Meds whole with water or Meds in puree      Compensatory strategies: Aspiration Precautions , Alternate solids/liquids , Eat or Feed Slowly, Lingual Sweep , Small Bites and Sips , Upright as possible with all PO intake     Discharge Recommendations:  Discharge recommendations to be determined pending ongoing follow-up during acute care stay    History/Course of Treatment     H&P: Cely Ochoa is a 39 y.o. female with pmh of asthma, obesity, seizures who presents with shortness of breath and seizures.  Patient initially presented to the emergency room for shortness of breath and seizures.  Her spouse is at bedside and states since Thursday she has had some upper respiratory symptoms.  She denies any fever.  She had a coughing episode at home and felt very short of breath so they called EMS.  Patient then vomited and had a seizure.  Wife reports tonic clonic seizure.  She had 1st seizure at 5:30pm.  She then proceeded to have 3 additional seizures prior to getting the emergency room.  And 1 seizure in the emergency room that was unwitnessed by nurses..  Patient was postictal on presentation to ER.  When she started wake up spouse noticed left facial droop with left arm and leg weakness.  Wife does states she has had increasing seizures over the last year last one was in February.  She does tend to have

## 2025-05-12 NOTE — DISCHARGE INSTR - COC
Continuity of Care Form    Patient Name: Cely Ochoa   :  1985  MRN:  [id number]    Admit date:  2025  Discharge date:  ***    Code Status Order: Full Code   Advance Directives:     Admitting Physician:  Sara Orosco DO  PCP: Leigha Meneses DO    Discharging Nurse: ***  Discharging Hospital Unit/Room#: [id number]-3367/3367-01  Discharging Unit Phone Number: ***    Emergency Contact:   Extended Emergency Contact Information  Primary Emergency Contact: Bonnie Ochoa  Address: [address] [place] [address]  Home Phone: [phone]  Relation: Spouse  Secondary Emergency Contact: Lauryn Hong  Home Phone: [phone]  Relation: Parent    Past Surgical History:  Past Surgical History:   Procedure Laterality Date    SKIN BIOPSY      TONSILLECTOMY      TOOTH EXTRACTION         Immunization History:   Immunization History   Administered Date(s) Administered    Hep B, HEPLISAV-B, (age 18y+), IM, 0.5mL 2024    Pneumococcal, PCV20, PREVNAR 20, (age 6w+), IM, 0.5mL 2024    TDaP, ADACEL (age 10y-64y), BOOSTRIX (age 10y+), IM, 0.5mL 2024       Active Problems:  Patient Active Problem List   Diagnosis Code    Left foot drop M21.372    Pain in both feet M79.671, M79.672    Pulmonary nodules R91.8    History of hemorrhagic cerebrovascular accident (CVA) with residual deficit I69.30    Current smoker F17.200    Moderate episode of recurrent major depressive disorder (HCC) F33.1    EVI (generalized anxiety disorder) F41.1    Allergy-induced asthma, mild intermittent, uncomplicated J45.20    Fluid retention in legs R60.0    Primary osteoarthritis involving multiple joints M15.0    BMI 45.0-49.9, adult ([facility]) Z68.42    Seizure disorder ([facility]) G40.909    Chronic pain of both knees M25.561, M25.562, G89.29    Weakness of left upper extremity R29.898    Acute left-sided weakness R53.1    Seizure-like activity ([facility]) R56.9    Acute bronchiolitis due to human metapneumovirus

## 2025-05-12 NOTE — PLAN OF CARE
Problem: Safety - Adult  Goal: Free from fall injury  Outcome: Progressing     Problem: Chronic Conditions and Co-morbidities  Goal: Patient's chronic conditions and co-morbidity symptoms are monitored and maintained or improved  Outcome: Progressing     Problem: Discharge Planning  Goal: Discharge to home or other facility with appropriate resources  Outcome: Progressing  Flowsheets (Taken 5/11/2025 2000)  Discharge to home or other facility with appropriate resources: Identify barriers to discharge with patient and caregiver     Problem: Pain  Goal: Verbalizes/displays adequate comfort level or baseline comfort level  Outcome: Progressing  Flowsheets (Taken 5/11/2025 2100)  Verbalizes/displays adequate comfort level or baseline comfort level: Encourage patient to monitor pain and request assistance     Problem: Skin/Tissue Integrity  Goal: Skin integrity remains intact  Description: 1.  Monitor for areas of redness and/or skin breakdown2.  Assess vascular access sites hourly3.  Every 4-6 hours minimum:  Change oxygen saturation probe site4.  Every 4-6 hours:  If on nasal continuous positive airway pressure, respiratory therapy assess nares and determine need for appliance change or resting period  Outcome: Progressing  Flowsheets (Taken 5/11/2025 2000)  Skin Integrity Remains Intact: Monitor for areas of redness and/or skin breakdown     Problem: Neurosensory - Adult  Goal: Achieves stable or improved neurological status  Outcome: Progressing  Goal: Achieves maximal functionality and self care  Outcome: Progressing     Problem: Infection - Adult  Goal: Absence of infection at discharge  Outcome: Progressing     Problem: Musculoskeletal - Adult  Goal: Return mobility to safest level of function  Outcome: Progressing     Problem: Respiratory - Adult  Goal: Achieves optimal ventilation and oxygenation  Outcome: Progressing     Problem: Gastrointestinal - Adult  Goal: Minimal or absence of nausea and

## 2025-05-12 NOTE — PLAN OF CARE
Problem: Safety - Adult  Goal: Free from fall injury  5/12/2025 0935 by Linda Amaral RN  Outcome: Progressing  5/12/2025 0258 by Kait Gardiner RN  Outcome: Progressing     Problem: Chronic Conditions and Co-morbidities  Goal: Patient's chronic conditions and co-morbidity symptoms are monitored and maintained or improved  5/12/2025 0935 by Linda Amaral RN  Outcome: Progressing  5/12/2025 0258 by Kait Gardiner RN  Outcome: Progressing     Problem: Discharge Planning  Goal: Discharge to home or other facility with appropriate resources  5/12/2025 0935 by Linda Amaral RN  Outcome: Progressing  5/12/2025 0258 by Kait Gardiner RN  Outcome: Progressing  Flowsheets (Taken 5/11/2025 2000)  Discharge to home or other facility with appropriate resources: Identify barriers to discharge with patient and caregiver     Problem: Pain  Goal: Verbalizes/displays adequate comfort level or baseline comfort level  5/12/2025 0935 by Linda Amaral RN  Outcome: Progressing  5/12/2025 0258 by Kait Gardiner RN  Outcome: Progressing  Flowsheets (Taken 5/11/2025 2100)  Verbalizes/displays adequate comfort level or baseline comfort level: Encourage patient to monitor pain and request assistance     Problem: Skin/Tissue Integrity  Goal: Skin integrity remains intact  Description: 1.  Monitor for areas of redness and/or skin breakdown2.  Assess vascular access sites hourly3.  Every 4-6 hours minimum:  Change oxygen saturation probe site4.  Every 4-6 hours:  If on nasal continuous positive airway pressure, respiratory therapy assess nares and determine need for appliance change or resting period  5/12/2025 0935 by Linda Amaral, RN  Outcome: Progressing  5/12/2025 0258 by Kait Gardiner RN  Outcome: Progressing  Flowsheets (Taken 5/11/2025 2000)  Skin Integrity Remains Intact: Monitor for areas of redness and/or skin breakdown     Problem: Neurosensory - Adult  Goal: Achieves stable or improved neurological

## 2025-05-12 NOTE — DISCHARGE SUMMARY
V2.0  Discharge Summary    Name:  Cely Ochoa /Age/Sex: 1985 (39 y.o. female)   Admit Date: 2025  Discharge Date: 25    MRN & CSN:  [id number] & [id number] Encounter Date and Time 25 11:58 AM EDT    Attending:  Renée Cosby MD Discharging Provider: Renée Cosby MD       Hospital Course:     Brief HPI: Cely Ochoa is a 39 y.o. female with pmh of CVA, psychogenic nonepileptic events, asthma, obesity, seizures presented with shortness of breath, seizures and left sided weakness.       Acute left Sided Weakness and numbness: CVA ruled out  CT head negative.  CTA head and Neck showed no LVO.  MRI brain negative for acute abnormality.  Continue ASA, Statin.  Neurology consulted: Likely functional neurological deficit.  No further testing at this time but follow-up with outpatient neurologist as scheduled.  PT,OT recommending ARU which was denied.   Patient and family requested transfer to [facility] which was refused by neurologist on-call and advised to follow-up outpatient.  After a lengthy discussion with the patient they decided to personally appeal the ARU denial. SNF was recommended but patient and family refused.     Psychogenic Non-Epileptic Events:  cvEEG  normal.  EEG without epileptiform discharges.   Continue AEDs. Seizure precaution.  Tramadol discontinued on discharge     Human metapneumovirus infection with acute Asthma Exacerbation:   CT chest negative for acute abnormality.  Improved on antitussives, azithromycin, steroids and Duonebs.   Weaned off supplemental oxygen.  Complete prednisone taper and Z-Shawn.     Iron deficiency anemia: Given IV iron inpatient.  Discharged on FeSO4.     Obesity: Body mass index is 44.18 kg/m².   BMI Complicating assessment and treatment. Placing patient at risk for multiple co-morbidities as well as early death and contributing to the patient's presentation. Education, and counseling provided.       The patient expressed  capsule  Commonly known as: BENADRYL     ergocalciferol 1.25 MG (94670 UT) capsule  Commonly known as: ERGOCALCIFEROL     gabapentin 600 MG tablet  Commonly known as: NEURONTIN  Take 1 tablet by mouth 3 times daily for 90 days.     hydroCHLOROthiazide 12.5 MG tablet     ibuprofen 800 MG tablet  Commonly known as: ADVIL;MOTRIN  Take 1 tablet by mouth 2 times daily as needed for Pain     levETIRAcetam 250 MG tablet  Commonly known as: KEPPRA  TAKE FOUR TABLETS BY MOUTH TWICE A DAY     pantoprazole 40 MG tablet  Commonly known as: PROTONIX     sertraline 100 MG tablet  Commonly known as: ZOLOFT  Take 1 tablet by mouth daily     traZODone 50 MG tablet  Commonly known as: DESYREL  Take 1 tablet by mouth nightly     vitamin B-12 1000 MCG tablet  Commonly known as: CYANOCOBALAMIN            STOP taking these medications      omeprazole 20 MG delayed release capsule  Commonly known as: PRILOSEC     traMADol 50 MG tablet  Commonly known as: ULTRAM               Where to Get Your Medications        These medications were sent to LTAC, located within [facility] [id number] [place] [address] - P [phone] - F [phone]  [address], [facility] [address]      Phone: [phone]   albuterol sulfate  (90 Base) MCG/ACT inhaler  azithromycin 250 MG tablet  benzonatate 200 MG capsule  dextromethorphan-guaiFENesin  MG per extended release tablet  ferrous sulfate 325 (65 Fe) MG tablet  predniSONE 20 MG tablet        Objective Findings at Discharge:   /79   Pulse 90   Temp 98.3 °F (36.8 °C) (Temporal)   Resp 18   Ht 1.778 m (5' 10\")   Wt (!) 138.8 kg (305 lb 14.4 oz)   SpO2 95%   BMI 43.89 kg/m²       Physical Exam:   General appearance: No apparent distress, appears stated age and cooperative.  HEENT: Pupils equal, round, and reactive to light. Conjunctivae clear.  Neck: Supple, with full range of motion. Trachea midline.  Respiratory: Air entry bilaterally equal no wheeze  Cardiovascular: Regular

## 2025-05-12 NOTE — CARE COORDINATION
Discharge Planning:    CM spoke with pt and pt informed CM that they would like to do SNF:  [facility]  [address] [place] [address]  Report: [phone]  Fax: [phone]     [facility]  [address] [place] [address]  Phone:[phone]  Fax:[phone]     CM sent referrals, updated the provider. Pending acceptance.    Update: BARBIE called Catarina with [facility] to see if they received the referral, Catarina reports she will call CM back.    Electronically signed by Hayes Cruz on 5/12/2025 at 1:15 PM  Electronically signed by Hayes Cruz on 5/12/2025 at 3:05 PM

## 2025-05-12 NOTE — PROGRESS NOTES
not associated with a discharge to the patient's home setting. Based on the patient's AM-PAC score and their current functional mobility deficits, it is recommended that the patient have 5-7 sessions per week of Physical Therapy at d/c to increase the patient's independence.  At this time, this patient demonstrates complex nursing, medical, and rehabilitative needs, and would benefit from intensive rehabilitation services upon discharge from the Inpatient setting.  This patient demonstrates the ability to participate in and benefit from an intensive therapy program with a coordinated interdisciplinary team approach to foster frequent, structured, and documented communication among disciplines, who will work together to establish, prioritize, and achieve treatment goals. Please see assessment section for further patient specific details.    If patient discharges prior to next session this note will serve as a discharge summary.  Please see below for the latest assessment towards goals.      DME Required For Discharge: DME to be determined at next level of care, DME to be determined pending patient progress  Precautions/Restrictions: high fall risk, contact precautions, seizure, Droplet precautions  Weight Bearing Restrictions: no restrictions  [] Right Upper Extremity  [] Left Upper Extremity [] Right Lower Extremity  [] Left Lower Extremity     Required Braces/Orthotics: no braces required   [] Right  [] Left  Positional Restrictions:no positional restrictions    Pre-Admission Information   Lives With: spouse    Type of Home:  [facility]  Home Layout:  3 levels, full flights up steps, railings between first and second floors (no railing to basement)  Home Access:  3 step to enter without rails   Bathroom Layout: tub/shower unit  Bathroom Equipment: shower chair, 3-in-1 commode  Toilet Height: standard height  Home Equipment: rolling walker, manual wheelchair, AFO - but does not fit and has not worn it in years.  Multiple sit<>stands during session from EOB to the w/c and from the w/c to stand and from w/c to BS chair  Ambulation:  Surface:level surface  Assistive Device: parallel bars  Assistance: moderate assistance  Distance: 6' x 2  Gait Mechanics:Pt. Has decreased tone, Leans R to advance R LE but is able with verbal cues, initial contact with forefoot, hyperextends at the knee, L ankle wants to supinate but PT spots both knee and ankle.  L hand falls off the parallel bar intermittently.  Comments:  ambulation in hightops for increased ankle support.  Pt. Also has close w/c follow  Ambulation Trial 2  Surface:level surface  Assistive Device: willams rail  Assistance: moderate assistance  Distance:25' x 1  Gait Mechanics: As above.    Comments:   Fatigued at end of ambulation, close w/c follow  Stair Mobility:  Stair mobility not completed on this date.  Comments:  Wheelchair Mobility:  Chair: manual  Surface: level surface  Method: (R) UE, (L) UE, and (R) LE  Distance: 200' x 2 ft  Assistance: stand by assistance  Comments: Pt. Performed, attempting to use L LE as well but unsafe d/t catching on the floor.  L LE placed on foot rest.  Pt. Maneuvered 90 and 180 degree turns with SBA.  L UE utilized but tends to use the dorsal surface of the wrist.  Balance:  Static Sitting Balance: fair (-): maintains balance at SBA with use of UE support  Dynamic Sitting Balance: fair (-): maintains balance at CGA with use of UE support  Static Standing Balance: poor (+): requires min (A) to maintain balance  Dynamic Standing Balance: poor: requires mod (A) to maintain balance  Comments:      Other Therapeutic Interventions  See OT notes for ADLs; performed seated at the EOB  Set up for comfort in the BS chair.  Functional Outcomes  AM-PAC Inpatient Mobility Raw Score : 15              Cognition  Overall Cognitive Status: Impaired  Arousal/Alertness: delayed responses to stimuli  Following Commands: follows one step commands

## 2025-05-12 NOTE — PROGRESS NOTES
[facility] - Inpatient Rehabilitation Department   Phone: [phone]    Occupational Therapy    [] Initial Evaluation            [x] Daily Treatment Note         [] Discharge Summary      Patient: Cely Ochoa   : 1985   MRN: [id number]   Date of Service:  2025    Admitting Diagnosis:  Acute left-sided weakness  Current Admission Summary: Cely Ochoa is a 39 y.o. female with pmh of asthma, obesity, seizures who presents with shortness of breath and seizures.  Patient initially presented to the emergency room for shortness of breath and seizures.  Her spouse is at bedside and states since Thursday she has had some upper respiratory symptoms.  She denies any fever.  She had a coughing episode at home and felt very short of breath so they called EMS.  Patient then vomited and had a seizure.  Wife reports tonic clonic seizure.  She had 1st seizure at 5:30pm.  She then proceeded to have 3 additional seizures prior to getting the emergency room.  And 1 seizure in the emergency room that was unwitnessed by nurses..  Patient was postictal on presentation to ER.  When she started wake up spouse noticed left facial droop with left arm and leg weakness.  Wife does states she has had increasing seizures over the last year last one was in February.  She does tend to have seizures when she is ill.   She is currently not established with neurology but takes Keppra 1gm BID.   Stroke team was called  she was not a TNK candidate secondary to seizure activity.   Past Medical History:  has a past medical history of Anxiety, Asthma, Cerebrovascular disease, Depression, Left foot drop, Meningitis, Seizures (HCC), and Stroke (HCC).  Past Surgical History:  has a past surgical history that includes Tonsillectomy; skin biopsy; and Tooth Extraction (2019).    Discharge Recommendations: Cely Ochoa scored a 17/24 on the AM-PAC ADL Inpatient form. Current research shows that an AM-PAC score of 17 or less is

## 2025-05-12 NOTE — CARE COORDINATION
05/12/25 1501   IMM Letter   IMM Letter given to Patient/Family/Significant other/Guardian/POA/by: case management   IMM Letter date given: 05/12/25   IMM Letter time given: 1500     Electronically signed by Hayes Cruz on 5/12/2025 at 3:02 PM

## 2025-05-13 ENCOUNTER — HOSPITAL ENCOUNTER (INPATIENT)
Age: 40
LOS: 11 days | Discharge: HOME HEALTH | DRG: 057 | End: 2025-05-24
Attending: STUDENT IN AN ORGANIZED HEALTH CARE EDUCATION/TRAINING PROGRAM | Admitting: STUDENT IN AN ORGANIZED HEALTH CARE EDUCATION/TRAINING PROGRAM
Payer: MEDICARE

## 2025-05-13 VITALS
TEMPERATURE: 97.4 F | HEIGHT: 70 IN | RESPIRATION RATE: 18 BRPM | WEIGHT: 293 LBS | HEART RATE: 80 BPM | DIASTOLIC BLOOD PRESSURE: 83 MMHG | OXYGEN SATURATION: 95 % | SYSTOLIC BLOOD PRESSURE: 124 MMHG | BODY MASS INDEX: 41.95 KG/M2

## 2025-05-13 DIAGNOSIS — M15.0 PRIMARY OSTEOARTHRITIS INVOLVING MULTIPLE JOINTS: ICD-10-CM

## 2025-05-13 DIAGNOSIS — M25.561 CHRONIC PAIN OF BOTH KNEES: ICD-10-CM

## 2025-05-13 DIAGNOSIS — M25.562 CHRONIC PAIN OF BOTH KNEES: ICD-10-CM

## 2025-05-13 DIAGNOSIS — G89.29 OTHER CHRONIC PAIN: ICD-10-CM

## 2025-05-13 DIAGNOSIS — F44.7 FUNCTIONAL NEUROLOGICAL SYMPTOM DISORDER WITH MIXED SYMPTOMS: Primary | ICD-10-CM

## 2025-05-13 DIAGNOSIS — G89.29 CHRONIC PAIN OF BOTH KNEES: ICD-10-CM

## 2025-05-13 PROCEDURE — 6370000000 HC RX 637 (ALT 250 FOR IP): Performed by: STUDENT IN AN ORGANIZED HEALTH CARE EDUCATION/TRAINING PROGRAM

## 2025-05-13 PROCEDURE — 6370000000 HC RX 637 (ALT 250 FOR IP): Performed by: INTERNAL MEDICINE

## 2025-05-13 PROCEDURE — 94761 N-INVAS EAR/PLS OXIMETRY MLT: CPT

## 2025-05-13 PROCEDURE — 1280000000 HC REHAB R&B

## 2025-05-13 PROCEDURE — 6360000002 HC RX W HCPCS: Performed by: NURSE PRACTITIONER

## 2025-05-13 PROCEDURE — 6370000000 HC RX 637 (ALT 250 FOR IP): Performed by: NURSE PRACTITIONER

## 2025-05-13 PROCEDURE — 2500000003 HC RX 250 WO HCPCS: Performed by: NURSE PRACTITIONER

## 2025-05-13 PROCEDURE — 94640 AIRWAY INHALATION TREATMENT: CPT

## 2025-05-13 PROCEDURE — 92526 ORAL FUNCTION THERAPY: CPT

## 2025-05-13 PROCEDURE — 6360000002 HC RX W HCPCS: Performed by: STUDENT IN AN ORGANIZED HEALTH CARE EDUCATION/TRAINING PROGRAM

## 2025-05-13 RX ORDER — LEVETIRACETAM 500 MG/1
1000 TABLET ORAL 2 TIMES DAILY
Status: DISCONTINUED | OUTPATIENT
Start: 2025-05-13 | End: 2025-05-24 | Stop reason: HOSPADM

## 2025-05-13 RX ORDER — LANOLIN ALCOHOL/MO/W.PET/CERES
1000 CREAM (GRAM) TOPICAL DAILY
Status: DISCONTINUED | OUTPATIENT
Start: 2025-05-14 | End: 2025-05-24 | Stop reason: HOSPADM

## 2025-05-13 RX ORDER — GABAPENTIN 300 MG/1
900 CAPSULE ORAL
Status: DISCONTINUED | OUTPATIENT
Start: 2025-05-14 | End: 2025-05-24 | Stop reason: HOSPADM

## 2025-05-13 RX ORDER — CETIRIZINE HYDROCHLORIDE 10 MG/1
5 TABLET ORAL DAILY
Status: DISCONTINUED | OUTPATIENT
Start: 2025-05-14 | End: 2025-05-24 | Stop reason: HOSPADM

## 2025-05-13 RX ORDER — AZITHROMYCIN 250 MG/1
250 TABLET, FILM COATED ORAL DAILY
Status: COMPLETED | OUTPATIENT
Start: 2025-05-14 | End: 2025-05-14

## 2025-05-13 RX ORDER — ACETAMINOPHEN 325 MG/1
650 TABLET ORAL EVERY 4 HOURS PRN
Status: CANCELLED | OUTPATIENT
Start: 2025-05-13

## 2025-05-13 RX ORDER — FERROUS SULFATE 325(65) MG
325 TABLET ORAL
Status: CANCELLED | OUTPATIENT
Start: 2025-05-14

## 2025-05-13 RX ORDER — ERGOCALCIFEROL 1.25 MG/1
50000 CAPSULE, LIQUID FILLED ORAL WEEKLY
Status: CANCELLED | OUTPATIENT
Start: 2025-05-14

## 2025-05-13 RX ORDER — PANTOPRAZOLE SODIUM 40 MG/1
40 TABLET, DELAYED RELEASE ORAL
Status: DISCONTINUED | OUTPATIENT
Start: 2025-05-14 | End: 2025-05-24 | Stop reason: HOSPADM

## 2025-05-13 RX ORDER — IBUPROFEN 400 MG/1
600 TABLET, FILM COATED ORAL EVERY 6 HOURS PRN
Status: CANCELLED | OUTPATIENT
Start: 2025-05-13

## 2025-05-13 RX ORDER — PREDNISONE 20 MG/1
40 TABLET ORAL DAILY
Status: CANCELLED | OUTPATIENT
Start: 2025-05-14 | End: 2025-05-16

## 2025-05-13 RX ORDER — BENZONATATE 100 MG/1
200 CAPSULE ORAL EVERY 8 HOURS
Status: DISCONTINUED | OUTPATIENT
Start: 2025-05-14 | End: 2025-05-16

## 2025-05-13 RX ORDER — FERROUS SULFATE 325(65) MG
325 TABLET ORAL
Status: DISCONTINUED | OUTPATIENT
Start: 2025-05-14 | End: 2025-05-24 | Stop reason: HOSPADM

## 2025-05-13 RX ORDER — TRAZODONE HYDROCHLORIDE 50 MG/1
50 TABLET ORAL NIGHTLY PRN
Status: DISCONTINUED | OUTPATIENT
Start: 2025-05-13 | End: 2025-05-24 | Stop reason: HOSPADM

## 2025-05-13 RX ORDER — ALBUTEROL SULFATE 90 UG/1
2 INHALANT RESPIRATORY (INHALATION) 4 TIMES DAILY PRN
Status: CANCELLED | OUTPATIENT
Start: 2025-05-13

## 2025-05-13 RX ORDER — BUTALBITAL, ACETAMINOPHEN AND CAFFEINE 50; 325; 40 MG/1; MG/1; MG/1
1 TABLET ORAL EVERY 4 HOURS PRN
Status: CANCELLED | OUTPATIENT
Start: 2025-05-13

## 2025-05-13 RX ORDER — OXYCODONE AND ACETAMINOPHEN 5; 325 MG/1; MG/1
1 TABLET ORAL EVERY 6 HOURS PRN
Refills: 0 | Status: DISCONTINUED | OUTPATIENT
Start: 2025-05-13 | End: 2025-05-24 | Stop reason: HOSPADM

## 2025-05-13 RX ORDER — LANOLIN ALCOHOL/MO/W.PET/CERES
1000 CREAM (GRAM) TOPICAL DAILY
Status: CANCELLED | OUTPATIENT
Start: 2025-05-14

## 2025-05-13 RX ORDER — LEVETIRACETAM 500 MG/1
1000 TABLET ORAL 2 TIMES DAILY
Status: CANCELLED | OUTPATIENT
Start: 2025-05-13

## 2025-05-13 RX ORDER — GABAPENTIN 300 MG/1
600 CAPSULE ORAL DAILY
Status: CANCELLED | OUTPATIENT
Start: 2025-05-14

## 2025-05-13 RX ORDER — PREDNISONE 20 MG/1
40 TABLET ORAL DAILY
Status: COMPLETED | OUTPATIENT
Start: 2025-05-14 | End: 2025-05-15

## 2025-05-13 RX ORDER — SENNOSIDES 8.6 MG/1
2 TABLET ORAL DAILY PRN
Status: CANCELLED | OUTPATIENT
Start: 2025-05-13

## 2025-05-13 RX ORDER — BUTALBITAL, ACETAMINOPHEN AND CAFFEINE 50; 325; 40 MG/1; MG/1; MG/1
1 TABLET ORAL EVERY 4 HOURS PRN
Status: DISCONTINUED | OUTPATIENT
Start: 2025-05-13 | End: 2025-05-24 | Stop reason: HOSPADM

## 2025-05-13 RX ORDER — ENOXAPARIN SODIUM 100 MG/ML
30 INJECTION SUBCUTANEOUS 2 TIMES DAILY
Status: CANCELLED | OUTPATIENT
Start: 2025-05-13

## 2025-05-13 RX ORDER — POLYETHYLENE GLYCOL 3350 17 G/17G
17 POWDER, FOR SOLUTION ORAL DAILY PRN
Status: DISCONTINUED | OUTPATIENT
Start: 2025-05-13 | End: 2025-05-24 | Stop reason: HOSPADM

## 2025-05-13 RX ORDER — PREDNISONE 20 MG/1
20 TABLET ORAL DAILY
Status: CANCELLED | OUTPATIENT
Start: 2025-05-16 | End: 2025-05-19

## 2025-05-13 RX ORDER — ACETAMINOPHEN 325 MG/1
650 TABLET ORAL EVERY 4 HOURS PRN
Status: DISCONTINUED | OUTPATIENT
Start: 2025-05-13 | End: 2025-05-24 | Stop reason: HOSPADM

## 2025-05-13 RX ORDER — AZITHROMYCIN 250 MG/1
250 TABLET, FILM COATED ORAL DAILY
Status: CANCELLED | OUTPATIENT
Start: 2025-05-14 | End: 2025-05-15

## 2025-05-13 RX ORDER — ALBUTEROL SULFATE 0.83 MG/ML
2.5 SOLUTION RESPIRATORY (INHALATION) EVERY 6 HOURS PRN
Status: DISCONTINUED | OUTPATIENT
Start: 2025-05-13 | End: 2025-05-22

## 2025-05-13 RX ORDER — GABAPENTIN 400 MG/1
1200 CAPSULE ORAL NIGHTLY
Status: DISCONTINUED | OUTPATIENT
Start: 2025-05-13 | End: 2025-05-24 | Stop reason: HOSPADM

## 2025-05-13 RX ORDER — SENNOSIDES A AND B 8.6 MG/1
2 TABLET, FILM COATED ORAL DAILY PRN
Status: DISCONTINUED | OUTPATIENT
Start: 2025-05-13 | End: 2025-05-24 | Stop reason: HOSPADM

## 2025-05-13 RX ORDER — TRAZODONE HYDROCHLORIDE 50 MG/1
50 TABLET ORAL NIGHTLY PRN
Status: CANCELLED | OUTPATIENT
Start: 2025-05-13

## 2025-05-13 RX ORDER — ALBUTEROL SULFATE 90 UG/1
2 INHALANT RESPIRATORY (INHALATION) 4 TIMES DAILY PRN
Status: DISCONTINUED | OUTPATIENT
Start: 2025-05-13 | End: 2025-05-22

## 2025-05-13 RX ORDER — ALBUTEROL SULFATE 0.83 MG/ML
2.5 SOLUTION RESPIRATORY (INHALATION) EVERY 6 HOURS PRN
Status: CANCELLED | OUTPATIENT
Start: 2025-05-13

## 2025-05-13 RX ORDER — OXYCODONE AND ACETAMINOPHEN 5; 325 MG/1; MG/1
1 TABLET ORAL EVERY 6 HOURS PRN
Refills: 0 | Status: CANCELLED | OUTPATIENT
Start: 2025-05-13

## 2025-05-13 RX ORDER — BENZONATATE 100 MG/1
200 CAPSULE ORAL EVERY 8 HOURS
Status: CANCELLED | OUTPATIENT
Start: 2025-05-13

## 2025-05-13 RX ORDER — POLYETHYLENE GLYCOL 3350 17 G/17G
17 POWDER, FOR SOLUTION ORAL DAILY PRN
Status: CANCELLED | OUTPATIENT
Start: 2025-05-13

## 2025-05-13 RX ORDER — CETIRIZINE HYDROCHLORIDE 10 MG/1
5 TABLET ORAL DAILY
Status: CANCELLED | OUTPATIENT
Start: 2025-05-14

## 2025-05-13 RX ORDER — PREDNISONE 20 MG/1
20 TABLET ORAL DAILY
Status: COMPLETED | OUTPATIENT
Start: 2025-05-16 | End: 2025-05-18

## 2025-05-13 RX ORDER — ENOXAPARIN SODIUM 100 MG/ML
30 INJECTION SUBCUTANEOUS 2 TIMES DAILY
Status: DISCONTINUED | OUTPATIENT
Start: 2025-05-13 | End: 2025-05-23

## 2025-05-13 RX ORDER — ERGOCALCIFEROL 1.25 MG/1
50000 CAPSULE, LIQUID FILLED ORAL WEEKLY
Status: DISCONTINUED | OUTPATIENT
Start: 2025-05-14 | End: 2025-05-24 | Stop reason: HOSPADM

## 2025-05-13 RX ORDER — GABAPENTIN 400 MG/1
1200 CAPSULE ORAL NIGHTLY
Status: CANCELLED | OUTPATIENT
Start: 2025-05-13

## 2025-05-13 RX ORDER — GABAPENTIN 300 MG/1
900 CAPSULE ORAL
Status: CANCELLED | OUTPATIENT
Start: 2025-05-14

## 2025-05-13 RX ORDER — GABAPENTIN 300 MG/1
600 CAPSULE ORAL DAILY
Status: DISCONTINUED | OUTPATIENT
Start: 2025-05-14 | End: 2025-05-24 | Stop reason: HOSPADM

## 2025-05-13 RX ORDER — IBUPROFEN 600 MG/1
600 TABLET, FILM COATED ORAL EVERY 6 HOURS PRN
Status: DISCONTINUED | OUTPATIENT
Start: 2025-05-13 | End: 2025-05-24 | Stop reason: HOSPADM

## 2025-05-13 RX ORDER — PANTOPRAZOLE SODIUM 40 MG/1
40 TABLET, DELAYED RELEASE ORAL
Status: CANCELLED | OUTPATIENT
Start: 2025-05-14

## 2025-05-13 RX ADMIN — ASPIRIN 81 MG: 81 TABLET, CHEWABLE ORAL at 08:35

## 2025-05-13 RX ADMIN — ENOXAPARIN SODIUM 30 MG: 100 INJECTION SUBCUTANEOUS at 20:40

## 2025-05-13 RX ADMIN — SERTRALINE 100 MG: 50 TABLET, FILM COATED ORAL at 08:35

## 2025-05-13 RX ADMIN — BUTALBITAL, ACETAMINOPHEN, AND CAFFEINE 1 TABLET: 325; 50; 40 TABLET ORAL at 07:47

## 2025-05-13 RX ADMIN — BENZONATATE 200 MG: 100 CAPSULE ORAL at 17:21

## 2025-05-13 RX ADMIN — PREDNISONE 40 MG: 20 TABLET ORAL at 08:35

## 2025-05-13 RX ADMIN — AZITHROMYCIN DIHYDRATE 250 MG: 250 TABLET ORAL at 08:35

## 2025-05-13 RX ADMIN — SODIUM CHLORIDE, PRESERVATIVE FREE 10 ML: 5 INJECTION INTRAVENOUS at 08:34

## 2025-05-13 RX ADMIN — LEVETIRACETAM 1000 MG: 500 TABLET, FILM COATED ORAL at 08:35

## 2025-05-13 RX ADMIN — GABAPENTIN 900 MG: 300 CAPSULE ORAL at 11:06

## 2025-05-13 RX ADMIN — GABAPENTIN 1200 MG: 400 CAPSULE ORAL at 20:37

## 2025-05-13 RX ADMIN — OXYCODONE AND ACETAMINOPHEN 1 TABLET: 325; 5 TABLET ORAL at 18:39

## 2025-05-13 RX ADMIN — IPRATROPIUM BROMIDE AND ALBUTEROL SULFATE 1 DOSE: .5; 3 SOLUTION RESPIRATORY (INHALATION) at 08:08

## 2025-05-13 RX ADMIN — BENZONATATE 200 MG: 100 CAPSULE ORAL at 08:35

## 2025-05-13 RX ADMIN — LEVETIRACETAM 1000 MG: 500 TABLET, FILM COATED ORAL at 20:37

## 2025-05-13 RX ADMIN — GABAPENTIN 600 MG: 300 CAPSULE ORAL at 08:35

## 2025-05-13 RX ADMIN — GUAIFENESIN, DEXTROMETHORPHAN HBR 2 TABLET: 600; 30 TABLET ORAL at 21:06

## 2025-05-13 RX ADMIN — GUAIFENESIN, DEXTROMETHORPHAN HBR 2 TABLET: 600; 30 TABLET ORAL at 08:45

## 2025-05-13 RX ADMIN — ENOXAPARIN SODIUM 30 MG: 100 INJECTION SUBCUTANEOUS at 08:35

## 2025-05-13 RX ADMIN — OXYCODONE AND ACETAMINOPHEN 1 TABLET: 325; 5 TABLET ORAL at 11:06

## 2025-05-13 RX ADMIN — BENZONATATE 200 MG: 100 CAPSULE ORAL at 00:51

## 2025-05-13 ASSESSMENT — PAIN DESCRIPTION - LOCATION
LOCATION: HEAD
LOCATION: HIP;FOOT
LOCATION: GENERALIZED

## 2025-05-13 ASSESSMENT — PAIN SCALES - GENERAL
PAINLEVEL_OUTOF10: 8
PAINLEVEL_OUTOF10: 8
PAINLEVEL_OUTOF10: 7
PAINLEVEL_OUTOF10: 7
PAINLEVEL_OUTOF10: 2
PAINLEVEL_OUTOF10: 5
PAINLEVEL_OUTOF10: 8

## 2025-05-13 ASSESSMENT — PAIN DESCRIPTION - PAIN TYPE: TYPE: CHRONIC PAIN

## 2025-05-13 ASSESSMENT — PAIN DESCRIPTION - FREQUENCY: FREQUENCY: CONTINUOUS

## 2025-05-13 ASSESSMENT — PAIN DESCRIPTION - ORIENTATION: ORIENTATION: RIGHT;LEFT

## 2025-05-13 NOTE — PROGRESS NOTES
4 Eyes Skin Assessment     NAME:  Cely Ochoa  YOB: 1985  MEDICAL RECORD NUMBER:  [id number]    The patient is being assessed for  Admission    I agree that at least one RN has performed a thorough Head to Toe Skin Assessment on the patient. ALL assessment sites listed below have been assessed.      Areas assessed by both nurses:    Head, Face, Ears, Shoulders, Back, Chest, Arms, Elbows, Hands, Sacrum. Buttock, Coccyx, Ischium, Legs. Feet and Heels, and Under Medical Devices         Does the Patient have a Wound? No noted wound(s)       Todd Prevention initiated by RN: No  Wound Care Orders initiated by RN: No    Pressure Injury (Stage 3,4, Unstageable, DTI, NWPT, and Complex wounds) if present, place Wound referral order by RN under : No    New Ostomies, if present place, Ostomy referral order under : No     Nurse 1 eSignature: Electronically signed by Maribell Silva RN on 5/13/25 at 7:20 PM EDT    **SHARE this note so that the co-signing nurse can place an eSignature**    Nurse 2 eSignature: Electronically signed by Winter Olivia RN on 5/14/25 at 1:28 AM EDT

## 2025-05-13 NOTE — PLAN OF CARE
Problem: Safety - Adult  Goal: Free from fall injury  Outcome: Completed     Problem: Chronic Conditions and Co-morbidities  Goal: Patient's chronic conditions and co-morbidity symptoms are monitored and maintained or improved  Outcome: Completed     Problem: Discharge Planning  Goal: Discharge to home or other facility with appropriate resources  Outcome: Completed     Problem: Pain  Goal: Verbalizes/displays adequate comfort level or baseline comfort level  Outcome: Completed     Problem: Skin/Tissue Integrity  Goal: Skin integrity remains intact  Description: 1.  Monitor for areas of redness and/or skin breakdown2.  Assess vascular access sites hourly3.  Every 4-6 hours minimum:  Change oxygen saturation probe site4.  Every 4-6 hours:  If on nasal continuous positive airway pressure, respiratory therapy assess nares and determine need for appliance change or resting period  Outcome: Completed     Problem: Neurosensory - Adult  Goal: Achieves stable or improved neurological status  Outcome: Completed  Goal: Achieves maximal functionality and self care  Outcome: Completed     Problem: Infection - Adult  Goal: Absence of infection at discharge  Outcome: Completed     Problem: Musculoskeletal - Adult  Goal: Return mobility to safest level of function  Outcome: Completed     Problem: Respiratory - Adult  Goal: Achieves optimal ventilation and oxygenation  Outcome: Completed     Problem: Gastrointestinal - Adult  Goal: Minimal or absence of nausea and vomiting  Outcome: Completed  Goal: Maintains or returns to baseline bowel function  Outcome: Completed  Goal: Maintains adequate nutritional intake  Outcome: Completed     Problem: Genitourinary - Adult  Goal: Absence of urinary retention  Outcome: Completed

## 2025-05-13 NOTE — PROGRESS NOTES
Cely Ochoa  5/13/2025  [id number]    Chief Complaint: Acute left-sided weakness    Subjective   Since last seen, medical updates:  - Started on oral prednisone taper, short course of oral azithromycin.     Patient reports that she is doing well today.  She is breathing comfortably on room air.  She reports an ongoing cough which is now quite mild.  She continues to have weakness and sensation changes in her left arm and leg, but she is beginning to notice some improved strength.  She is feeling anxious.          Objective   Objective:  Patient Vitals for the past 24 hrs:   BP Temp Temp src Pulse Resp SpO2 Weight   05/13/25 0848 -- -- -- -- -- -- (!) 136.2 kg (300 lb 4.8 oz)   05/13/25 0808 -- -- -- 73 16 95 % --   05/13/25 0730 125/80 97.9 °F (36.6 °C) Temporal 72 18 94 % --   05/13/25 0415 126/85 98.6 °F (37 °C) Oral 68 16 95 % --   05/13/25 0045 (!) 140/84 98.5 °F (36.9 °C) Oral 80 16 93 % --   05/12/25 2045 -- -- -- 78 16 97 % --   05/12/25 2000 129/76 98.5 °F (36.9 °C) Oral 73 18 97 % --   05/12/25 1606 124/83 97.3 °F (36.3 °C) Temporal 85 -- 94 % --   05/12/25 1145 116/79 98.3 °F (36.8 °C) Temporal 90 18 95 % --   05/12/25 1117 -- -- -- 85 16 95 % --   05/12/25 1035 -- -- -- -- 18 -- --     Gen: No distress.   HEENT: Normocephalic, atraumatic.   CV: Extremities warm, perfused.  Resp: No respiratory distress. No increased WOB  Abd: Soft, nontender nondistended  Ext: No edema.   Neuro: Alert, fully oriented.  3/5 left upper extremity, 2/5 left lower extremity.   Psych: Calm, pleasant. Affect congruent with stated mood.     Laboratory data: Available via EMR.     Therapy progress:    PT    Rolling: Level of difficulty - A Little   Sit to Stand from a Chair: Level of difficulty - A Little  Supine to Sit: Level of difficulty - A Little     Bed to Chair: Physical Assistance Required - A Little  Ambulate Across Room: Physical Assistance Required - A Little  Ascend 3-5 Steps With HR: Physical Assistance Required - A

## 2025-05-13 NOTE — PROGRESS NOTES
Report given to ARU RN. Pt can come up after dinner at 1845 to room 4305. Per ARU RN PIV can be left in.

## 2025-05-13 NOTE — CARE COORDINATION
Discharge Planning:    CM was informed by John in ARU, that pt is approved for [facility] and will be going up today.  CM updated the nurse.    Electronically signed by Hayes Cruz on 5/13/2025 at 9:53 AM

## 2025-05-13 NOTE — PROGRESS NOTES
Speech Language Pathology  [facility] - Inpatient Rehabilitation Services  [phone]  SLP Dysphagia Treatment       Patient: Cely Ochoa   : 1985   MRN: [id number]      Evaluation Date: 2025      Admitting Dx: Facial droop [R29.810]  Acute left-sided weakness [R53.1]  Seizure-like activity (HCC) [R56.9]  Treatment Diagnosis: Oropharyngeal Dysphagia   Pain: Did not state                                  Recommendations      Recommended Diet and Intervention 2025:  Diet Solids Recommendation:  Regular texture diet  Liquid Consistency Recommendation:  Thin liquids  Recommended form of Meds: Meds whole with water or Meds in puree    Patient is tolerating current diet level and denies concerns with swallowing at this time. Patient is cognitively at baseline per spouse report.     Compensatory strategies: Aspiration Precautions , Alternate solids/liquids , Eat or Feed Slowly, Small Bites and Sips , Upright as possible with all PO intake     Discharge Recommendations:  No further follow-up appears indicated at this time.     History/Course of Treatment     H&P: Cely Ochoa is a 39 y.o. female with pmh of asthma, obesity, seizures who presents with shortness of breath and seizures.  Patient initially presented to the emergency room for shortness of breath and seizures.  Her spouse is at bedside and states since Thursday she has had some upper respiratory symptoms.  She denies any fever.  She had a coughing episode at home and felt very short of breath so they called EMS.  Patient then vomited and had a seizure.  Wife reports tonic clonic seizure.  She had 1st seizure at 5:30pm.  She then proceeded to have 3 additional seizures prior to getting the emergency room.  And 1 seizure in the emergency room that was unwitnessed by nurses..  Patient was postictal on presentation to ER.  When she started wake up spouse noticed left facial droop with left arm and leg weakness.  Wife does states she

## 2025-05-14 LAB
ANION GAP SERPL CALCULATED.3IONS-SCNC: 10 MMOL/L (ref 3–16)
ANISOCYTOSIS BLD QL SMEAR: ABNORMAL
BASOPHILS # BLD: 0 K/UL (ref 0–0.2)
BASOPHILS NFR BLD: 0 %
BUN SERPL-MCNC: 15 MG/DL (ref 7–20)
CALCIUM SERPL-MCNC: 8.3 MG/DL (ref 8.3–10.6)
CHLORIDE SERPL-SCNC: 103 MMOL/L (ref 99–110)
CO2 SERPL-SCNC: 22 MMOL/L (ref 21–32)
CREAT SERPL-MCNC: 0.7 MG/DL (ref 0.6–1.1)
DEPRECATED RDW RBC AUTO: 16.4 % (ref 12.4–15.4)
EOSINOPHIL # BLD: 0.4 K/UL (ref 0–0.6)
EOSINOPHIL NFR BLD: 2 %
GFR SERPLBLD CREATININE-BSD FMLA CKD-EPI: >90 ML/MIN/{1.73_M2}
GLUCOSE SERPL-MCNC: 78 MG/DL (ref 70–99)
HCT VFR BLD AUTO: 40.3 % (ref 36–48)
HGB BLD-MCNC: 13.3 G/DL (ref 12–16)
LYMPHOCYTES # BLD: 8.9 K/UL (ref 1–5.1)
LYMPHOCYTES NFR BLD: 48 %
MCH RBC QN AUTO: 26.3 PG (ref 26–34)
MCHC RBC AUTO-ENTMCNC: 33.1 G/DL (ref 31–36)
MCV RBC AUTO: 79.5 FL (ref 80–100)
MONOCYTES # BLD: 1.3 K/UL (ref 0–1.3)
MONOCYTES NFR BLD: 7 %
NEUTROPHILS # BLD: 8 K/UL (ref 1.7–7.7)
NEUTROPHILS NFR BLD: 43 %
PATH INTERP BLD-IMP: NORMAL
PATH INTERP BLD-IMP: YES
PLATELET # BLD AUTO: 302 K/UL (ref 135–450)
PLATELET BLD QL SMEAR: ADEQUATE
PMV BLD AUTO: 6.9 FL (ref 5–10.5)
POLYCHROMASIA BLD QL SMEAR: ABNORMAL
POTASSIUM SERPL-SCNC: 4.1 MMOL/L (ref 3.5–5.1)
RBC # BLD AUTO: 5.07 M/UL (ref 4–5.2)
SLIDE REVIEW: ABNORMAL
SODIUM SERPL-SCNC: 135 MMOL/L (ref 136–145)
WBC # BLD AUTO: 18.5 K/UL (ref 4–11)

## 2025-05-14 PROCEDURE — 6360000002 HC RX W HCPCS: Performed by: STUDENT IN AN ORGANIZED HEALTH CARE EDUCATION/TRAINING PROGRAM

## 2025-05-14 PROCEDURE — 80048 BASIC METABOLIC PNL TOTAL CA: CPT

## 2025-05-14 PROCEDURE — 97535 SELF CARE MNGMENT TRAINING: CPT

## 2025-05-14 PROCEDURE — 2500000003 HC RX 250 WO HCPCS: Performed by: STUDENT IN AN ORGANIZED HEALTH CARE EDUCATION/TRAINING PROGRAM

## 2025-05-14 PROCEDURE — 97165 OT EVAL LOW COMPLEX 30 MIN: CPT

## 2025-05-14 PROCEDURE — 85025 COMPLETE CBC W/AUTO DIFF WBC: CPT

## 2025-05-14 PROCEDURE — 97530 THERAPEUTIC ACTIVITIES: CPT

## 2025-05-14 PROCEDURE — 97116 GAIT TRAINING THERAPY: CPT

## 2025-05-14 PROCEDURE — 97112 NEUROMUSCULAR REEDUCATION: CPT

## 2025-05-14 PROCEDURE — 97161 PT EVAL LOW COMPLEX 20 MIN: CPT

## 2025-05-14 PROCEDURE — 1280000000 HC REHAB R&B

## 2025-05-14 PROCEDURE — 6370000000 HC RX 637 (ALT 250 FOR IP): Performed by: STUDENT IN AN ORGANIZED HEALTH CARE EDUCATION/TRAINING PROGRAM

## 2025-05-14 PROCEDURE — 97110 THERAPEUTIC EXERCISES: CPT

## 2025-05-14 RX ORDER — BUTALBITAL, ACETAMINOPHEN AND CAFFEINE 50; 325; 40 MG/1; MG/1; MG/1
1 TABLET ORAL EVERY 4 HOURS PRN
COMMUNITY

## 2025-05-14 RX ORDER — SODIUM CHLORIDE 0.9 % (FLUSH) 0.9 %
5-40 SYRINGE (ML) INJECTION 2 TIMES DAILY
Status: DISCONTINUED | OUTPATIENT
Start: 2025-05-14 | End: 2025-05-15

## 2025-05-14 RX ADMIN — GABAPENTIN 1200 MG: 400 CAPSULE ORAL at 20:03

## 2025-05-14 RX ADMIN — LEVETIRACETAM 1000 MG: 500 TABLET, FILM COATED ORAL at 20:03

## 2025-05-14 RX ADMIN — LEVETIRACETAM 1000 MG: 500 TABLET, FILM COATED ORAL at 08:58

## 2025-05-14 RX ADMIN — GUAIFENESIN, DEXTROMETHORPHAN HBR 2 TABLET: 600; 30 TABLET ORAL at 20:05

## 2025-05-14 RX ADMIN — BENZONATATE 200 MG: 100 CAPSULE ORAL at 01:34

## 2025-05-14 RX ADMIN — ENOXAPARIN SODIUM 30 MG: 100 INJECTION SUBCUTANEOUS at 20:03

## 2025-05-14 RX ADMIN — SENNOSIDES 17.2 MG: 8.6 TABLET, COATED ORAL at 20:03

## 2025-05-14 RX ADMIN — Medication 325 MG: at 08:58

## 2025-05-14 RX ADMIN — CETIRIZINE HYDROCHLORIDE 5 MG: 10 TABLET, FILM COATED ORAL at 08:57

## 2025-05-14 RX ADMIN — Medication 1000 MCG: at 08:58

## 2025-05-14 RX ADMIN — BENZONATATE 200 MG: 100 CAPSULE ORAL at 17:00

## 2025-05-14 RX ADMIN — ENOXAPARIN SODIUM 30 MG: 100 INJECTION SUBCUTANEOUS at 08:57

## 2025-05-14 RX ADMIN — SODIUM CHLORIDE, PRESERVATIVE FREE 10 ML: 5 INJECTION INTRAVENOUS at 20:04

## 2025-05-14 RX ADMIN — GABAPENTIN 600 MG: 300 CAPSULE ORAL at 08:57

## 2025-05-14 RX ADMIN — OXYCODONE HYDROCHLORIDE AND ACETAMINOPHEN 1 TABLET: 5; 325 TABLET ORAL at 20:03

## 2025-05-14 RX ADMIN — BENZONATATE 200 MG: 100 CAPSULE ORAL at 23:10

## 2025-05-14 RX ADMIN — SERTRALINE 100 MG: 50 TABLET, FILM COATED ORAL at 08:57

## 2025-05-14 RX ADMIN — SODIUM CHLORIDE, PRESERVATIVE FREE 10 ML: 5 INJECTION INTRAVENOUS at 02:45

## 2025-05-14 RX ADMIN — AZITHROMYCIN DIHYDRATE 250 MG: 250 TABLET ORAL at 08:57

## 2025-05-14 RX ADMIN — PANTOPRAZOLE SODIUM 40 MG: 40 TABLET, DELAYED RELEASE ORAL at 06:39

## 2025-05-14 RX ADMIN — GABAPENTIN 900 MG: 300 CAPSULE ORAL at 12:01

## 2025-05-14 RX ADMIN — PREDNISONE 40 MG: 20 TABLET ORAL at 08:57

## 2025-05-14 RX ADMIN — GUAIFENESIN, DEXTROMETHORPHAN HBR 2 TABLET: 600; 30 TABLET ORAL at 08:57

## 2025-05-14 RX ADMIN — BENZONATATE 200 MG: 100 CAPSULE ORAL at 08:57

## 2025-05-14 RX ADMIN — ERGOCALCIFEROL 50000 UNITS: 1.25 CAPSULE ORAL at 08:57

## 2025-05-14 RX ADMIN — SODIUM CHLORIDE, PRESERVATIVE FREE 10 ML: 5 INJECTION INTRAVENOUS at 08:58

## 2025-05-14 RX ADMIN — OXYCODONE HYDROCHLORIDE AND ACETAMINOPHEN 1 TABLET: 5; 325 TABLET ORAL at 10:42

## 2025-05-14 ASSESSMENT — PAIN SCALES - WONG BAKER: WONGBAKER_NUMERICALRESPONSE: HURTS LITTLE MORE

## 2025-05-14 ASSESSMENT — PAIN DESCRIPTION - ORIENTATION
ORIENTATION: RIGHT
ORIENTATION: RIGHT;LEFT
ORIENTATION: RIGHT;LEFT

## 2025-05-14 ASSESSMENT — PAIN SCALES - GENERAL
PAINLEVEL_OUTOF10: 8

## 2025-05-14 ASSESSMENT — PAIN DESCRIPTION - LOCATION
LOCATION: KNEE;HIP
LOCATION: GENERALIZED
LOCATION: GENERALIZED

## 2025-05-14 ASSESSMENT — PAIN DESCRIPTION - DESCRIPTORS
DESCRIPTORS: SORE;ACHING
DESCRIPTORS: ACHING
DESCRIPTORS: SORE

## 2025-05-14 ASSESSMENT — PAIN - FUNCTIONAL ASSESSMENT: PAIN_FUNCTIONAL_ASSESSMENT: ACTIVITIES ARE NOT PREVENTED

## 2025-05-14 NOTE — PROGRESS NOTES
Admission Period/Goal QM Codes for Cely Ocoha.    QM Admit Code Goal Code   Eating 5 6   Oral Hygiene 4 6   Toileting Hygiene 3 6   Shower/Bathing 3 6   UB Dressing 3 6   LB Dressing 3 6   Putting on/off Footwear 4 6   Rolling Left and Right 4 6   Sit To Lying 4 6   Lying to Sitting on Bedside 4 6   Sit to Stand 4 6   Chair/Bed to Chair Transfer 4 6   Toilet Transfers 4 6   Car Transfers 4 6   Walk 10 Feet 88 6   Walk 50 Feet with Two Turns 88 6   Walk 150 Feet 88 6   Walk 10 Feet on Uneven Surfaces 88 6   1 Step (Curb) 1 4   4 Steps 1 4   12 Steps 88 4   Picking up Object from Floor 88 6   Wheel 50 Feet with 2 Turns 4 6   Type Manual Manual   Wheel 150 Feet 4 6   Type Manual Manual       The above codes were determined by the treatment team to be the patient's accurate admission assessment codes based on assessment performed soon after the patient's admission and prior to the benefit of services provided by staff, or if appropriate, the patient's usual performance during the admission assessment period.    OT:  DEE Cardona OTR/L 5/15/25 953      PT:  Donta Vasquez, PT, DPT 5/16/2025 1127     RN:  Soheila Pate, 5/16/25, 1044     :  Smith Gimenez PT, DPT [id number]  5/16/25  11:56 AM

## 2025-05-14 NOTE — PROGRESS NOTES
Patient was admitted to room 4901 at 1855.  Patient was oriented to the Call Light, Phone, TV, Thermostat, Bed Controls, Bathroom and Emergency Cord.  Patient verbalized and demonstrated understanding of all.  Patient was also given an overview of Unit Routines for Acute Rehab, including the patient's rights and responsibilities as well as the CMS [employer] Privacy Act Statement providing notice of data collection.  Patient states that their normal bowel regime is daily. Meal times were explained, including how to order food.  The white board, (which is posted on the wall by the door is used for communication) has the Therapy Scheduled that is posted each day along with the name of your doctor, nurse, and therapist for your convenience.  We recommend any family that will be care givers or any care givers the patient has, take part in therapy.  We have no set visiting hours, we suggest non-caregiver friends and family visitors come after therapy (at 4 PM or later) to allow patient to rest in between sessions.      In conjunction with the patient and patient’s family, this nurse worked to establish a tailored Fall TIPS plan to ensure patient safety and compliance:    Falls TIPS Completion    Patient identified as increased risk for harm if fall:  [x] Yes     Fall Risks  History of Falls:    [x] Yes   Medication Side Effects:   [] Yes   Walking Aid:    [x] Yes   IV Pole or Equipment:   [] Yes   Unsteady Walk:     [x] Yes   May Forget or Choose Not to Call: [] Yes     Fall Interventions   Communicate Recent Fall and/or Risk of Harm: [x] Yes   Walking Aids:  Crutches: [] Yes   Cane: [] Yes   Walker: [] Yes   IV Assistance When Walking: [] Yes   Toileting Schedule: Every 4 Hours  Bedpan:   [] Yes   Assist to Commode: [x] Yes   Assist to Bathroom: [] Yes   Bed Alarm On: [x] Yes   Assistance Out of Bed:  Bedrest: [] Yes   1 Person: [x] Yes   2 People: [] Yes

## 2025-05-14 NOTE — PLAN OF CARE
Problem: Safety - Adult  Goal: Free from fall injury  5/14/2025 1023 by Janel Cohen RN  Outcome: Progressing  Note: Pt remains free from falls.  Safety precautions in place.  Bed in lowest position, bed/chair wheels locked, call light with in reach, bedside table in reach, bed/chair alarm on, fall risk wrist band on.

## 2025-05-14 NOTE — H&P
Patient: Cely Ochoa  [id number]  Date: 5/14/2025      Chief Complaint: Functional neurological disorder with left hemiparesis, hemisensory changes    History of Present Illness/Hospital Course:  Cely Ochoa is a 39 y.o. female with PMHx notable for depression, anxiety, meningitis, seizures, obesity who presented on 5/5/2025 with several days of upper respiratory symptoms, then on date of admission had an episode of emesis followed by what was described as a generalized motor seizure.  She had several other apparent seizure episodes, received Versed.  Neuro exam at that time was concerning for left facial droop, left arm and leg weakness. CT Head demonstrated no acute intracranial abnormality. CTA Head/Neck demonstrated no large vessel occlusion or flow-limiting stenosis. MRI Brain demonstrated no acute intracranial abnormality. TTE w/ normal LVEF, negative for PFO. Neurology consulted, performed EEG which was WNL, recommended continuing Keppra and gabapentin, outpatient neurology follow-up with possible need for admission to epilepsy monitoring unit. She was found to be positive for human metapneumovirus, also with acute asthma exacerbation which improved w/ steroids and azithromycin.     Currently patient reports that she is doing well.  She is feeling tired, both because she did not sleep well last night and because therapy was tiresome.  She continues to have left-sided weakness, but this is gradually improving.  She also reports abnormal sensation involving her left arm and leg.  She is feeling anxious, both regarding her current functional limitations, as well as in relation to some social stressors related to health of several of her family members. Last BM (including prior to admit): 05/14/25.     Prior Level of Function:  Independent for mobility, self-care, IADLs.  [place]     Current Level of Function:      Functional Deficits:  Left hemiparesis and  Diagnosis:   IGC: 13.0, Other Disabling Impairments    Assessment and Plan:  #. FND w/ left hemiparesis and sensory changes  - CT Head demonstrated no acute intracranial abnormality. CTA Head/Neck demonstrated no large vessel occlusion or flow-limiting stenosis. MRI Brain demonstrated no acute intracranial abnormality.  - Continue PT/OT  - Will benefit greatly from outpatient cognitive behavioral therapy    #. Hx of seizure disorder/PNES  - Continue Keppra  - Avoid Tramadol  - Needs outpatient follow-up with Neurology, EMU recommended.     #. Human metapneumovirus infection  #. Acute asthma exacerbation  - Supplemental oxygen to maintain SpO2 >92%  - Complete steroid taper, oral azithromycin.  Continue PPI while on steroids.  - Mucinex, Tessalon PRN  - Breathing treatments PRN  - CBC with persistent leukocytosis, likely reactive due to steroids. Continue to monitor until resolution.   - Off droplet precautions today    #. Iron deficiency  - Continue oral iron replacement    #. Headaches  - Fioricet PRN, discourage frequent use to minimize likelihood of rebound headaches    #. Chronic pain  - Continue Tylenol PRN for mild, ibuprofen PRN for moderate, Percocet 1 tab q6h PRN for severe (wean as able)  - Continue gabapentin 600/900/1200 mg TID    Chronic Conditions:  - Obesity: Provide education/counseling on association w/ adverse health outcomes, as well as management w/ dietary modification and exercise.    - Mood disorder: Continue sertraline 100 mg daily    CODE: Full Code  Diet: ADULT DIET; Regular  ADULT ORAL NUTRITION SUPPLEMENT; Breakfast, Lunch, Dinner; Standard High Calorie/High Protein Oral Supplement  Bowels: Per ARU protocol  Bladder: Bladder scans per ARU protocol  Sleep: Trazodone nightly PRN   DVT PPx: Lovenox  Dispo: TBD  Services: TBD  DME: TBD  Follow-ups: Neurology, PCP    Taiwo Park MD 5/14/2025, 8:28 AM     * This document was created using dictation software.  While all precautions were taken

## 2025-05-14 NOTE — PLAN OF CARE
ARU PATIENT TREATMENT PLAN  [facility]  [address] [place] [address]  [phone]      Cely Ochoa    : 1985  Acct #: [id number]  MRN: [id number]  PHYSICIAN:  Taiwo Park MD  Primary Problem    Patient Active Problem List   Diagnosis    Left foot drop    Pain in both feet    Pulmonary nodules    History of hemorrhagic cerebrovascular accident (CVA) with residual deficit    Current smoker    Moderate episode of recurrent major depressive disorder (HCC)    EVI (generalized anxiety disorder)    Allergy-induced asthma, mild intermittent, uncomplicated    Fluid retention in legs    Primary osteoarthritis involving multiple joints    BMI 45.0-49.9, adult (HCC)    Seizure disorder (HCC)    Chronic pain of both knees    Weakness of left upper extremity    Acute left-sided weakness    Seizure-like activity (HCC)    Acute bronchiolitis due to human metapneumovirus (hMPV)    Asthma exacerbation    Iron deficiency    Functional neurological symptom disorder with mixed symptoms       Rehabilitation Diagnosis:    IGC: 13.0, Other Disabling Impairments     Assessment and Plan:  #. FND w/ left hemiparesis and sensory changes  - CT Head demonstrated no acute intracranial abnormality. CTA Head/Neck demonstrated no large vessel occlusion or flow-limiting stenosis. MRI Brain demonstrated no acute intracranial abnormality.  - Continue PT/OT  - Will benefit greatly from outpatient cognitive behavioral therapy     #. Hx of seizure disorder/PNES  - Continue Keppra  - Avoid Tramadol  - Needs outpatient follow-up with Neurology, EMU recommended.      #. Human metapneumovirus infection  #. Acute asthma exacerbation  - Supplemental oxygen to maintain SpO2 >92%  - Complete steroid taper, oral azithromycin.  Continue PPI while on steroids.  - Mucinex, Tessalon PRN  - Breathing treatments PRN  - CBC with persistent leukocytosis, likely reactive due to steroids. Continue to monitor until resolution.  protocol ( 90 minutes with OT)       Therapy Treatments will include:  [x]  therapeutic exercises    [x]  gait training     [x]  neuromuscular re-ed                            [x]  transfer training             [] community reintegration    [x] bed mobility                          [x]  w/c mobility and training  [x]  self care    [x]home mgmt    []  cognitive training            []  energy conservation        []  dysphagia tx    []  speech/language/communication therapy   []  group therapy    [x]  patient/family education    [] Other:    CASE MANAGEMENT:  Goals:  Assist patient/family with discharge planning, patient/family counseling, and coordination with insurance during ARU stay.       Cely Ochoa will be seen a minimum of 3 hours of therapy per day, a minimum of 5 out of 7 days per week  (please see above for specific treatment plan per PT/OT/SLP).    [] In this rare instance due to the nature of this patient's medical involvement, this patient will be seen 15 hours per week (900 minutes within a 7 day period).    In addition, dietician/nutritionist may monitor calorie count as well as intake and collaboratively work with SLP on dietary upgrades.  Neuropsychology/Psychology may evaluate and provide necessary support.    Medical issues being managed closely and that require 24 hour availability of a physician:  [] Swallowing Precautions  [] Bowel/Bladder Fx  [] Weight bearing precautions  [] Wound Care    [x] Pain Mgmt   [x] Infection Protection  [x] DVT Prophylaxis   [x] Fall Precautions  [x] Fluid/Electrolyte/Nutrition Balance  [] Voice Protection   [x] Respiratory  [] Other:    Medical Prognosis: [] Good  [x] Fair    [] Guarded   Total expected IRF days: 10  Anticipated discharge destination:   [x] Home Independently   [] Home with supervision    []SNF     [] Other                                           Physician anticipated functional outcomes:  By discharge, the patient will progress towards prior

## 2025-05-14 NOTE — PROGRESS NOTES
ARU Admission Assessment    Ethnicity  \"Are you of , /a, or Swedish origin?\"  Check all that apply:  [x] A.  No, not of , /a, or Swedish Origin  [] B.  Yes, Tanzanian, Tanzanian American, Chicano/a  [] C.  Yes, Citizen of [place]  [] D.  Yes, Rafael  [] E.  Yes, another , , or Swedish origin  [] X.  Patient unable to respond  [] Y.  Patient declines to respond    Race  \"What is your race?\"  Check all that apply:  [] A.  White  [x] B.  Black or   [] C.   or   [] D.   Bruneian  [] E.  Chinese  [] F.  Japanese  [] G. Czech  [] H.  Slovak  [] I.  Kazakh  [] J.  Other   [] K.    [] L.  Tunisian or Linus  [] M.  Comoran  [] N.  Other   [] X.  Patient unable to respond  [] Y.  Patient declines to respond  [] Z.  None of the above    Language  A.  \"What is your preferred language?\"   English     B.  \"Do you need or want an  to communicate with a doctor or health care staff?\"  Check only one:  [x] 0.  No  [] 1.  Yes  [] 9.  Unable to determine    Transportation  \"Has lack of transportation kept you from medical appointments, meetings, work, or from getting things needed for daily living?\"Check all that apply:  [] A.  Yes, it has kept me from medical appointments or from getting my medications  [] B.  Yes, it has kept me from non-medical meetings, appointments, work, or from getting things that I need  [x] C.  No  [] X.  Patient unable to respond  [] Y.  Patient declines to respond    Hearing  Ability to hear (with hearing aid or hearing appliances if normally used)  [x]  0.  Adequate - no difficulty in normal conversation, social interaction, listening to TV  []  1.  Minimal difficulty - difficulty in some environments (e.g. when person speaks softly or setting is noisy)  []  2.  Moderate difficulty - speaker has to increase volume and speak distinctly   []  3.  Highly impaired - absence of

## 2025-05-14 NOTE — PROGRESS NOTES
[facility] - Inpatient Rehabilitation Department   Phone: [phone]    Physical Therapy    [x] Initial Evaluation            [] Daily Treatment Note         [] Discharge Summary      Patient: Cely Ochoa   : 1985   MRN: [id number]   Date of Service:  2025  Admitting Diagnosis: Functional neurological symptom disorder with mixed symptoms  Current Admission Summary: Cely Ochoa is a 39 y.o. female with PMHx notable for depression, anxiety, meningitis, seizures, obesity who presented on 2025 with several days of upper respiratory symptoms, then on date of admission had an episode of emesis followed by what was described as a generalized motor seizure.  She had several other apparent seizure episodes, received Versed.  Neuro exam at that time was concerning for left facial droop, left arm and leg weakness. CT Head demonstrated no acute intracranial abnormality. CTA Head/Neck demonstrated no large vessel occlusion or flow-limiting stenosis. MRI Brain demonstrated no acute intracranial abnormality. TTE w/ normal LVEF, negative for PFO. Neurology consulted, performed EEG which was WNL, recommended continuing Keppra and gabapentin, outpatient neurology follow-up with possible need for admission to epilepsy monitoring unit. She was found to be positive for human metapneumovirus, also with acute asthma exacerbation which improved w/ steroids and azithromycin.   Past Medical History:  has a past medical history of Anxiety, Asthma, Cerebrovascular disease, Depression, Left foot drop, Meningitis, Seizures (HCC), and Stroke (HCC).  Past Surgical History:  has a past surgical history that includes Tonsillectomy; skin biopsy; and Tooth Extraction (2019).  Discharge Recommendations: home with ongoing PT services (home vs outpt pending progress)   DME Required For Discharge: DME to be determined pending patient progress; new AFO  Precautions/Restrictions: high fall risk  Weight Bearing Restrictions:

## 2025-05-14 NOTE — PATIENT CARE CONFERENCE
[facility] Inpatient Rehabilitation Department  Weekly Team Conference Note    Patient Name: Cely Ochoa      MRN: [id number]  : 1985  (39 y.o.) Gender: female     The team conference for this patient was held on 5/15/2025 at 11:00 am and was led by: Taiwo Park MD     CASE MANAGEMENT:  Assessment: Patient is 39 year old female from home. Patient was admitted to ARU for Functional neurological symptom disorder with mixed symptoms. Discharge date is 25. Patient is alert and oriented x 4. She has a active insurance with a PCP in the community. Patient's ambulation is supported with moderate assistance and a wheelchair follow. Disposition is home with home healthcare. DME is TBD. SW will continue to follow therapy and Dr. Park recommendations and discuss closer to discharge.    PHYSICAL THERAPY    Current Status:  Bed Mobility:  Supine to Sit: stand by assistance  Sit to Supine: stand by assistance  Rolling Left: stand by assistance  Rolling Right: stand by assistance  Scooting: stand by assistance  Comments:Use of bed railings for rolling.  Use of momentum and hooked LEs to swing legs on/off the bed.  Transfers:  Sit to stand transfer: contact guard assistance  Stand to sit transfer: contact guard assistance  Stand pivot transfer: contact guard assistance  Bed to chair transfer: contact guard assistance, without an AD  Toilet transfer: contact guard assistance, with grab bar  Car transfer: contact guard assistance  Comments: Pt performs transfers quickly at times using momentum. Also assists her LLE fwd with use of her LUE.  Pt not always aware of her LLE positioning during transfers (ankle often supinated with WB on the lateral aspect).  Min A to correct positioning to improve safety.   Ambulation:  Surface:level surface  Assistive Device: willams rail  Other Appliance: wheelchair follow  Assistance: moderate assistance  Distance: 25 ft x 2 reps  Gait Mechanics: assist to

## 2025-05-14 NOTE — PROGRESS NOTES
Nutrition Note    RECOMMENDATIONS  PO Diet: Continue the current diet.   ONS: Discontinue the Ensure plus HP TID      ASSESSMENT   Admission nutrition assessment. Pt has a pmh of CVA, psychogenic nonepileptic events, asthma, obesity, seizures,  presented with shortness of breath, seizures and left sided weakness.  She is on a reg diet, reg texture and thin fluid per SLP.   Pt reported her appetite has been back to normal since last 2 days, 100% intake of breakfast today.  Wt hx review in the EMR indicates wt gain trends over the past 4-5 months, pt stated wt gain is likley r/t routinely taking high calorie snacks at night PTA. Pt has Ensure plus TID ordered at this admission, recommended discontinuing ONS, given good po intake and no need/goal for wt gain.  Pt is agreeable. Will cont to follow up.       Malnutrition Status: No malnutrition  Acute Illness  Findings of the 6 clinical characteristics of malnutrition:  Energy Intake:  Mild decrease in energy intake  Weight Loss:  No weight loss     Body Fat Loss:  No body fat loss     Muscle Mass Loss:  No muscle mass loss    Fluid Accumulation:  No fluid accumulation     Strength:  Not Performed      NUTRITION DIAGNOSIS   No nutrition diagnosis at this time related to   as evidenced by      Goals: Maintain adequate nutrition status, by next RD assessment     NUTRITION RELATED FINDINGS  Objective: LBM 5/14. Na+ 135, K+ 4.1. TSH: 0.81, A1C (5/6): 5.4, BLE trace edema.  Wounds: None    CURRENT NUTRITION THERAPIES  ADULT DIET; Regular  ADULT ORAL NUTRITION SUPPLEMENT; Breakfast, Lunch, Dinner; Standard High Calorie/High Protein Oral Supplement       PO Intake: %   PO Supplement Intake:%    ANTHROPOMETRICS  Current Height: 177.8 cm (5' 10\")  Current Weight - Scale: (!) 136.5 kg (300 lb 14.4 oz)      COMPARATIVE STANDARDS  Total Energy Requirements (kcals/day): 2131     Protein (g):         Fluid (mL/day):  2131    EDUCATION  Education/Counseling not

## 2025-05-14 NOTE — PROGRESS NOTES
[facility] - Inpatient Rehabilitation Department   Phone: [phone]    Occupational Therapy    [x] Initial Evaluation            [] Daily Treatment Note         [] Discharge Summary      Patient: Cely Ochoa   : 1985   MRN: [id number]   Date of Service:  2025    Admitting Diagnosis:  Functional neurological symptom disorder with mixed symptoms  Current Admission Summary:   Cely Ochoa is a 39 y.o. female with PMHx notable for depression, anxiety, meningitis, seizures, obesity who presented on 2025 with several days of upper respiratory symptoms, then on date of admission had an episode of emesis followed by what was described as a generalized motor seizure.  She had several other apparent seizure episodes, received Versed.  Neuro exam at that time was concerning for left facial droop, left arm and leg weakness. CT Head demonstrated no acute intracranial abnormality. CTA Head/Neck demonstrated no large vessel occlusion or flow-limiting stenosis. MRI Brain demonstrated no acute intracranial abnormality. TTE w/ normal LVEF, negative for PFO. Neurology consulted, performed EEG which was WNL, recommended continuing Keppra and gabapentin, outpatient neurology follow-up with possible need for admission to epilepsy monitoring unit. She was found to be positive for human metapneumovirus, also with acute asthma exacerbation which improved w/ steroids and azithromycin   Past Medical History:  has a past medical history of Anxiety, Asthma, Cerebrovascular disease, Depression, Left foot drop, Meningitis, Seizures (HCC), and Stroke (HCC).  Past Surgical History:  has a past surgical history that includes Tonsillectomy; skin biopsy; and Tooth Extraction (2019).    Discharge Recommendations: Home with HHOT     DME Required For Discharge: DME to be determined pending patient progress- LLE AFO     Precautions/Restrictions: high fall risk  Weight Bearing Restrictions: no restrictions     Required

## 2025-05-15 PROCEDURE — 6360000002 HC RX W HCPCS: Performed by: STUDENT IN AN ORGANIZED HEALTH CARE EDUCATION/TRAINING PROGRAM

## 2025-05-15 PROCEDURE — 97535 SELF CARE MNGMENT TRAINING: CPT

## 2025-05-15 PROCEDURE — 1280000000 HC REHAB R&B

## 2025-05-15 PROCEDURE — 6370000000 HC RX 637 (ALT 250 FOR IP): Performed by: STUDENT IN AN ORGANIZED HEALTH CARE EDUCATION/TRAINING PROGRAM

## 2025-05-15 PROCEDURE — 97112 NEUROMUSCULAR REEDUCATION: CPT

## 2025-05-15 PROCEDURE — 97530 THERAPEUTIC ACTIVITIES: CPT

## 2025-05-15 PROCEDURE — 97116 GAIT TRAINING THERAPY: CPT

## 2025-05-15 PROCEDURE — 97110 THERAPEUTIC EXERCISES: CPT

## 2025-05-15 RX ADMIN — BENZONATATE 200 MG: 100 CAPSULE ORAL at 16:57

## 2025-05-15 RX ADMIN — PREDNISONE 40 MG: 20 TABLET ORAL at 08:14

## 2025-05-15 RX ADMIN — GABAPENTIN 600 MG: 300 CAPSULE ORAL at 08:13

## 2025-05-15 RX ADMIN — SERTRALINE 100 MG: 50 TABLET, FILM COATED ORAL at 08:13

## 2025-05-15 RX ADMIN — Medication 1000 MCG: at 08:14

## 2025-05-15 RX ADMIN — ENOXAPARIN SODIUM 30 MG: 100 INJECTION SUBCUTANEOUS at 08:14

## 2025-05-15 RX ADMIN — OXYCODONE HYDROCHLORIDE AND ACETAMINOPHEN 1 TABLET: 5; 325 TABLET ORAL at 08:19

## 2025-05-15 RX ADMIN — GUAIFENESIN, DEXTROMETHORPHAN HBR 2 TABLET: 600; 30 TABLET ORAL at 20:22

## 2025-05-15 RX ADMIN — BENZONATATE 200 MG: 100 CAPSULE ORAL at 08:14

## 2025-05-15 RX ADMIN — OXYCODONE HYDROCHLORIDE AND ACETAMINOPHEN 1 TABLET: 5; 325 TABLET ORAL at 15:24

## 2025-05-15 RX ADMIN — PANTOPRAZOLE SODIUM 40 MG: 40 TABLET, DELAYED RELEASE ORAL at 06:20

## 2025-05-15 RX ADMIN — LEVETIRACETAM 1000 MG: 500 TABLET, FILM COATED ORAL at 20:22

## 2025-05-15 RX ADMIN — LEVETIRACETAM 1000 MG: 500 TABLET, FILM COATED ORAL at 08:13

## 2025-05-15 RX ADMIN — GABAPENTIN 900 MG: 300 CAPSULE ORAL at 11:54

## 2025-05-15 RX ADMIN — ENOXAPARIN SODIUM 30 MG: 100 INJECTION SUBCUTANEOUS at 20:22

## 2025-05-15 RX ADMIN — Medication 325 MG: at 08:14

## 2025-05-15 RX ADMIN — CETIRIZINE HYDROCHLORIDE 5 MG: 10 TABLET, FILM COATED ORAL at 08:14

## 2025-05-15 RX ADMIN — GUAIFENESIN, DEXTROMETHORPHAN HBR 2 TABLET: 600; 30 TABLET ORAL at 08:13

## 2025-05-15 RX ADMIN — GABAPENTIN 1200 MG: 400 CAPSULE ORAL at 20:22

## 2025-05-15 ASSESSMENT — PAIN - FUNCTIONAL ASSESSMENT
PAIN_FUNCTIONAL_ASSESSMENT: ACTIVITIES ARE NOT PREVENTED
PAIN_FUNCTIONAL_ASSESSMENT: ACTIVITIES ARE NOT PREVENTED

## 2025-05-15 ASSESSMENT — PAIN DESCRIPTION - ONSET: ONSET: ON-GOING

## 2025-05-15 ASSESSMENT — PAIN DESCRIPTION - ORIENTATION
ORIENTATION: RIGHT;LEFT
ORIENTATION: RIGHT;LEFT

## 2025-05-15 ASSESSMENT — PAIN DESCRIPTION - LOCATION
LOCATION: GENERALIZED
LOCATION: HIP
LOCATION: HIP

## 2025-05-15 ASSESSMENT — PAIN SCALES - GENERAL
PAINLEVEL_OUTOF10: 6
PAINLEVEL_OUTOF10: 6
PAINLEVEL_OUTOF10: 7
PAINLEVEL_OUTOF10: 5
PAINLEVEL_OUTOF10: 3
PAINLEVEL_OUTOF10: 6

## 2025-05-15 ASSESSMENT — PAIN DESCRIPTION - PAIN TYPE
TYPE: CHRONIC PAIN
TYPE: CHRONIC PAIN

## 2025-05-15 ASSESSMENT — PAIN DESCRIPTION - DESCRIPTORS
DESCRIPTORS: ACHING
DESCRIPTORS: SORE;ACHING
DESCRIPTORS: ACHING

## 2025-05-15 ASSESSMENT — PAIN DESCRIPTION - FREQUENCY: FREQUENCY: CONTINUOUS

## 2025-05-15 NOTE — PROGRESS NOTES
[facility] - Inpatient Rehabilitation Department   Phone: [phone]    Occupational Therapy    [] Initial Evaluation            [x] Daily Treatment Note         [] Discharge Summary      Patient: Cely Ochoa   : 1985   MRN: [id number]   Date of Service:  5/15/2025    Admitting Diagnosis:  Functional neurological symptom disorder with mixed symptoms  Current Admission Summary:   Cely Ochoa is a 39 y.o. female with PMHx notable for depression, anxiety, meningitis, seizures, obesity who presented on 2025 with several days of upper respiratory symptoms, then on date of admission had an episode of emesis followed by what was described as a generalized motor seizure.  She had several other apparent seizure episodes, received Versed.  Neuro exam at that time was concerning for left facial droop, left arm and leg weakness. CT Head demonstrated no acute intracranial abnormality. CTA Head/Neck demonstrated no large vessel occlusion or flow-limiting stenosis. MRI Brain demonstrated no acute intracranial abnormality. TTE w/ normal LVEF, negative for PFO. Neurology consulted, performed EEG which was WNL, recommended continuing Keppra and gabapentin, outpatient neurology follow-up with possible need for admission to epilepsy monitoring unit. She was found to be positive for human metapneumovirus, also with acute asthma exacerbation which improved w/ steroids and azithromycin   Past Medical History:  has a past medical history of Anxiety, Asthma, Cerebrovascular disease, Depression, Left foot drop, Meningitis, Seizures (HCC), and Stroke (HCC).  Past Surgical History:  has a past surgical history that includes Tonsillectomy; skin biopsy; and Tooth Extraction (2019).    Discharge Recommendations: Home with HHOT     DME Required For Discharge: DME to be determined pending patient progress- LLE AFO     Precautions/Restrictions: high fall risk  Weight Bearing Restrictions: no restrictions     Required  Pt typically independent with ADLs and mobility with no device. Pt progressing to CGA for toileting and able to complete mobility with min-modA of 1. Pt primarily limited by L sided weakness and pain. Pt will benefit from continued OT services to address above deficits and maximize safety and independence.   Safety Interventions: patient left in chair, chair alarm in place, and call light within reach    Plan  Frequency: 5 x/week, 90 min/day  Current Treatment Recommendations: strengthening, ROM, balance training, functional mobility training, transfer training, endurance training, neuromuscular re-education, patient/caregiver education, ADL/self-care training, and IADL training    Goals  Patient Goals: to be able to do stairs    Short Term Goals:  Time Frame: 10 days   Patient will complete upper body ADL at modified independent   Patient will complete lower body ADL at modified independent   Patient will complete toileting at modified independent   Patient will complete functional transfers at modified independent   Patient to gather and transport items at modified independent     Above goals reviewed on 5/15/2025.  All goals are ongoing at this time unless indicated above.       Therapy Session Time  First Session Therapy Time:   Individual Concurrent Group Co-treatment   Time In      835   Time Out      930   Minutes      55      Second Session Therapy Time:   Individual Concurrent Group Co-treatment   Time In 1003         Time Out 1103         Minutes 60           Timed Code Treatment Minutes:  55+ 60 minutes     Total Treatment Minutes:   115 minutes        Electronically Signed By:   First session:DANA Antoine/BRIANNA, CNS ([id number]) 5/15/2025 12:23 PM  Second session: DEE Cardona OTR/L [id number]

## 2025-05-15 NOTE — PROGRESS NOTES
Cely Ochoa  5/15/2025  [id number]    Chief Complaint: Functional neurological symptom disorder with mixed symptoms    Subjective    No acute events overnight.     Patient reports that she is doing well. She continues to have bilateral hip pain, rated as severe, for which she is still receiving oxycodone. Discussed trying Tylenol + ibuprofen for this pain. She is also having left neck/shoulder pain radiating to her fingers on the left hand. Left sided strength improving. Last Bowel Movement:  05/14/25          Objective    Patient Vitals for the past 24 hrs:   BP Temp Temp src Pulse Resp SpO2 Height   05/14/25 2033 -- -- -- -- 16 -- --   05/14/25 1945 (!) 147/74 97.4 °F (36.3 °C) Oral 88 17 96 % --   05/14/25 1144 -- -- -- -- -- -- 1.778 m (5' 10\")   05/14/25 0853 135/89 98.4 °F (36.9 °C) Oral 86 16 97 % --     Patient Vitals for the past 96 hrs (Last 3 readings):   Weight   05/13/25 2030 (!) 136.5 kg (300 lb 14.4 oz)      Gen: No distress.  HEENT: Normocephalic, atraumatic.   CV: Extremities warm, well perfused.   Resp: No respiratory distress.  Abd: Soft, nontender.  Ext: No edema.   MSK: + Spurling's on left.   Neuro: Alert, oriented, appropriately interactive.     Laboratory data:   Na/K/Cl/CO2:  135/4.1/103/22 (05/14 0640)   BUN/Cr/glu/ALT/AST/amyl/lip:  15/0.7/--/--/--/--/-- (05/14 0640)    WBC/Hgb/Hct/Plts:  18.5/13.3/40.3/302 (05/14 0640)     Therapy progress:       PT    Supine to Sit: Supervision or touching assistance  Sit to Supine: Supervision or touching assistance   Sit to Stand: Supervision or touching assistance  Chair/Bed to Chair Transfer: Supervision or touching assistance  Car Transfer: Supervision or touching assistance  Ambulation 10 ft:    Ambulation 50 ft:    Ambulation 150 ft:    Stairs - 1 Step: Dependent  Stairs - 4 Step: Dependent  Stairs - 12 Step:      OT    Eating: Independent  Oral Hygiene: Supervision or touching assistance  Bathing: Partial/moderate assistance  Upper Body  Bladder scans per ARU protocol  Sleep: Trazodone nightly PRN   DVT PPx: Lovenox  Dispo: Home on 5/24  Services: TBD  DME: TBD  Follow-ups: Neurology, PCP      Interdisciplinary team conference was held today with entire rehab treatment team including PT, OT, SLP (if applicable), Dietician, RN, and SW. Discussion focused on medical conditions, functional status, progress toward rehab goals, and discharge planning. Barriers include uncontrolled pain, frequent lab monitoring for leukocytosis, anxiety; Requires ongoing PT/OT/ST at inpatient level,, Insufficient functional progress for safe discharge,, and Requires additional prosthetic/orthotic fitting. We as a medical team, and I as the physician , made a plan to work on these barriers to facilitate safe discharge. Plan will be presented to patient/family (if available). More than 50% of the time was spent with medical decision making which was of high complexity for this patient. This includes preparing to see the patient by reviewing documentation and recent events with nursing team, obtaining/reviewing/updating the medical history, performing a medically appropriate exam, ordering medication and changing orders as indicated. The medical decision making was high for this encounter because of the number and complexity of comorbidities addressed, the complexity of the data that was reviewed/analyzed, and the complexity of the decisions made on further management. This data included vital signs, relevant labs, diagnostic tests, and documentation of consultants. It also included discussion and interpretation for nursing and therapy team about how these issues affect patient's functional progress. For this patient, the risk of complications is high due to number of active problems and contributing comorbidities and how they impact the patient's rehabilitation progress and discharge planning.     Taiwo Park MD 5/15/2025, 8:07 AM    * This document was

## 2025-05-15 NOTE — PLAN OF CARE
Problem: Chronic Conditions and Co-morbidities  Goal: Patient's chronic conditions and co-morbidity symptoms are monitored and maintained or improved  5/15/2025 0834 by Soheila Pate RN  Outcome: Progressing  5/15/2025 0053 by Maribell Silva RN  Outcome: Progressing  Flowsheets (Taken 5/15/2025 0053)  Care Plan - Patient's Chronic Conditions and Co-Morbidity Symptoms are Monitored and Maintained or Improved: Monitor and assess patient's chronic conditions and comorbid symptoms for stability, deterioration, or improvement     Problem: Discharge Planning  Goal: Discharge to home or other facility with appropriate resources  5/15/2025 0834 by Soheila Pate RN  Outcome: Progressing  5/15/2025 0053 by Maribell Silva RN  Outcome: Progressing  Flowsheets (Taken 5/15/2025 0053)  Discharge to home or other facility with appropriate resources: Identify barriers to discharge with patient and caregiver     Problem: Pain  Goal: Verbalizes/displays adequate comfort level or baseline comfort level  5/15/2025 0834 by Soheila Pate RN  Outcome: Progressing  Flowsheets (Taken 5/15/2025 0809)  Verbalizes/displays adequate comfort level or baseline comfort level:   Encourage patient to monitor pain and request assistance   Assess pain using appropriate pain scale   Administer analgesics based on type and severity of pain and evaluate response   Implement non-pharmacological measures as appropriate and evaluate response   Consider cultural and social influences on pain and pain management  5/15/2025 0053 by Maribell Silva RN  Outcome: Progressing  Flowsheets  Taken 5/15/2025 0053  Verbalizes/displays adequate comfort level or baseline comfort level: Encourage patient to monitor pain and request assistance  Taken 5/14/2025 1945  Verbalizes/displays adequate comfort level or baseline comfort level: Encourage patient to monitor pain and request assistance     Problem: Safety - Adult  Goal: Free from fall

## 2025-05-15 NOTE — PROGRESS NOTES
[facility] - Inpatient Rehabilitation Department   Phone: [phone]    Physical Therapy    [] Initial Evaluation            [x] Daily Treatment Note         [] Discharge Summary      Patient: Cely Ochoa   : 1985   MRN: [id number]   Date of Service:  5/15/2025  Admitting Diagnosis: Functional neurological symptom disorder with mixed symptoms  Current Admission Summary: Cely Ochoa is a 39 y.o. female with PMHx notable for depression, anxiety, meningitis, seizures, obesity who presented on 2025 with several days of upper respiratory symptoms, then on date of admission had an episode of emesis followed by what was described as a generalized motor seizure.  She had several other apparent seizure episodes, received Versed.  Neuro exam at that time was concerning for left facial droop, left arm and leg weakness. CT Head demonstrated no acute intracranial abnormality. CTA Head/Neck demonstrated no large vessel occlusion or flow-limiting stenosis. MRI Brain demonstrated no acute intracranial abnormality. TTE w/ normal LVEF, negative for PFO. Neurology consulted, performed EEG which was WNL, recommended continuing Keppra and gabapentin, outpatient neurology follow-up with possible need for admission to epilepsy monitoring unit. She was found to be positive for human metapneumovirus, also with acute asthma exacerbation which improved w/ steroids and azithromycin.   Past Medical History:  has a past medical history of Anxiety, Asthma, Cerebrovascular disease, Depression, Left foot drop, Meningitis, Seizures (HCC), and Stroke (HCC).  Past Surgical History:  has a past surgical history that includes Tonsillectomy; skin biopsy; and Tooth Extraction (2019).  Discharge Recommendations: home with ongoing PT services (home vs outpt pending progress)   DME Required For Discharge: DME to be determined pending patient progress; new AFO  Precautions/Restrictions: high fall risk  Weight Bearing Restrictions:

## 2025-05-15 NOTE — PLAN OF CARE
Problem: Chronic Conditions and Co-morbidities  Goal: Patient's chronic conditions and co-morbidity symptoms are monitored and maintained or improved  Outcome: Progressing  Flowsheets (Taken 5/15/2025 0053)  Care Plan - Patient's Chronic Conditions and Co-Morbidity Symptoms are Monitored and Maintained or Improved: Monitor and assess patient's chronic conditions and comorbid symptoms for stability, deterioration, or improvement     Problem: Discharge Planning  Goal: Discharge to home or other facility with appropriate resources  Outcome: Progressing  Flowsheets (Taken 5/15/2025 0053)  Discharge to home or other facility with appropriate resources: Identify barriers to discharge with patient and caregiver     Problem: Pain  Goal: Verbalizes/displays adequate comfort level or baseline comfort level  Outcome: Progressing  Flowsheets  Taken 5/15/2025 0053  Verbalizes/displays adequate comfort level or baseline comfort level: Encourage patient to monitor pain and request assistance  Taken 5/14/2025 1945  Verbalizes/displays adequate comfort level or baseline comfort level: Encourage patient to monitor pain and request assistance     Problem: Safety - Adult  Goal: Free from fall injury  Outcome: Progressing  Flowsheets (Taken 5/15/2025 0053)  Free From Fall Injury: Instruct family/caregiver on patient safety

## 2025-05-15 NOTE — PATIENT CARE CONFERENCE
Team conference/Weekly Summary         Team conference held today. Patient is 39 year old female from home with spouse. Patient was admitted to ARU for Functional neurological symptom disorder with mixed symptoms. Discharge date is 5/24/25. Patient is alert and oriented x 4. She has a active insurance with a PCP in the community. Patient's ambulation is supported with moderate assistance and a wheelchair follow. Disposition is home with home healthcare. DME is TBD. SW will continue to follow therapy and Dr. Park recommendations and discuss closer to discharge.     Patient choice for [facility] are: [facility], [facility] and [facility]. Referrals faxed. SW to follow up.   Electronically signed by JIAN Lewis on 5/15/25 at 4:50 PM EDT

## 2025-05-16 LAB
ANION GAP SERPL CALCULATED.3IONS-SCNC: 11 MMOL/L (ref 3–16)
BASOPHILS # BLD: 0 K/UL (ref 0–0.2)
BASOPHILS NFR BLD: 0 %
BUN SERPL-MCNC: 12 MG/DL (ref 7–20)
CALCIUM SERPL-MCNC: 8.5 MG/DL (ref 8.3–10.6)
CHLORIDE SERPL-SCNC: 104 MMOL/L (ref 99–110)
CO2 SERPL-SCNC: 21 MMOL/L (ref 21–32)
CREAT SERPL-MCNC: 0.7 MG/DL (ref 0.6–1.1)
DEPRECATED RDW RBC AUTO: 17 % (ref 12.4–15.4)
EOSINOPHIL # BLD: 0.2 K/UL (ref 0–0.6)
EOSINOPHIL NFR BLD: 1 %
GFR SERPLBLD CREATININE-BSD FMLA CKD-EPI: >90 ML/MIN/{1.73_M2}
GLUCOSE SERPL-MCNC: 76 MG/DL (ref 70–99)
HCT VFR BLD AUTO: 38.1 % (ref 36–48)
HGB BLD-MCNC: 12.3 G/DL (ref 12–16)
LYMPHOCYTES # BLD: 6.8 K/UL (ref 1–5.1)
LYMPHOCYTES NFR BLD: 36 %
MCH RBC QN AUTO: 26.8 PG (ref 26–34)
MCHC RBC AUTO-ENTMCNC: 32.3 G/DL (ref 31–36)
MCV RBC AUTO: 83 FL (ref 80–100)
MONOCYTES # BLD: 1.3 K/UL (ref 0–1.3)
MONOCYTES NFR BLD: 8 %
NEUTROPHILS # BLD: 7.5 K/UL (ref 1.7–7.7)
NEUTROPHILS NFR BLD: 48 %
PLATELET # BLD AUTO: 225 K/UL (ref 135–450)
PLATELET BLD QL SMEAR: ADEQUATE
PMV BLD AUTO: 7.6 FL (ref 5–10.5)
POTASSIUM SERPL-SCNC: 4.2 MMOL/L (ref 3.5–5.1)
RBC # BLD AUTO: 4.59 M/UL (ref 4–5.2)
RBC MORPH BLD: NORMAL
SLIDE REVIEW: ABNORMAL
SODIUM SERPL-SCNC: 136 MMOL/L (ref 136–145)
VARIANT LYMPHS NFR BLD MANUAL: 7 % (ref 0–6)
WBC # BLD AUTO: 15.7 K/UL (ref 4–11)

## 2025-05-16 PROCEDURE — 6370000000 HC RX 637 (ALT 250 FOR IP): Performed by: STUDENT IN AN ORGANIZED HEALTH CARE EDUCATION/TRAINING PROGRAM

## 2025-05-16 PROCEDURE — 97530 THERAPEUTIC ACTIVITIES: CPT

## 2025-05-16 PROCEDURE — 80048 BASIC METABOLIC PNL TOTAL CA: CPT

## 2025-05-16 PROCEDURE — 97535 SELF CARE MNGMENT TRAINING: CPT

## 2025-05-16 PROCEDURE — 97110 THERAPEUTIC EXERCISES: CPT

## 2025-05-16 PROCEDURE — 85025 COMPLETE CBC W/AUTO DIFF WBC: CPT

## 2025-05-16 PROCEDURE — 6360000002 HC RX W HCPCS: Performed by: STUDENT IN AN ORGANIZED HEALTH CARE EDUCATION/TRAINING PROGRAM

## 2025-05-16 PROCEDURE — 1280000000 HC REHAB R&B

## 2025-05-16 PROCEDURE — 36415 COLL VENOUS BLD VENIPUNCTURE: CPT

## 2025-05-16 PROCEDURE — 97116 GAIT TRAINING THERAPY: CPT

## 2025-05-16 RX ORDER — HYDROCHLOROTHIAZIDE 25 MG/1
12.5 TABLET ORAL DAILY
Status: DISCONTINUED | OUTPATIENT
Start: 2025-05-16 | End: 2025-05-24 | Stop reason: HOSPADM

## 2025-05-16 RX ORDER — BENZONATATE 100 MG/1
200 CAPSULE ORAL 3 TIMES DAILY PRN
Status: DISCONTINUED | OUTPATIENT
Start: 2025-05-16 | End: 2025-05-24 | Stop reason: HOSPADM

## 2025-05-16 RX ADMIN — OXYCODONE HYDROCHLORIDE AND ACETAMINOPHEN 1 TABLET: 5; 325 TABLET ORAL at 13:36

## 2025-05-16 RX ADMIN — PREDNISONE 20 MG: 20 TABLET ORAL at 08:40

## 2025-05-16 RX ADMIN — CETIRIZINE HYDROCHLORIDE 5 MG: 10 TABLET, FILM COATED ORAL at 08:40

## 2025-05-16 RX ADMIN — PANTOPRAZOLE SODIUM 40 MG: 40 TABLET, DELAYED RELEASE ORAL at 05:49

## 2025-05-16 RX ADMIN — ENOXAPARIN SODIUM 30 MG: 100 INJECTION SUBCUTANEOUS at 20:37

## 2025-05-16 RX ADMIN — GABAPENTIN 1200 MG: 400 CAPSULE ORAL at 20:37

## 2025-05-16 RX ADMIN — OXYCODONE HYDROCHLORIDE AND ACETAMINOPHEN 1 TABLET: 5; 325 TABLET ORAL at 07:14

## 2025-05-16 RX ADMIN — GABAPENTIN 600 MG: 300 CAPSULE ORAL at 08:40

## 2025-05-16 RX ADMIN — LEVETIRACETAM 1000 MG: 500 TABLET, FILM COATED ORAL at 08:39

## 2025-05-16 RX ADMIN — Medication 325 MG: at 08:40

## 2025-05-16 RX ADMIN — Medication 1000 MCG: at 08:40

## 2025-05-16 RX ADMIN — SERTRALINE 100 MG: 50 TABLET, FILM COATED ORAL at 08:40

## 2025-05-16 RX ADMIN — BENZONATATE 200 MG: 100 CAPSULE ORAL at 08:40

## 2025-05-16 RX ADMIN — OXYCODONE HYDROCHLORIDE AND ACETAMINOPHEN 1 TABLET: 5; 325 TABLET ORAL at 20:37

## 2025-05-16 RX ADMIN — LEVETIRACETAM 1000 MG: 500 TABLET, FILM COATED ORAL at 20:37

## 2025-05-16 RX ADMIN — HYDROCHLOROTHIAZIDE 12.5 MG: 25 TABLET ORAL at 11:32

## 2025-05-16 RX ADMIN — GABAPENTIN 900 MG: 300 CAPSULE ORAL at 11:32

## 2025-05-16 RX ADMIN — ENOXAPARIN SODIUM 30 MG: 100 INJECTION SUBCUTANEOUS at 08:40

## 2025-05-16 RX ADMIN — GUAIFENESIN, DEXTROMETHORPHAN HBR 2 TABLET: 600; 30 TABLET ORAL at 08:39

## 2025-05-16 ASSESSMENT — PAIN DESCRIPTION - ONSET
ONSET: ON-GOING
ONSET: ON-GOING

## 2025-05-16 ASSESSMENT — PAIN SCALES - GENERAL
PAINLEVEL_OUTOF10: 6
PAINLEVEL_OUTOF10: 3
PAINLEVEL_OUTOF10: 7
PAINLEVEL_OUTOF10: 8
PAINLEVEL_OUTOF10: 8
PAINLEVEL_OUTOF10: 6
PAINLEVEL_OUTOF10: 7

## 2025-05-16 ASSESSMENT — 9 HOLE PEG TEST
TESTTIME_SECONDS: 23.3
TESTTIME_SECONDS: 42.6

## 2025-05-16 ASSESSMENT — PAIN DESCRIPTION - DESCRIPTORS
DESCRIPTORS: ACHING
DESCRIPTORS: ACHING
DESCRIPTORS: THROBBING;ACHING

## 2025-05-16 ASSESSMENT — PAIN - FUNCTIONAL ASSESSMENT
PAIN_FUNCTIONAL_ASSESSMENT: ACTIVITIES ARE NOT PREVENTED

## 2025-05-16 ASSESSMENT — PAIN DESCRIPTION - PAIN TYPE
TYPE: CHRONIC PAIN
TYPE: CHRONIC PAIN

## 2025-05-16 ASSESSMENT — PAIN DESCRIPTION - LOCATION
LOCATION: GENERALIZED

## 2025-05-16 ASSESSMENT — PAIN DESCRIPTION - ORIENTATION: ORIENTATION: OTHER (COMMENT)

## 2025-05-16 ASSESSMENT — PAIN DESCRIPTION - FREQUENCY: FREQUENCY: CONTINUOUS

## 2025-05-16 NOTE — PROGRESS NOTES
OT    Eating: Independent  Oral Hygiene: Supervision or touching assistance  Bathing: Partial/moderate assistance  Upper Body Dressing: Partial/moderate assistance  Lower Body Dressing: Partial/moderate assistance  Toilet Transfer: Supervision or touching assistance  Toilet Hygiene: Supervision or touching assistance    Speech Therapy         Body mass index is 43.17 kg/m².        Assessment/Plan:  Functional progress: Improving left upper extremity strength.  This patient continues to require an ARU level of care from all disciplines to address the following issues:    #. FND w/ left hemiparesis and sensory changes  - CT Head demonstrated no acute intracranial abnormality. CTA Head/Neck demonstrated no large vessel occlusion or flow-limiting stenosis. MRI Brain demonstrated no acute intracranial abnormality.  - Continue PT/OT  - Will benefit greatly from outpatient cognitive behavioral therapy  - Continue to provide education on diagnosis     #. Hx of seizure disorder/PNES  - Continue Keppra  - Avoid Tramadol  - Needs outpatient follow-up with Neurology, EMU recommended.      #. Human metapneumovirus infection, resolved  #. Acute asthma exacerbation, improving  - Supplemental oxygen to maintain SpO2 >92%  - Complete steroid taper, oral azithromycin.  Continue PPI while on steroids.  - Mucinex, Tessalon PRN  - Breathing treatments PRN  - CBC with improving leukocytosis, likely reactive due to steroids. Continue to monitor until resolution.   - Off droplet precautions today     #. HTN  -  Resume home HCTZ    #. Iron deficiency  - Continue oral iron replacement     #. Headaches  - Fioricet PRN, discourage frequent use to minimize likelihood of rebound headaches     #. Chronic pain  - Continue Tylenol PRN for mild, ibuprofen PRN for moderate, Percocet 1 tab q6h PRN for severe (wean as able)  - Continue gabapentin 600/900/1200 mg TID     Chronic Conditions:  - Obesity: Provide education/counseling on association w/

## 2025-05-16 NOTE — PROGRESS NOTES
[facility] - Inpatient Rehabilitation Department   Phone: [phone]    Occupational Therapy    [] Initial Evaluation            [x] Daily Treatment Note         [] Discharge Summary      Patient: Cely Ochoa   : 1985   MRN: [id number]   Date of Service:  2025    Admitting Diagnosis:  Functional neurological symptom disorder with mixed symptoms  Current Admission Summary:   Cely Ochoa is a 39 y.o. female with PMHx notable for depression, anxiety, meningitis, seizures, obesity who presented on 2025 with several days of upper respiratory symptoms, then on date of admission had an episode of emesis followed by what was described as a generalized motor seizure.  She had several other apparent seizure episodes, received Versed.  Neuro exam at that time was concerning for left facial droop, left arm and leg weakness. CT Head demonstrated no acute intracranial abnormality. CTA Head/Neck demonstrated no large vessel occlusion or flow-limiting stenosis. MRI Brain demonstrated no acute intracranial abnormality. TTE w/ normal LVEF, negative for PFO. Neurology consulted, performed EEG which was WNL, recommended continuing Keppra and gabapentin, outpatient neurology follow-up with possible need for admission to epilepsy monitoring unit. She was found to be positive for human metapneumovirus, also with acute asthma exacerbation which improved w/ steroids and azithromycin   Past Medical History:  has a past medical history of Anxiety, Asthma, Cerebrovascular disease, Depression, Left foot drop, Meningitis, Seizures (HCC), and Stroke (HCC).  Past Surgical History:  has a past surgical history that includes Tonsillectomy; skin biopsy; and Tooth Extraction (2019).    Discharge Recommendations: Home with OT     DME Required For Discharge: [facility]     Precautions/Restrictions: high fall risk  Weight Bearing Restrictions: no restrictions     Required Braces/Orthotics: no braces required- has L AFO  but does not wear due to not fitting well/pain   Positional Restrictions:no positional restrictions    Pre-Admission Information   Lives With:  wife and 15 year old      Type of Home:  [facility]   Home Layout: tri-level, 10 steps to basement (no railing), 20 steps upstairs to bedroom/full bathroom (railing on R side).   Half bath on main level. Tries to limit going up and down stairs   Home Access:  3 step to enter without rails   Bathroom Layout: tub/shower unit  Bathroom Equipment: tub transfer bench  Toilet Height: standard height  Home Equipment: rolling walker, manual wheelchair, AFO but does not fit anymore (from 2020)   Transfer Assistance: Independent without use of device  Ambulation Assistance:Independent without use of device  ADL Assistance: independent with all ADL's  IADL Assistance: independent with homemaking tasks, share responsibilities with wife   Active :        [] Yes  [x] No  Hand Dominance: [x] Left  [x] Right  Current Employment: disabled  Hobbies: play with grandson, read, crosswords   Recent Falls: \"a lot\" trip or leg giving out at least once a week.    Available Assistance at Discharge: 24 hr supervision (non-physical) available    Examination   Vision:   Vision Corrective Device: wears glasses at all times  Hearing:   WFL      Subjective  General: Pt in  upon arrival, agreeable to OT session. Happy to get a shower. Reporting having custom AFO fitted yesterday and that it should be delivered by Friday the 23rd.    Pain: Patient does not rate upon questioning L shoulder   Pain Interventions: pain medication in place prior to arrival        Activities of Daily Living  Basic Activities of Daily Living  Feeding: Independent  Feeding Comments: left with breakfast tray   Grooming: modified independent supervision  Grooming Comments: pt reporting completing oral hygiene prior to session in . Pt applied lotion to BLE while seated on TTB with supervision   Upper Extremity Bathing:

## 2025-05-16 NOTE — PLAN OF CARE
Problem: Chronic Conditions and Co-morbidities  Goal: Patient's chronic conditions and co-morbidity symptoms are monitored and maintained or improved  Outcome: Progressing  Flowsheets (Taken 5/15/2025 2018)  Care Plan - Patient's Chronic Conditions and Co-Morbidity Symptoms are Monitored and Maintained or Improved:   Monitor and assess patient's chronic conditions and comorbid symptoms for stability, deterioration, or improvement   Collaborate with multidisciplinary team to address chronic and comorbid conditions and prevent exacerbation or deterioration     Problem: Discharge Planning  Goal: Discharge to home or other facility with appropriate resources  Outcome: Progressing  Flowsheets (Taken 5/15/2025 2018)  Discharge to home or other facility with appropriate resources:   Identify barriers to discharge with patient and caregiver   Refer to discharge planning if patient needs post-hospital services based on physician order or complex needs related to functional status, cognitive ability or social support system     Problem: Pain  Goal: Verbalizes/displays adequate comfort level or baseline comfort level  Outcome: Progressing     Problem: Safety - Adult  Goal: Free from fall injury  Outcome: Progressing     Problem: ABCDS Injury Assessment  Goal: Absence of physical injury  Outcome: Progressing

## 2025-05-16 NOTE — PROGRESS NOTES
SBA/supervision without use of UE support  Static Standing Balance: poor (+): requires min (A) to maintain balance  Dynamic Standing Balance: poor (+): requires min (A) to maintain balance  Comments:    Other Therapeutic Interventions  1st session: Pt performed functional mobility as noted above. A CE wrap donned on her left first to pull ankle into pronation and DF; used for all standing activities. At the ballet bar pt completed mini squats (BUE support) with green tband around her thighs and assistance to wt shift Left 3x10, light L knee hyperextension block.   Standing LLE toe taps onto 4 inch step with RUE support on railing, CGA-min A for balance with assist at LLE to clear step. 10 reps x2. RLE marches with single UE support on railing with therapist providing L knee hyperextension block and L lateral ankle block to prevent supination, x10 reps with pt fearful of activity due to increased WB on LLE; minimal RLE flexion ROM noted. Additional 10 reps x3 marches at ballet bar on both RLE and LLE with therapist providing L hyperextension block, assisting with weight shift onto LLE and cues for preventing compensatory motions at trunk/pelvis. Assist from therapist on LLE for full ROM. Improved R hip flexion ROM with BUE support.  Standing hip ABD on RLE/LLE with BUE support on ballet bar with emphasis on weight shifting onto LLE and prevention of compensatory motions. 10 reps x2  Wc>mat table transfer via SPT with CGA and VC's for L foot position awareness to prevent supination.   Lateral ball stability roll outs to L/R focusing on core control and LUE WB. Pt completing 20 reps x2 toward R/L with cues for focus on control with motion and not using momentum to compensate.  Mat>W/c transfer via SPT.   Pt assisted to toilet via SPT with CGA for safety, VC's for L foot positioning. CGA for standing balance while pt completed pants management in standing.   Pt assisted back to bed at end of session via SPT from w/c

## 2025-05-16 NOTE — PLAN OF CARE
Problem: Chronic Conditions and Co-morbidities  Goal: Patient's chronic conditions and co-morbidity symptoms are monitored and maintained or improved  5/16/2025 0940 by Soheila Pate RN  Outcome: Progressing  5/15/2025 2254 by Karol Kramer RN  Outcome: Progressing  Flowsheets (Taken 5/15/2025 2018)  Care Plan - Patient's Chronic Conditions and Co-Morbidity Symptoms are Monitored and Maintained or Improved:   Monitor and assess patient's chronic conditions and comorbid symptoms for stability, deterioration, or improvement   Collaborate with multidisciplinary team to address chronic and comorbid conditions and prevent exacerbation or deterioration     Problem: Discharge Planning  Goal: Discharge to home or other facility with appropriate resources  5/16/2025 0940 by Soheila Pate RN  Outcome: Progressing  5/15/2025 2254 by Karol Kramer RN  Outcome: Progressing  Flowsheets (Taken 5/15/2025 2018)  Discharge to home or other facility with appropriate resources:   Identify barriers to discharge with patient and caregiver   Refer to discharge planning if patient needs post-hospital services based on physician order or complex needs related to functional status, cognitive ability or social support system     Problem: Pain  Goal: Verbalizes/displays adequate comfort level or baseline comfort level  5/16/2025 0940 by Soheila Pate RN  Outcome: Progressing  Flowsheets (Taken 5/16/2025 0835)  Verbalizes/displays adequate comfort level or baseline comfort level:   Encourage patient to monitor pain and request assistance   Assess pain using appropriate pain scale   Administer analgesics based on type and severity of pain and evaluate response   Implement non-pharmacological measures as appropriate and evaluate response   Consider cultural and social influences on pain and pain management  5/15/2025 2254 by Karol Kramer RN  Outcome: Progressing     Problem: Safety - Adult  Goal: Free from  fall injury  5/16/2025 0940 by Soheila Pate RN  Outcome: Progressing  5/15/2025 2254 by Karol Kramer RN  Outcome: Progressing     Problem: ABCDS Injury Assessment  Goal: Absence of physical injury  5/16/2025 0940 by Soheila Pate RN  Outcome: Progressing  5/15/2025 2254 by Karol Kramer RN  Outcome: Progressing

## 2025-05-16 NOTE — PROGRESS NOTES
Assessments completed. Alert, oriented x4. Calls appropriately as needed, calm and cooperative with care. Transfers with CGA stand/pivot to wheelchair. Continent bladder thus far. In bed, alarm on, bed in lowest position, call light and table within reach. No further needs expressed at this time.

## 2025-05-17 ENCOUNTER — APPOINTMENT (OUTPATIENT)
Dept: GENERAL RADIOLOGY | Age: 40
DRG: 057 | End: 2025-05-17
Attending: STUDENT IN AN ORGANIZED HEALTH CARE EDUCATION/TRAINING PROGRAM
Payer: MEDICARE

## 2025-05-17 PROCEDURE — 97110 THERAPEUTIC EXERCISES: CPT

## 2025-05-17 PROCEDURE — 97530 THERAPEUTIC ACTIVITIES: CPT

## 2025-05-17 PROCEDURE — 1280000000 HC REHAB R&B

## 2025-05-17 PROCEDURE — 97535 SELF CARE MNGMENT TRAINING: CPT

## 2025-05-17 PROCEDURE — 6360000002 HC RX W HCPCS: Performed by: STUDENT IN AN ORGANIZED HEALTH CARE EDUCATION/TRAINING PROGRAM

## 2025-05-17 PROCEDURE — 97112 NEUROMUSCULAR REEDUCATION: CPT

## 2025-05-17 PROCEDURE — 6370000000 HC RX 637 (ALT 250 FOR IP): Performed by: STUDENT IN AN ORGANIZED HEALTH CARE EDUCATION/TRAINING PROGRAM

## 2025-05-17 PROCEDURE — 73600 X-RAY EXAM OF ANKLE: CPT

## 2025-05-17 RX ADMIN — Medication 1000 MCG: at 10:03

## 2025-05-17 RX ADMIN — Medication 325 MG: at 10:03

## 2025-05-17 RX ADMIN — SERTRALINE 100 MG: 50 TABLET, FILM COATED ORAL at 10:03

## 2025-05-17 RX ADMIN — PREDNISONE 20 MG: 20 TABLET ORAL at 10:03

## 2025-05-17 RX ADMIN — IBUPROFEN 600 MG: 600 TABLET ORAL at 14:51

## 2025-05-17 RX ADMIN — ENOXAPARIN SODIUM 30 MG: 100 INJECTION SUBCUTANEOUS at 10:02

## 2025-05-17 RX ADMIN — LEVETIRACETAM 1000 MG: 500 TABLET, FILM COATED ORAL at 10:03

## 2025-05-17 RX ADMIN — OXYCODONE HYDROCHLORIDE AND ACETAMINOPHEN 1 TABLET: 5; 325 TABLET ORAL at 17:07

## 2025-05-17 RX ADMIN — GABAPENTIN 600 MG: 300 CAPSULE ORAL at 10:03

## 2025-05-17 RX ADMIN — HYDROCHLOROTHIAZIDE 12.5 MG: 25 TABLET ORAL at 10:02

## 2025-05-17 RX ADMIN — OXYCODONE HYDROCHLORIDE AND ACETAMINOPHEN 1 TABLET: 5; 325 TABLET ORAL at 09:13

## 2025-05-17 RX ADMIN — LEVETIRACETAM 1000 MG: 500 TABLET, FILM COATED ORAL at 22:37

## 2025-05-17 RX ADMIN — ENOXAPARIN SODIUM 30 MG: 100 INJECTION SUBCUTANEOUS at 22:37

## 2025-05-17 RX ADMIN — GABAPENTIN 1200 MG: 400 CAPSULE ORAL at 22:37

## 2025-05-17 RX ADMIN — PANTOPRAZOLE SODIUM 40 MG: 40 TABLET, DELAYED RELEASE ORAL at 07:11

## 2025-05-17 RX ADMIN — CETIRIZINE HYDROCHLORIDE 5 MG: 10 TABLET, FILM COATED ORAL at 10:02

## 2025-05-17 RX ADMIN — GABAPENTIN 900 MG: 300 CAPSULE ORAL at 12:03

## 2025-05-17 RX ADMIN — IBUPROFEN 600 MG: 600 TABLET ORAL at 07:11

## 2025-05-17 RX ADMIN — IBUPROFEN 600 MG: 600 TABLET ORAL at 22:46

## 2025-05-17 ASSESSMENT — PAIN SCALES - GENERAL
PAINLEVEL_OUTOF10: 7
PAINLEVEL_OUTOF10: 8
PAINLEVEL_OUTOF10: 6
PAINLEVEL_OUTOF10: 4
PAINLEVEL_OUTOF10: 7
PAINLEVEL_OUTOF10: 5
PAINLEVEL_OUTOF10: 5

## 2025-05-17 ASSESSMENT — PAIN DESCRIPTION - ORIENTATION
ORIENTATION: OTHER (COMMENT)
ORIENTATION: LEFT

## 2025-05-17 ASSESSMENT — PAIN DESCRIPTION - PAIN TYPE: TYPE: ACUTE PAIN;CHRONIC PAIN

## 2025-05-17 ASSESSMENT — PAIN DESCRIPTION - DESCRIPTORS
DESCRIPTORS: THROBBING
DESCRIPTORS: THROBBING
DESCRIPTORS: ACHING
DESCRIPTORS: ACHING
DESCRIPTORS: THROBBING

## 2025-05-17 ASSESSMENT — PAIN - FUNCTIONAL ASSESSMENT
PAIN_FUNCTIONAL_ASSESSMENT: ACTIVITIES ARE NOT PREVENTED

## 2025-05-17 ASSESSMENT — PAIN DESCRIPTION - LOCATION
LOCATION: ANKLE
LOCATION: ANKLE;GENERALIZED
LOCATION: GENERALIZED

## 2025-05-17 ASSESSMENT — PAIN DESCRIPTION - FREQUENCY: FREQUENCY: CONTINUOUS

## 2025-05-17 ASSESSMENT — PAIN DESCRIPTION - ONSET: ONSET: ON-GOING

## 2025-05-17 NOTE — PROGRESS NOTES
[facility] - Inpatient Rehabilitation Department   Phone: [phone]    Physical Therapy    [] Initial Evaluation            [x] Daily Treatment Note         [] Discharge Summary      Patient: Cely Ochoa   : 1985   MRN: [id number]   Date of Service:  2025  Admitting Diagnosis: Functional neurological symptom disorder with mixed symptoms  Current Admission Summary: Cely Ochoa is a 39 y.o. female with PMHx notable for depression, anxiety, meningitis, seizures, obesity who presented on 2025 with several days of upper respiratory symptoms, then on date of admission had an episode of emesis followed by what was described as a generalized motor seizure.  She had several other apparent seizure episodes, received Versed.  Neuro exam at that time was concerning for left facial droop, left arm and leg weakness. CT Head demonstrated no acute intracranial abnormality. CTA Head/Neck demonstrated no large vessel occlusion or flow-limiting stenosis. MRI Brain demonstrated no acute intracranial abnormality. TTE w/ normal LVEF, negative for PFO. Neurology consulted, performed EEG which was WNL, recommended continuing Keppra and gabapentin, outpatient neurology follow-up with possible need for admission to epilepsy monitoring unit. She was found to be positive for human metapneumovirus, also with acute asthma exacerbation which improved w/ steroids and azithromycin.   Past Medical History:  has a past medical history of Anxiety, Asthma, Cerebrovascular disease, Depression, Left foot drop, Meningitis, Seizures (HCC), and Stroke (HCC).  Past Surgical History:  has a past surgical history that includes Tonsillectomy; skin biopsy; and Tooth Extraction (2019).  Discharge Recommendations: home with ongoing PT services (home vs outpt pending progress)   DME Required For Discharge: DME to be determined pending patient progress; new AFO  Precautions/Restrictions: high fall risk  Weight Bearing Restrictions:

## 2025-05-17 NOTE — PLAN OF CARE
Problem: Chronic Conditions and Co-morbidities  Goal: Patient's chronic conditions and co-morbidity symptoms are monitored and maintained or improved  Outcome: Progressing  Flowsheets (Taken 5/16/2025 2043)  Care Plan - Patient's Chronic Conditions and Co-Morbidity Symptoms are Monitored and Maintained or Improved:   Monitor and assess patient's chronic conditions and comorbid symptoms for stability, deterioration, or improvement   Collaborate with multidisciplinary team to address chronic and comorbid conditions and prevent exacerbation or deterioration   Update acute care plan with appropriate goals if chronic or comorbid symptoms are exacerbated and prevent overall improvement and discharge     Problem: Pain  Goal: Verbalizes/displays adequate comfort level or baseline comfort level  Outcome: Progressing  Flowsheets (Taken 5/16/2025 2027)  Verbalizes/displays adequate comfort level or baseline comfort level: Encourage patient to monitor pain and request assistance     Problem: Safety - Adult  Goal: Free from fall injury  Outcome: Progressing     Problem: ABCDS Injury Assessment  Goal: Absence of physical injury  Outcome: Progressing  Flowsheets (Taken 5/17/2025 0134)  Absence of Physical Injury: Implement safety measures based on patient assessment     Problem: Skin/Tissue Integrity  Goal: Skin integrity remains intact  Description: 1.  Monitor for areas of redness and/or skin breakdown2.  Assess vascular access sites hourly3.  Every 4-6 hours minimum:  Change oxygen saturation probe site4.  Every 4-6 hours:  If on nasal continuous positive airway pressure, respiratory therapy assess nares and determine need for appliance change or resting period  Outcome: Progressing  Flowsheets (Taken 5/16/2025 2043)  Skin Integrity Remains Intact: Monitor for areas of redness and/or skin breakdown

## 2025-05-17 NOTE — PROGRESS NOTES
[facility] - Inpatient Rehabilitation Department   Phone: [phone]    Occupational Therapy    [] Initial Evaluation            [x] Daily Treatment Note         [] Discharge Summary      Patient: Cely Ochoa   : 1985   MRN: [id number]   Date of Service:  2025    Admitting Diagnosis:  Functional neurological symptom disorder with mixed symptoms  Current Admission Summary:   Cely Ochoa is a 39 y.o. female with PMHx notable for depression, anxiety, meningitis, seizures, obesity who presented on 2025 with several days of upper respiratory symptoms, then on date of admission had an episode of emesis followed by what was described as a generalized motor seizure.  She had several other apparent seizure episodes, received Versed.  Neuro exam at that time was concerning for left facial droop, left arm and leg weakness. CT Head demonstrated no acute intracranial abnormality. CTA Head/Neck demonstrated no large vessel occlusion or flow-limiting stenosis. MRI Brain demonstrated no acute intracranial abnormality. TTE w/ normal LVEF, negative for PFO. Neurology consulted, performed EEG which was WNL, recommended continuing Keppra and gabapentin, outpatient neurology follow-up with possible need for admission to epilepsy monitoring unit. She was found to be positive for human metapneumovirus, also with acute asthma exacerbation which improved w/ steroids and azithromycin   Past Medical History:  has a past medical history of Anxiety, Asthma, Cerebrovascular disease, Depression, Left foot drop, Meningitis, Seizures (HCC), and Stroke (HCC).  Past Surgical History:  has a past surgical history that includes Tonsillectomy; skin biopsy; and Tooth Extraction (2019).    Discharge Recommendations: Home with OT     DME Required For Discharge: [facility]     Precautions/Restrictions: high fall risk  Weight Bearing Restrictions: no restrictions     Required Braces/Orthotics: no braces required- has L AFO  increased time. Pt able to advance L LE w/ increased time but Wiley for blocking L from inversion and CGA-Wiley for standing balance. Pt voided urine and passed BM while seated - marty care seated w/ SBA. Clothing management over/under hips while in stance w/ CGA. Sit><stand from toilet w/ RW w/ CGA. Hand hygiene in stance at sink w/ CGA for balance.     Functional mobility toilet>bed w/ min-modA x1 w/ increased time. Pt able to advance L LE w/ increased time but Wiley for blocking L from inversion and CGA-Wiley for standing balance.     TotalA to applying ace wrap in figure 8 pattern for edema and pain management of L ankle at EOS per pt request.    Pt left supine in bed at EOS w/ bed alarm, call light, and all other needs within reach.       Functional Outcomes                               Fine Motor Skills  Left 9 Hole Peg Test Time (secs): 42.6  Right 9 Hole Peg Test Time (secs): 23.3       Cognition  WFL  Orientation:    alert and oriented x 4  Command Following:   accurately follows one step commands     Education  Barriers To Learning: none  Patient Education: patient educated on goals, OT role and benefits, plan of care, ADL adaptive strategies, proper use of assistive device/equipment, transfer training, discharge recommendations  Learning Assessment:  patient verbalizes understanding, would benefit from continued reinforcement    Assessment  Activity Tolerance: limited by L ankle pain - limited to predominately seated exercises/activities   Impairments Requiring Therapeutic Intervention: decreased functional mobility, decreased ADL status, decreased ROM, decreased strength, decreased endurance, decreased sensation, decreased balance, decreased IADL, decreased fine motor control, decreased coordination, increased pain  Prognosis: good  Clinical Assessment: Pt working hard in therapy but limited this date d/t increased L ankle pain. Pt tolerated L shoulder A/AAROM and FM coordination activities this date w/

## 2025-05-17 NOTE — PROGRESS NOTES
Cely Ochoa  5/17/2025  [id number]    Chief Complaint: Functional neurological symptom disorder with mixed symptoms    Subjective    Seen in room this morning.  Patient had a left ankle rotation/sprain and physical therapy this morning while in parallel bars.  X-ray was negative.  Patient feels like she is sprained her ankle on her outside ligament.  She is going to continue therapy today and see how she feels.  Last Bowel Movement:  05/16/25          Objective    Patient Vitals for the past 24 hrs:   BP Temp Temp src Pulse Resp SpO2   05/17/25 0957 124/77 98.2 °F (36.8 °C) Oral 78 16 98 %   05/16/25 2130 -- -- -- -- 18 --   05/16/25 2027 137/74 98.3 °F (36.8 °C) Oral 87 18 97 %   05/16/25 1406 -- -- -- -- 16 --   05/16/25 1336 -- -- -- -- 16 --     Patient Vitals for the past 96 hrs (Last 3 readings):   Weight   05/13/25 2030 (!) 136.5 kg (300 lb 14.4 oz)      Gen: No distress.  HEENT: Normocephalic, atraumatic.   CV: Extremities warm, well perfused.   Resp: No respiratory distress.  Abd: Soft, nontender.  Ext: No edema.   MSK: + Spurling's on left.   Neuro: Alert, oriented, appropriately interactive.     Laboratory data:   Na/K/Cl/CO2:  136/4.2/104/21 (05/16 0546)   BUN/Cr/glu/ALT/AST/amyl/lip:  12/0.7/--/--/--/--/-- (05/16 0546)    WBC/Hgb/Hct/Plts:  15.7/12.3/38.1/225 (05/16 0546)     Therapy progress:       PT    Supine to Sit: Supervision or touching assistance  Sit to Supine: Supervision or touching assistance   Sit to Stand: Supervision or touching assistance  Chair/Bed to Chair Transfer: Supervision or touching assistance  Car Transfer: Supervision or touching assistance  Ambulation 10 ft:    Ambulation 50 ft:    Ambulation 150 ft:    Stairs - 1 Step: Dependent  Stairs - 4 Step: Dependent  Stairs - 12 Step:      OT    Eating: Independent  Oral Hygiene: Independent  Bathing: Supervision or touching assistance  Upper Body Dressing: Partial/moderate assistance  Lower Body Dressing: Supervision or touching

## 2025-05-17 NOTE — PLAN OF CARE
Problem: Safety - Adult  Goal: Free from fall injury  5/17/2025 1028 by Janel Cohen RN  Outcome: Progressing  Note: Pt remains free from falls.  Safety precautions in place.  Bed in lowest position, bed/chair wheels locked, call light with in reach, bedside table in reach, bed/chair alarm on, fall risk wrist band on.

## 2025-05-18 VITALS
WEIGHT: 293 LBS | BODY MASS INDEX: 41.95 KG/M2 | RESPIRATION RATE: 16 BRPM | DIASTOLIC BLOOD PRESSURE: 75 MMHG | SYSTOLIC BLOOD PRESSURE: 130 MMHG | TEMPERATURE: 97.6 F | HEIGHT: 70 IN | HEART RATE: 88 BPM | OXYGEN SATURATION: 98 %

## 2025-05-18 PROCEDURE — 6360000002 HC RX W HCPCS: Performed by: STUDENT IN AN ORGANIZED HEALTH CARE EDUCATION/TRAINING PROGRAM

## 2025-05-18 PROCEDURE — 1280000000 HC REHAB R&B

## 2025-05-18 PROCEDURE — 6370000000 HC RX 637 (ALT 250 FOR IP): Performed by: STUDENT IN AN ORGANIZED HEALTH CARE EDUCATION/TRAINING PROGRAM

## 2025-05-18 RX ADMIN — OXYCODONE HYDROCHLORIDE AND ACETAMINOPHEN 1 TABLET: 5; 325 TABLET ORAL at 10:03

## 2025-05-18 RX ADMIN — GABAPENTIN 900 MG: 300 CAPSULE ORAL at 12:05

## 2025-05-18 RX ADMIN — GABAPENTIN 600 MG: 300 CAPSULE ORAL at 10:04

## 2025-05-18 RX ADMIN — ENOXAPARIN SODIUM 30 MG: 100 INJECTION SUBCUTANEOUS at 22:11

## 2025-05-18 RX ADMIN — OXYCODONE HYDROCHLORIDE AND ACETAMINOPHEN 1 TABLET: 5; 325 TABLET ORAL at 16:46

## 2025-05-18 RX ADMIN — ENOXAPARIN SODIUM 30 MG: 100 INJECTION SUBCUTANEOUS at 10:03

## 2025-05-18 RX ADMIN — PANTOPRAZOLE SODIUM 40 MG: 40 TABLET, DELAYED RELEASE ORAL at 06:56

## 2025-05-18 RX ADMIN — LEVETIRACETAM 1000 MG: 500 TABLET, FILM COATED ORAL at 10:03

## 2025-05-18 RX ADMIN — LEVETIRACETAM 1000 MG: 500 TABLET, FILM COATED ORAL at 22:11

## 2025-05-18 RX ADMIN — CETIRIZINE HYDROCHLORIDE 5 MG: 10 TABLET, FILM COATED ORAL at 10:04

## 2025-05-18 RX ADMIN — GABAPENTIN 1200 MG: 400 CAPSULE ORAL at 22:11

## 2025-05-18 RX ADMIN — IBUPROFEN 600 MG: 600 TABLET ORAL at 22:14

## 2025-05-18 RX ADMIN — HYDROCHLOROTHIAZIDE 12.5 MG: 25 TABLET ORAL at 10:04

## 2025-05-18 RX ADMIN — PREDNISONE 20 MG: 20 TABLET ORAL at 10:04

## 2025-05-18 RX ADMIN — SENNOSIDES 17.2 MG: 8.6 TABLET, COATED ORAL at 10:12

## 2025-05-18 RX ADMIN — Medication 325 MG: at 10:04

## 2025-05-18 RX ADMIN — SERTRALINE 100 MG: 50 TABLET, FILM COATED ORAL at 10:05

## 2025-05-18 RX ADMIN — Medication 1000 MCG: at 10:04

## 2025-05-18 ASSESSMENT — PAIN DESCRIPTION - LOCATION
LOCATION: ANKLE;GENERALIZED
LOCATION: ANKLE
LOCATION: ANKLE;GENERALIZED
LOCATION: ANKLE

## 2025-05-18 ASSESSMENT — PAIN DESCRIPTION - DESCRIPTORS
DESCRIPTORS: THROBBING

## 2025-05-18 ASSESSMENT — PAIN DESCRIPTION - ORIENTATION
ORIENTATION: LEFT

## 2025-05-18 ASSESSMENT — PAIN - FUNCTIONAL ASSESSMENT
PAIN_FUNCTIONAL_ASSESSMENT: ACTIVITIES ARE NOT PREVENTED

## 2025-05-18 ASSESSMENT — PAIN DESCRIPTION - PAIN TYPE
TYPE: ACUTE PAIN;CHRONIC PAIN
TYPE: ACUTE PAIN;CHRONIC PAIN

## 2025-05-18 ASSESSMENT — PAIN SCALES - WONG BAKER: WONGBAKER_NUMERICALRESPONSE: NO HURT

## 2025-05-18 ASSESSMENT — PAIN DESCRIPTION - FREQUENCY
FREQUENCY: CONTINUOUS
FREQUENCY: CONTINUOUS

## 2025-05-18 ASSESSMENT — PAIN DESCRIPTION - ONSET
ONSET: ON-GOING
ONSET: ON-GOING

## 2025-05-18 ASSESSMENT — PAIN SCALES - GENERAL
PAINLEVEL_OUTOF10: 5
PAINLEVEL_OUTOF10: 7
PAINLEVEL_OUTOF10: 7
PAINLEVEL_OUTOF10: 0
PAINLEVEL_OUTOF10: 5

## 2025-05-18 NOTE — PROGRESS NOTES
Cely Ochoa  5/18/2025  [id number]    Chief Complaint: Functional neurological symptom disorder with mixed symptoms    Subjective       Seen in bed this morning.  No new complaints overnight.  Patient continues to have ankle pain but is planning on resting and icing today.  She was able to sleep well last night.  Plans to work with therapy tomorrow.    Objective    Patient Vitals for the past 24 hrs:   BP Temp Temp src Pulse Resp SpO2   05/18/25 0957 110/70 98 °F (36.7 °C) Oral 78 16 97 %   05/17/25 2232 127/76 97.9 °F (36.6 °C) Oral 83 18 98 %     No data found.     Gen: No distress.  HEENT: Normocephalic, atraumatic.   CV: Extremities warm, well perfused.   Resp: No respiratory distress.  Abd: Soft, nontender.  Ext: No edema.   MSK: + Spurling's on left.   Neuro: Alert, oriented, appropriately interactive.     Laboratory data:   Na/K/Cl/CO2:  136/4.2/104/21 (05/16 0546)   BUN/Cr/glu/ALT/AST/amyl/lip:  12/0.7/--/--/--/--/-- (05/16 0546)    WBC/Hgb/Hct/Plts:  15.7/12.3/38.1/225 (05/16 0546)     Therapy progress:       PT    Supine to Sit: Supervision or touching assistance  Sit to Supine: Supervision or touching assistance   Sit to Stand: Supervision or touching assistance  Chair/Bed to Chair Transfer: Supervision or touching assistance  Car Transfer: Supervision or touching assistance  Ambulation 10 ft:    Ambulation 50 ft:    Ambulation 150 ft:    Stairs - 1 Step: Dependent  Stairs - 4 Step: Dependent  Stairs - 12 Step:      OT    Eating: Independent  Oral Hygiene: Independent  Bathing: Supervision or touching assistance  Upper Body Dressing: Partial/moderate assistance  Lower Body Dressing: Supervision or touching assistance  Toilet Transfer: Supervision or touching assistance  Toilet Hygiene: Supervision or touching assistance    Speech Therapy         Body mass index is 43.17 kg/m².        Assessment/Plan:  Functional progress: Improving left upper extremity strength.  This patient continues to require an ARU

## 2025-05-18 NOTE — PLAN OF CARE
Problem: Safety - Adult  Goal: Free from fall injury  5/18/2025 1037 by Janel Cohen RN  Outcome: Progressing  Note: Pt remains free from falls.  Safety precautions in place.  Bed in lowest position, bed/chair wheels locked, call light with in reach, bedside table in reach, bed/chair alarm on, fall risk wrist band on.

## 2025-05-18 NOTE — PROGRESS NOTES
Assessment complete. Alert, oriented x4. Reports chronic generalized pain, as well as throbbing in left ankle. Patient reportedly heard their left ankle pop during therapy today, and has experienced pain at the site ever since (XR negative for acute abnormality). Non-pitting edema noted in toes on left foot. Pulse palpable (+1). Given PRN ibuprofen for pain management. Resting in bed. Ice pack applied to left ankle. Ace wrap in place around left foot/ankle. Left foot elevated off bed using two pillows. The care plan and education has been reviewed and mutually agreed upon with the patient. Alarm on, bed in lowest position, call light and table within reach. No further needs expressed at this time.

## 2025-05-18 NOTE — PLAN OF CARE
Problem: Chronic Conditions and Co-morbidities  Goal: Patient's chronic conditions and co-morbidity symptoms are monitored and maintained or improved  Outcome: Progressing  Flowsheets (Taken 5/17/2025 2232)  Care Plan - Patient's Chronic Conditions and Co-Morbidity Symptoms are Monitored and Maintained or Improved:   Monitor and assess patient's chronic conditions and comorbid symptoms for stability, deterioration, or improvement   Collaborate with multidisciplinary team to address chronic and comorbid conditions and prevent exacerbation or deterioration   Update acute care plan with appropriate goals if chronic or comorbid symptoms are exacerbated and prevent overall improvement and discharge     Problem: Pain  Goal: Verbalizes/displays adequate comfort level or baseline comfort level  Outcome: Progressing  Flowsheets (Taken 5/17/2025 2232)  Verbalizes/displays adequate comfort level or baseline comfort level: Encourage patient to monitor pain and request assistance     Problem: Safety - Adult  Goal: Free from fall injury  Outcome: Progressing     Problem: ABCDS Injury Assessment  Goal: Absence of physical injury  Outcome: Progressing  Flowsheets (Taken 5/17/2025 2232)  Absence of Physical Injury: Implement safety measures based on patient assessment     Problem: Skin/Tissue Integrity  Goal: Skin integrity remains intact  Description: 1.  Monitor for areas of redness and/or skin breakdown2.  Assess vascular access sites hourly3.  Every 4-6 hours minimum:  Change oxygen saturation probe site4.  Every 4-6 hours:  If on nasal continuous positive airway pressure, respiratory therapy assess nares and determine need for appliance change or resting period  Outcome: Progressing  Flowsheets (Taken 5/17/2025 2232)  Skin Integrity Remains Intact: Monitor for areas of redness and/or skin breakdown

## 2025-05-19 LAB
ANION GAP SERPL CALCULATED.3IONS-SCNC: 12 MMOL/L (ref 3–16)
BASOPHILS # BLD: 0.3 K/UL (ref 0–0.2)
BASOPHILS NFR BLD: 2.6 %
BUN SERPL-MCNC: 11 MG/DL (ref 7–20)
CALCIUM SERPL-MCNC: 8.8 MG/DL (ref 8.3–10.6)
CHLORIDE SERPL-SCNC: 99 MMOL/L (ref 99–110)
CO2 SERPL-SCNC: 26 MMOL/L (ref 21–32)
CREAT SERPL-MCNC: 0.8 MG/DL (ref 0.6–1.1)
DEPRECATED RDW RBC AUTO: 18 % (ref 12.4–15.4)
EOSINOPHIL # BLD: 0.1 K/UL (ref 0–0.6)
EOSINOPHIL NFR BLD: 1 %
GFR SERPLBLD CREATININE-BSD FMLA CKD-EPI: >90 ML/MIN/{1.73_M2}
GLUCOSE SERPL-MCNC: 80 MG/DL (ref 70–99)
HCT VFR BLD AUTO: 42.4 % (ref 36–48)
HGB BLD-MCNC: 14 G/DL (ref 12–16)
LYMPHOCYTES # BLD: 4.4 K/UL (ref 1–5.1)
LYMPHOCYTES NFR BLD: 35.2 %
MCH RBC QN AUTO: 26.9 PG (ref 26–34)
MCHC RBC AUTO-ENTMCNC: 32.9 G/DL (ref 31–36)
MCV RBC AUTO: 81.8 FL (ref 80–100)
MONOCYTES # BLD: 1 K/UL (ref 0–1.3)
MONOCYTES NFR BLD: 8.1 %
NEUTROPHILS # BLD: 6.6 K/UL (ref 1.7–7.7)
NEUTROPHILS NFR BLD: 53.1 %
PLATELET # BLD AUTO: 285 K/UL (ref 135–450)
PMV BLD AUTO: 7.1 FL (ref 5–10.5)
POTASSIUM SERPL-SCNC: 4 MMOL/L (ref 3.5–5.1)
RBC # BLD AUTO: 5.19 M/UL (ref 4–5.2)
SODIUM SERPL-SCNC: 137 MMOL/L (ref 136–145)
WBC # BLD AUTO: 12.4 K/UL (ref 4–11)

## 2025-05-19 PROCEDURE — 97110 THERAPEUTIC EXERCISES: CPT

## 2025-05-19 PROCEDURE — 1280000000 HC REHAB R&B

## 2025-05-19 PROCEDURE — 85025 COMPLETE CBC W/AUTO DIFF WBC: CPT

## 2025-05-19 PROCEDURE — 97116 GAIT TRAINING THERAPY: CPT

## 2025-05-19 PROCEDURE — 6360000002 HC RX W HCPCS: Performed by: STUDENT IN AN ORGANIZED HEALTH CARE EDUCATION/TRAINING PROGRAM

## 2025-05-19 PROCEDURE — 97535 SELF CARE MNGMENT TRAINING: CPT

## 2025-05-19 PROCEDURE — 97112 NEUROMUSCULAR REEDUCATION: CPT

## 2025-05-19 PROCEDURE — 80048 BASIC METABOLIC PNL TOTAL CA: CPT

## 2025-05-19 PROCEDURE — 97530 THERAPEUTIC ACTIVITIES: CPT

## 2025-05-19 PROCEDURE — 36415 COLL VENOUS BLD VENIPUNCTURE: CPT

## 2025-05-19 PROCEDURE — 6370000000 HC RX 637 (ALT 250 FOR IP): Performed by: STUDENT IN AN ORGANIZED HEALTH CARE EDUCATION/TRAINING PROGRAM

## 2025-05-19 RX ADMIN — GABAPENTIN 1200 MG: 400 CAPSULE ORAL at 20:24

## 2025-05-19 RX ADMIN — GABAPENTIN 900 MG: 300 CAPSULE ORAL at 11:51

## 2025-05-19 RX ADMIN — LEVETIRACETAM 1000 MG: 500 TABLET, FILM COATED ORAL at 10:24

## 2025-05-19 RX ADMIN — CETIRIZINE HYDROCHLORIDE 5 MG: 10 TABLET, FILM COATED ORAL at 08:49

## 2025-05-19 RX ADMIN — Medication 325 MG: at 08:49

## 2025-05-19 RX ADMIN — IBUPROFEN 600 MG: 600 TABLET ORAL at 20:27

## 2025-05-19 RX ADMIN — ENOXAPARIN SODIUM 30 MG: 100 INJECTION SUBCUTANEOUS at 20:24

## 2025-05-19 RX ADMIN — OXYCODONE HYDROCHLORIDE AND ACETAMINOPHEN 1 TABLET: 5; 325 TABLET ORAL at 06:46

## 2025-05-19 RX ADMIN — ENOXAPARIN SODIUM 30 MG: 100 INJECTION SUBCUTANEOUS at 08:49

## 2025-05-19 RX ADMIN — OXYCODONE HYDROCHLORIDE AND ACETAMINOPHEN 1 TABLET: 5; 325 TABLET ORAL at 20:27

## 2025-05-19 RX ADMIN — GABAPENTIN 600 MG: 300 CAPSULE ORAL at 08:49

## 2025-05-19 RX ADMIN — PANTOPRAZOLE SODIUM 40 MG: 40 TABLET, DELAYED RELEASE ORAL at 06:46

## 2025-05-19 RX ADMIN — OXYCODONE HYDROCHLORIDE AND ACETAMINOPHEN 1 TABLET: 5; 325 TABLET ORAL at 14:31

## 2025-05-19 RX ADMIN — Medication 1000 MCG: at 08:49

## 2025-05-19 RX ADMIN — HYDROCHLOROTHIAZIDE 12.5 MG: 25 TABLET ORAL at 08:49

## 2025-05-19 RX ADMIN — LEVETIRACETAM 1000 MG: 500 TABLET, FILM COATED ORAL at 20:25

## 2025-05-19 RX ADMIN — SERTRALINE 100 MG: 50 TABLET, FILM COATED ORAL at 08:49

## 2025-05-19 ASSESSMENT — PAIN SCALES - GENERAL
PAINLEVEL_OUTOF10: 2
PAINLEVEL_OUTOF10: 2
PAINLEVEL_OUTOF10: 0
PAINLEVEL_OUTOF10: 5
PAINLEVEL_OUTOF10: 8
PAINLEVEL_OUTOF10: 7
PAINLEVEL_OUTOF10: 5

## 2025-05-19 ASSESSMENT — PAIN DESCRIPTION - ONSET
ONSET: ON-GOING
ONSET: ON-GOING

## 2025-05-19 ASSESSMENT — PAIN DESCRIPTION - DESCRIPTORS
DESCRIPTORS: THROBBING
DESCRIPTORS: ACHING
DESCRIPTORS: THROBBING
DESCRIPTORS: ACHING

## 2025-05-19 ASSESSMENT — PAIN DESCRIPTION - PAIN TYPE
TYPE: ACUTE PAIN;CHRONIC PAIN
TYPE: ACUTE PAIN;CHRONIC PAIN

## 2025-05-19 ASSESSMENT — PAIN SCALES - WONG BAKER: WONGBAKER_NUMERICALRESPONSE: NO HURT

## 2025-05-19 ASSESSMENT — PAIN DESCRIPTION - ORIENTATION
ORIENTATION: LEFT;MID
ORIENTATION: LEFT

## 2025-05-19 ASSESSMENT — PAIN DESCRIPTION - FREQUENCY
FREQUENCY: CONTINUOUS
FREQUENCY: CONTINUOUS

## 2025-05-19 ASSESSMENT — PAIN DESCRIPTION - LOCATION
LOCATION: ARM;BACK
LOCATION: ANKLE;GENERALIZED
LOCATION: ANKLE;GENERALIZED
LOCATION: ANKLE

## 2025-05-19 NOTE — PROGRESS NOTES
Cely Ochoa  5/19/2025  [id number]    Chief Complaint: Functional neurological symptom disorder with mixed symptoms    Subjective    No acute events overnight.    Patient reports that she is doing well today.  Cough is now resolved.  Denies any shortness of breath, fever/chills.  Still having mild left ankle pain, but much improved since over the weekend.     Last BM (including prior to admit): 05/16/25.        Objective    Patient Vitals for the past 24 hrs:   BP Temp Temp src Pulse Resp SpO2   05/19/25 0720 -- -- -- -- 18 --   05/19/25 0646 -- -- -- -- 14 --   05/18/25 2059 130/75 97.6 °F (36.4 °C) Oral 88 16 98 %   05/18/25 0957 110/70 98 °F (36.7 °C) Oral 78 16 97 %     No data found.     Gen: No distress.  HEENT: Normocephalic, atraumatic.   CV: Extremities warm, well perfused.   Resp: No respiratory distress.  Abd: Soft, nontender.  Ext: No edema.   MSK: Mild left lateral ankle swelling and tenderness to palpation.   Neuro: Alert, oriented, appropriately interactive.     Laboratory data:   Na/K/Cl/CO2:  137/4.0/99/26 (05/19 0727)   BUN/Cr/glu/ALT/AST/amyl/lip:  11/0.8/--/--/--/--/-- (05/19 0727)    WBC/Hgb/Hct/Plts:  12.4/14.0/42.4/285 (05/19 0727)     Therapy progress:       PT    Supine to Sit: Supervision or touching assistance  Sit to Supine: Supervision or touching assistance   Sit to Stand: Supervision or touching assistance  Chair/Bed to Chair Transfer: Supervision or touching assistance  Car Transfer: Supervision or touching assistance  Ambulation 10 ft:    Ambulation 50 ft:    Ambulation 150 ft:    Stairs - 1 Step: Dependent  Stairs - 4 Step: Dependent  Stairs - 12 Step:      OT    Eating: Independent  Oral Hygiene: Independent  Bathing: Supervision or touching assistance  Upper Body Dressing: Partial/moderate assistance  Lower Body Dressing: Supervision or touching assistance  Toilet Transfer: Supervision or touching assistance  Toilet Hygiene: Supervision or touching assistance    Speech

## 2025-05-19 NOTE — PROGRESS NOTES
[facility] - Inpatient Rehabilitation Department   Phone: [phone]    Occupational Therapy    [] Initial Evaluation            [x] Daily Treatment Note         [] Discharge Summary      Patient: Cely Ochoa   : 1985   MRN: [id number]   Date of Service:  2025    Admitting Diagnosis:  Functional neurological symptom disorder with mixed symptoms  Current Admission Summary:   Cely Ochoa is a 39 y.o. female with PMHx notable for depression, anxiety, meningitis, seizures, obesity who presented on 2025 with several days of upper respiratory symptoms, then on date of admission had an episode of emesis followed by what was described as a generalized motor seizure.  She had several other apparent seizure episodes, received Versed.  Neuro exam at that time was concerning for left facial droop, left arm and leg weakness. CT Head demonstrated no acute intracranial abnormality. CTA Head/Neck demonstrated no large vessel occlusion or flow-limiting stenosis. MRI Brain demonstrated no acute intracranial abnormality. TTE w/ normal LVEF, negative for PFO. Neurology consulted, performed EEG which was WNL, recommended continuing Keppra and gabapentin, outpatient neurology follow-up with possible need for admission to epilepsy monitoring unit. She was found to be positive for human metapneumovirus, also with acute asthma exacerbation which improved w/ steroids and azithromycin   Past Medical History:  has a past medical history of Anxiety, Asthma, Cerebrovascular disease, Depression, Left foot drop, Meningitis, Seizures (HCC), and Stroke (HCC).  Past Surgical History:  has a past surgical history that includes Tonsillectomy; skin biopsy; and Tooth Extraction (2019).    Discharge Recommendations: Home with OT     DME Required For Discharge: [facility]     Precautions/Restrictions: high fall risk  Weight Bearing Restrictions: no restrictions     Required Braces/Orthotics: no braces required- has L AFO

## 2025-05-19 NOTE — PROGRESS NOTES
and therapy activities modified       Functional Mobility  Bed Mobility:  Sit to Supine: modified independent  Comments:  Transfers:  Sit to stand transfer: contact guard assistance  Stand to sit transfer: contact guard assistance  Stand pivot transfer: contact guard assistance  Bed to chair transfer: contact guard assistance  Comments: During the pivots pt mostly utilizing her RLE with minimal WB through her LLE. For STS transfers, PT ensuring good foot position on her L.     Ambulation Trial  Surface:level surface  Assistive Device: rolling walker  Other Appliance: (L), dorsiflex assist, walker splint attachment  Assistance: minimal assistance, assistance for LLE foot position and knee block, CGA for balance   Distance: 250 ft   Gait Mechanics: L foot slightly supinating and L knee hyperextending (blocking provided), rotates at the hips to circumduct her LLE fwd but with cueing is able to reduce that compensation, slower jason, partial step through pattern    Comments:    Stair Mobility  Number of Steps: 7  Step Height: 4 inch and 6 inch  Hand Rails: (B) handrail  Device: no device  Other Appliance: (L), dorsiflex assist  Assistance: minimal assistance for positioning of her LLE on ascent/descent and knee block   Comments: Additional curb step (2 x 4 inch steps) using the RW placed on the top step with min A. Repeated with use of a LBQC and HHA on the L at min A.      Wheelchair Mobility:  Chair: manual  Surface: level surface  Method: (R) UE and (R) LE  Distance: 40 ft  Assistance: modified independent  Comments: LLE elevated on a leg rest  Balance:  Static Sitting Balance: good: independent with functional balance in unsupported position  Dynamic Sitting Balance: good: independent with functional balance in unsupported position  Static Standing Balance: fair (-): maintains balance at CGA with use of UE support  Dynamic Standing Balance: poor (+): requires min (A) to maintain balance  Comments:    Other

## 2025-05-19 NOTE — PLAN OF CARE
Problem: Chronic Conditions and Co-morbidities  Goal: Patient's chronic conditions and co-morbidity symptoms are monitored and maintained or improved  Outcome: Progressing  Flowsheets (Taken 5/18/2025 2220)  Care Plan - Patient's Chronic Conditions and Co-Morbidity Symptoms are Monitored and Maintained or Improved:   Monitor and assess patient's chronic conditions and comorbid symptoms for stability, deterioration, or improvement   Collaborate with multidisciplinary team to address chronic and comorbid conditions and prevent exacerbation or deterioration   Update acute care plan with appropriate goals if chronic or comorbid symptoms are exacerbated and prevent overall improvement and discharge     Problem: Pain  Goal: Verbalizes/displays adequate comfort level or baseline comfort level  Outcome: Progressing  Flowsheets (Taken 5/18/2025 2059)  Verbalizes/displays adequate comfort level or baseline comfort level: Encourage patient to monitor pain and request assistance     Problem: Safety - Adult  Goal: Free from fall injury  Outcome: Progressing     Problem: ABCDS Injury Assessment  Goal: Absence of physical injury  Outcome: Progressing  Flowsheets (Taken 5/19/2025 0354)  Absence of Physical Injury: Implement safety measures based on patient assessment     Problem: Skin/Tissue Integrity  Goal: Skin integrity remains intact  Description: 1.  Monitor for areas of redness and/or skin breakdown2.  Assess vascular access sites hourly3.  Every 4-6 hours minimum:  Change oxygen saturation probe site4.  Every 4-6 hours:  If on nasal continuous positive airway pressure, respiratory therapy assess nares and determine need for appliance change or resting period  Outcome: Progressing  Flowsheets (Taken 5/18/2025 2220)  Skin Integrity Remains Intact: Monitor for areas of redness and/or skin breakdown

## 2025-05-19 NOTE — PLAN OF CARE
Problem: Chronic Conditions and Co-morbidities  Goal: Patient's chronic conditions and co-morbidity symptoms are monitored and maintained or improved  5/19/2025 0856 by Luis Alberto Fields RN  Outcome: Progressing     Problem: Discharge Planning  Goal: Discharge to home or other facility with appropriate resources  Outcome: Progressing  Flowsheets (Taken 5/18/2025 2220 by Destiney Morrow, RN)  Discharge to home or other facility with appropriate resources: Identify discharge learning needs (meds, wound care, etc)     Problem: Pain  Goal: Verbalizes/displays adequate comfort level or baseline comfort level  5/19/2025 0856 by Luis Alberto Fields RN  Outcome: Progressing     Problem: Safety - Adult  Goal: Free from fall injury  5/19/2025 0856 by Luis Alberto Fields RN  Outcome: Progressing     Problem: ABCDS Injury Assessment  Goal: Absence of physical injury  5/19/2025 0856 by Luis Alberto Fields RN  Outcome: Progressing     Problem: Skin/Tissue Integrity  Goal: Skin integrity remains intact  Description: 1.  Monitor for areas of redness and/or skin breakdown2.  Assess vascular access sites hourly3.  Every 4-6 hours minimum:  Change oxygen saturation probe site4.  Every 4-6 hours:  If on nasal continuous positive airway pressure, respiratory therapy assess nares and determine need for appliance change or resting period  5/19/2025 0856 by Luis Alberto Fields RN  Outcome: Progressing

## 2025-05-20 PROCEDURE — 97112 NEUROMUSCULAR REEDUCATION: CPT

## 2025-05-20 PROCEDURE — 97530 THERAPEUTIC ACTIVITIES: CPT

## 2025-05-20 PROCEDURE — 6370000000 HC RX 637 (ALT 250 FOR IP): Performed by: STUDENT IN AN ORGANIZED HEALTH CARE EDUCATION/TRAINING PROGRAM

## 2025-05-20 PROCEDURE — 97110 THERAPEUTIC EXERCISES: CPT

## 2025-05-20 PROCEDURE — 1280000000 HC REHAB R&B

## 2025-05-20 PROCEDURE — 97116 GAIT TRAINING THERAPY: CPT

## 2025-05-20 PROCEDURE — 6360000002 HC RX W HCPCS: Performed by: STUDENT IN AN ORGANIZED HEALTH CARE EDUCATION/TRAINING PROGRAM

## 2025-05-20 RX ADMIN — OXYCODONE HYDROCHLORIDE AND ACETAMINOPHEN 1 TABLET: 5; 325 TABLET ORAL at 14:50

## 2025-05-20 RX ADMIN — OXYCODONE HYDROCHLORIDE AND ACETAMINOPHEN 1 TABLET: 5; 325 TABLET ORAL at 21:14

## 2025-05-20 RX ADMIN — OXYCODONE HYDROCHLORIDE AND ACETAMINOPHEN 1 TABLET: 5; 325 TABLET ORAL at 06:04

## 2025-05-20 RX ADMIN — IBUPROFEN 600 MG: 600 TABLET ORAL at 08:34

## 2025-05-20 RX ADMIN — ENOXAPARIN SODIUM 30 MG: 100 INJECTION SUBCUTANEOUS at 21:15

## 2025-05-20 RX ADMIN — PANTOPRAZOLE SODIUM 40 MG: 40 TABLET, DELAYED RELEASE ORAL at 06:04

## 2025-05-20 RX ADMIN — Medication 325 MG: at 08:34

## 2025-05-20 RX ADMIN — GABAPENTIN 900 MG: 300 CAPSULE ORAL at 12:08

## 2025-05-20 RX ADMIN — IBUPROFEN 600 MG: 600 TABLET ORAL at 21:18

## 2025-05-20 RX ADMIN — LEVETIRACETAM 1000 MG: 500 TABLET, FILM COATED ORAL at 08:33

## 2025-05-20 RX ADMIN — SERTRALINE 100 MG: 50 TABLET, FILM COATED ORAL at 08:34

## 2025-05-20 RX ADMIN — HYDROCHLOROTHIAZIDE 12.5 MG: 25 TABLET ORAL at 08:33

## 2025-05-20 RX ADMIN — ENOXAPARIN SODIUM 30 MG: 100 INJECTION SUBCUTANEOUS at 08:34

## 2025-05-20 RX ADMIN — LEVETIRACETAM 1000 MG: 500 TABLET, FILM COATED ORAL at 21:14

## 2025-05-20 RX ADMIN — GABAPENTIN 600 MG: 300 CAPSULE ORAL at 08:36

## 2025-05-20 RX ADMIN — Medication 1000 MCG: at 08:34

## 2025-05-20 RX ADMIN — GABAPENTIN 1200 MG: 400 CAPSULE ORAL at 21:14

## 2025-05-20 RX ADMIN — CETIRIZINE HYDROCHLORIDE 5 MG: 10 TABLET, FILM COATED ORAL at 08:33

## 2025-05-20 ASSESSMENT — PAIN DESCRIPTION - ORIENTATION
ORIENTATION: LEFT
ORIENTATION: LEFT

## 2025-05-20 ASSESSMENT — PAIN DESCRIPTION - ONSET
ONSET: ON-GOING
ONSET: GRADUAL

## 2025-05-20 ASSESSMENT — PAIN SCALES - GENERAL
PAINLEVEL_OUTOF10: 0
PAINLEVEL_OUTOF10: 4
PAINLEVEL_OUTOF10: 6
PAINLEVEL_OUTOF10: 7
PAINLEVEL_OUTOF10: 4
PAINLEVEL_OUTOF10: 4
PAINLEVEL_OUTOF10: 6
PAINLEVEL_OUTOF10: 2
PAINLEVEL_OUTOF10: 0

## 2025-05-20 ASSESSMENT — PAIN DESCRIPTION - DESCRIPTORS
DESCRIPTORS: ACHING

## 2025-05-20 ASSESSMENT — PAIN SCALES - WONG BAKER
WONGBAKER_NUMERICALRESPONSE: NO HURT

## 2025-05-20 ASSESSMENT — PAIN DESCRIPTION - FREQUENCY
FREQUENCY: CONTINUOUS
FREQUENCY: CONTINUOUS

## 2025-05-20 ASSESSMENT — PAIN DESCRIPTION - LOCATION
LOCATION: ANKLE
LOCATION: ANKLE
LOCATION: GENERALIZED
LOCATION: GENERALIZED

## 2025-05-20 ASSESSMENT — PAIN DESCRIPTION - PAIN TYPE: TYPE: ACUTE PAIN

## 2025-05-20 NOTE — PROGRESS NOTES
independence once her new AFO is available for added ankle stability.  Pt remains motivated to advance her activity and strength in order to return home closer to her baseline mobility status.     Safety Interventions: patient left in bed and call light within reach    Plan  Frequency: 5 x/week, 90 min/day  Current Treatment Recommendations: strengthening, balance training, functional mobility training, transfer training, gait training, stair training, endurance training, neuromuscular re-education, and wheelchair mobility training    Goals  Patient Goals: return home, improve mobility    Short Term Goals:  Time Frame: to be met in 10 days  Patient will complete bed mobility at modified independent   Patient will complete transfers at [facility]   Patient will ambulate 50 ft with use of LRAD at [facility]  Patient will ascend/descend 12 stairs with single ascending handrail at contact guard assistance  Patient will complete car transfer at [facility]  Patient will complete manual w/c propulsion 150 ft at modified independent    Above goals reviewed on 5/20/2025.  All goals are ongoing at this time unless indicated above.      Therapy Session Time      Individual Group Co-treatment   Time In 0845       Time Out 1015       Minutes 90           Timed Code Treatment Minutes:  90    Total Treatment Minutes:  90         Electronically Signed By: Sweta Marcelino PT, DPT [id number]

## 2025-05-20 NOTE — PROGRESS NOTES
touching assistance  Ambulation 10 ft:    Ambulation 50 ft:    Ambulation 150 ft:    Stairs - 1 Step: Dependent  Stairs - 4 Step: Dependent  Stairs - 12 Step:      OT    Eating: Independent  Oral Hygiene: Independent  Bathing: Supervision or touching assistance  Upper Body Dressing: Partial/moderate assistance  Lower Body Dressing: Supervision or touching assistance  Toilet Transfer: Supervision or touching assistance  Toilet Hygiene: Supervision or touching assistance    Speech Therapy         Body mass index is 43.17 kg/m².        Assessment/Plan:  Functional progress: Ambulating 270' CGA w/ RW.  This patient continues to require an ARU level of care from all disciplines to address the following issues:    #. FND w/ left hemiparesis and sensory changes  - CT Head demonstrated no acute intracranial abnormality. CTA Head/Neck demonstrated no large vessel occlusion or flow-limiting stenosis. MRI Brain demonstrated no acute intracranial abnormality.  - Continue PT/OT  - Will benefit greatly from outpatient cognitive behavioral therapy  - Continue to provide education on diagnosis     #. Left hand digit 4-5 extension weakness  - No evidence of peripheral neuropathy, trigger finger  - Suspect functional in nature as well  - Continue passive ROM, strengthening w/ therapy  - May consider repeat EMG/NCS as outpatient if not resovled    #. Left ankle pain  - XR 5/17: Negative for acute fracture/dislocation  - Continue ice, elevation, Ibuprofen and Tylenol PRN. Wean opioids.     #. Hx of seizure disorder/PNES  - Continue Keppra  - Avoid Tramadol  - Needs outpatient follow-up with Neurology, EMU recommended.      #. Human metapneumovirus infection, resolved  #. Acute asthma exacerbation, resolved  - Supplemental oxygen to maintain SpO2 >92%  - Completed steroid taper, oral azithromycin.   - Mucinex, Tessalon PRN  - Breathing treatments PRN  - Off droplet precautions  - Steroid-induced leukocytosis is now resolved.     #.

## 2025-05-20 NOTE — PROGRESS NOTES
to do stairs    Short Term Goals:  Time Frame: 10 days   Patient will complete upper body ADL at modified independent   Patient will complete lower body ADL at modified independent   Patient will complete toileting at modified independent   Patient will complete functional transfers at modified independent   Patient to gather and transport items at modified independent     Above goals reviewed on 5/20/2025.  All goals are ongoing at this time unless indicated above.       Therapy Session Time  First Session Therapy Time:   Individual Concurrent Group Co-treatment   Time In 730         Time Out 815         Minutes 45            Second Session Therapy Time:   Individual Concurrent Group Co-treatment   Time In 1355         Time Out 1440         Minutes 45           Timed Code Treatment Minutes:  45 + 45 minutes     Total Treatment Minutes:  90 minutes     Electronically Signed By:   First session: DEE Cardona OTR/L [id number]  Second session: Teena SAMANIEGO/JUAN LUIS    OT provided direct supervision to student, facilitated in making skilled judgments throughout duration of session.  DEE Cardona OTR/L [id number]

## 2025-05-21 LAB
ANION GAP SERPL CALCULATED.3IONS-SCNC: 8 MMOL/L (ref 3–16)
ANISOCYTOSIS BLD QL SMEAR: ABNORMAL
BASOPHILS # BLD: 0.1 K/UL (ref 0–0.2)
BASOPHILS NFR BLD: 1 %
BUN SERPL-MCNC: 14 MG/DL (ref 7–20)
CALCIUM SERPL-MCNC: 9.1 MG/DL (ref 8.3–10.6)
CHLORIDE SERPL-SCNC: 103 MMOL/L (ref 99–110)
CO2 SERPL-SCNC: 26 MMOL/L (ref 21–32)
CREAT SERPL-MCNC: 0.8 MG/DL (ref 0.6–1.1)
DEPRECATED RDW RBC AUTO: 17.8 % (ref 12.4–15.4)
EOSINOPHIL # BLD: 0 K/UL (ref 0–0.6)
EOSINOPHIL NFR BLD: 0 %
GFR SERPLBLD CREATININE-BSD FMLA CKD-EPI: >90 ML/MIN/{1.73_M2}
GLUCOSE SERPL-MCNC: 87 MG/DL (ref 70–99)
HCT VFR BLD AUTO: 38.9 % (ref 36–48)
HGB BLD-MCNC: 13.1 G/DL (ref 12–16)
LYMPHOCYTES # BLD: 3 K/UL (ref 1–5.1)
LYMPHOCYTES NFR BLD: 32 %
MCH RBC QN AUTO: 27.4 PG (ref 26–34)
MCHC RBC AUTO-ENTMCNC: 33.6 G/DL (ref 31–36)
MCV RBC AUTO: 81.5 FL (ref 80–100)
MONOCYTES # BLD: 0.9 K/UL (ref 0–1.3)
MONOCYTES NFR BLD: 11 %
NEUTROPHILS # BLD: 4.1 K/UL (ref 1.7–7.7)
NEUTROPHILS NFR BLD: 50 %
NEUTS BAND NFR BLD MANUAL: 1 % (ref 0–7)
PLATELET # BLD AUTO: 221 K/UL (ref 135–450)
PLATELET BLD QL SMEAR: ADEQUATE
PMV BLD AUTO: 7.3 FL (ref 5–10.5)
POTASSIUM SERPL-SCNC: 4.3 MMOL/L (ref 3.5–5.1)
RBC # BLD AUTO: 4.77 M/UL (ref 4–5.2)
SLIDE REVIEW: ABNORMAL
SODIUM SERPL-SCNC: 137 MMOL/L (ref 136–145)
VARIANT LYMPHS NFR BLD MANUAL: 5 % (ref 0–6)
WBC # BLD AUTO: 8 K/UL (ref 4–11)

## 2025-05-21 PROCEDURE — 6370000000 HC RX 637 (ALT 250 FOR IP): Performed by: STUDENT IN AN ORGANIZED HEALTH CARE EDUCATION/TRAINING PROGRAM

## 2025-05-21 PROCEDURE — 97530 THERAPEUTIC ACTIVITIES: CPT

## 2025-05-21 PROCEDURE — 97116 GAIT TRAINING THERAPY: CPT

## 2025-05-21 PROCEDURE — 80048 BASIC METABOLIC PNL TOTAL CA: CPT

## 2025-05-21 PROCEDURE — 97110 THERAPEUTIC EXERCISES: CPT

## 2025-05-21 PROCEDURE — 36415 COLL VENOUS BLD VENIPUNCTURE: CPT

## 2025-05-21 PROCEDURE — 1280000000 HC REHAB R&B

## 2025-05-21 PROCEDURE — 97112 NEUROMUSCULAR REEDUCATION: CPT

## 2025-05-21 PROCEDURE — 6360000002 HC RX W HCPCS: Performed by: STUDENT IN AN ORGANIZED HEALTH CARE EDUCATION/TRAINING PROGRAM

## 2025-05-21 PROCEDURE — 97535 SELF CARE MNGMENT TRAINING: CPT

## 2025-05-21 PROCEDURE — 85025 COMPLETE CBC W/AUTO DIFF WBC: CPT

## 2025-05-21 RX ADMIN — OXYCODONE HYDROCHLORIDE AND ACETAMINOPHEN 1 TABLET: 5; 325 TABLET ORAL at 06:01

## 2025-05-21 RX ADMIN — OXYCODONE HYDROCHLORIDE AND ACETAMINOPHEN 1 TABLET: 5; 325 TABLET ORAL at 20:35

## 2025-05-21 RX ADMIN — GABAPENTIN 600 MG: 300 CAPSULE ORAL at 08:47

## 2025-05-21 RX ADMIN — SERTRALINE 100 MG: 50 TABLET, FILM COATED ORAL at 08:46

## 2025-05-21 RX ADMIN — ERGOCALCIFEROL 50000 UNITS: 1.25 CAPSULE ORAL at 08:46

## 2025-05-21 RX ADMIN — ENOXAPARIN SODIUM 30 MG: 100 INJECTION SUBCUTANEOUS at 20:28

## 2025-05-21 RX ADMIN — SENNOSIDES 17.2 MG: 8.6 TABLET, COATED ORAL at 20:34

## 2025-05-21 RX ADMIN — Medication 1000 MCG: at 08:47

## 2025-05-21 RX ADMIN — HYDROCHLOROTHIAZIDE 12.5 MG: 25 TABLET ORAL at 08:47

## 2025-05-21 RX ADMIN — ENOXAPARIN SODIUM 30 MG: 100 INJECTION SUBCUTANEOUS at 08:47

## 2025-05-21 RX ADMIN — CETIRIZINE HYDROCHLORIDE 5 MG: 10 TABLET, FILM COATED ORAL at 08:47

## 2025-05-21 RX ADMIN — IBUPROFEN 600 MG: 600 TABLET ORAL at 20:36

## 2025-05-21 RX ADMIN — LEVETIRACETAM 1000 MG: 500 TABLET, FILM COATED ORAL at 08:48

## 2025-05-21 RX ADMIN — Medication 325 MG: at 08:47

## 2025-05-21 RX ADMIN — PANTOPRAZOLE SODIUM 40 MG: 40 TABLET, DELAYED RELEASE ORAL at 08:48

## 2025-05-21 RX ADMIN — IBUPROFEN 600 MG: 600 TABLET ORAL at 06:00

## 2025-05-21 RX ADMIN — LEVETIRACETAM 1000 MG: 500 TABLET, FILM COATED ORAL at 20:34

## 2025-05-21 RX ADMIN — GABAPENTIN 900 MG: 300 CAPSULE ORAL at 11:44

## 2025-05-21 RX ADMIN — GABAPENTIN 1200 MG: 400 CAPSULE ORAL at 20:34

## 2025-05-21 RX ADMIN — OXYCODONE HYDROCHLORIDE AND ACETAMINOPHEN 1 TABLET: 5; 325 TABLET ORAL at 15:12

## 2025-05-21 ASSESSMENT — PAIN SCALES - WONG BAKER
WONGBAKER_NUMERICALRESPONSE: NO HURT

## 2025-05-21 ASSESSMENT — PAIN SCALES - GENERAL
PAINLEVEL_OUTOF10: 0
PAINLEVEL_OUTOF10: 6
PAINLEVEL_OUTOF10: 0
PAINLEVEL_OUTOF10: 7
PAINLEVEL_OUTOF10: 7
PAINLEVEL_OUTOF10: 3

## 2025-05-21 ASSESSMENT — PAIN DESCRIPTION - ORIENTATION
ORIENTATION: LEFT
ORIENTATION: LEFT
ORIENTATION: RIGHT

## 2025-05-21 ASSESSMENT — PAIN DESCRIPTION - LOCATION
LOCATION: FOOT

## 2025-05-21 ASSESSMENT — PAIN DESCRIPTION - DESCRIPTORS
DESCRIPTORS: ACHING

## 2025-05-21 ASSESSMENT — PAIN DESCRIPTION - PAIN TYPE: TYPE: ACUTE PAIN

## 2025-05-21 ASSESSMENT — PAIN - FUNCTIONAL ASSESSMENT: PAIN_FUNCTIONAL_ASSESSMENT: ACTIVITIES ARE NOT PREVENTED

## 2025-05-21 NOTE — PROGRESS NOTES
[facility] - Inpatient Rehabilitation Department   Phone: [phone]    Occupational Therapy    [] Initial Evaluation            [x] Daily Treatment Note         [] Discharge Summary      Patient: Cely Ochoa   : 1985   MRN: [id number]   Date of Service:  2025    Admitting Diagnosis:  Functional neurological symptom disorder with mixed symptoms  Current Admission Summary:   Cely Ochoa is a 39 y.o. female with PMHx notable for depression, anxiety, meningitis, seizures, obesity who presented on 2025 with several days of upper respiratory symptoms, then on date of admission had an episode of emesis followed by what was described as a generalized motor seizure.  She had several other apparent seizure episodes, received Versed.  Neuro exam at that time was concerning for left facial droop, left arm and leg weakness. CT Head demonstrated no acute intracranial abnormality. CTA Head/Neck demonstrated no large vessel occlusion or flow-limiting stenosis. MRI Brain demonstrated no acute intracranial abnormality. TTE w/ normal LVEF, negative for PFO. Neurology consulted, performed EEG which was WNL, recommended continuing Keppra and gabapentin, outpatient neurology follow-up with possible need for admission to epilepsy monitoring unit. She was found to be positive for human metapneumovirus, also with acute asthma exacerbation which improved w/ steroids and azithromycin   Past Medical History:  has a past medical history of Anxiety, Asthma, Cerebrovascular disease, Depression, Left foot drop, Meningitis, Seizures (HCC), and Stroke (HCC).  Past Surgical History:  has a past surgical history that includes Tonsillectomy; skin biopsy; and Tooth Extraction (2019).    Discharge Recommendations: Home with OT     DME Required For Discharge: DAFNE MAR (being delivered on )     Precautions/Restrictions: high fall risk  Weight Bearing Restrictions: no restrictions     Required Braces/Orthotics:

## 2025-05-21 NOTE — PATIENT CARE CONFERENCE
[facility] Inpatient Rehabilitation Department  Weekly Team Conference Note    Patient Name: Cely Ochoa      MRN: [id number]  : 1985  (39 y.o.) Gender: female     The team conference for this patient was held on 25 at 11am and was led by:  Taiwo Park MD     CASE MANAGEMENT:  Assessment: Patient is 39 year old female from home with spouse. Patient was admitted to ARU for Functional neurological symptom disorder with mixed symptoms. Discharge date is 25. Patient is alert and oriented x 4. She has a active insurance with a PCP in the community. Patient's ambulates with a rolling walker with stand by assistance, contact guard assistance. Disposition is home with home healthcare. DME is TBD. SW will continue to follow therapy and Dr. Park recommendations and discuss closer to discharge.     PHYSICAL THERAPY    Current Status:  Bed Mobility:  Supine to Sit: Independent  Sit to Supine: Independent  Comments: Supine to/from sit performed on flat mat table without railing.  Transfers:  Sit to stand transfer: modified independent  Stand to sit transfer: modified independent  Stand pivot transfer: modified independent  Toilet transfer: modified independent  Comments: Pt is now up ad tiffany for transfers between surfaces and use of her w/c.   Ambulation Trial  Surface:level surface  Assistive Device: rolling walker  Other Appliance: (L), dorsiflex assist  Assistance: contact guard assistance, assistance for LLE foot position and knee block (to prevent hyperextension) intermittently   Distance: 270 ft   Gait Mechanics: L foot slightly supinating and L knee hyperextending (blocking provided), rotates at the hips to circumduct her LLE fwd but with cueing is able to reduce that compensation, slower jason, partial step through pattern, inconsistent L foot placement   Comments:   Stair Mobility  Stair mobility not completed on this date.   Comments:   Wheelchair Mobility:  Chair:

## 2025-05-21 NOTE — PROGRESS NOTES
SBA/supervision without use of UE support  Dynamic Standing Balance: fair (-): maintains balance at CGA with use of UE support  Comments:    Other Therapeutic Interventions  1st session: see details above. Seated stepper x 7 minutes using BUE/LEs (LUE ACE wrapped onto the handle and tc at her L ankle/thigh at Level 1.5).    Ballet bar activities with BUE support:   -3 way target taps 2x10 per LE (PT providing tc at her L LE and pelvis)   -Hip abduction and diagonal (ext/abd) 2x10 B with same tc provided on the LLE (RLE elevated on a 2inch block to provide clearance for her LLE)   -Mini squat holds with green tband around thighs 10 sec hold 2x10 reps     Seated L ankle therex (shoe removed):   -eversion with towel under her foot 2x10 (stabilization at her knee to block compensations)   -DF 2x10 with tapping at her anterior tib   -PF with assist to increase range after initial motion performed 2x10   -Rockerboard AP rocks 2x10 (stabilization at the ankle provided)    Pt returned to her room.  Now up ad tiffany for transfers between surfaces including the toilet.         Functional Outcomes                 Cognition  WFL  Orientation:    alert and oriented x 4  Command Following:   WFL    Education  Barriers To Learning: none  Patient Education: patient educated on goals, PT role and benefits, plan of care  Learning Assessment:  patient verbalizes understanding, would benefit from continued reinforcement    Assessment  Activity Tolerance:tolerated well, minimal ankle pain reported with activities  Impairments Requiring Therapeutic Intervention: decreased functional mobility, decreased strength, decreased endurance, decreased balance, decreased coordination  Prognosis: good  Clinical Assessment: Ongoing improvements in her safety with transfers and ambulation with the RW. Completing stairs to simulate home entry and within the home with available UE support.  Improved active movement of her L ankle in all planes compared to

## 2025-05-22 PROCEDURE — 1280000000 HC REHAB R&B

## 2025-05-22 PROCEDURE — 97530 THERAPEUTIC ACTIVITIES: CPT

## 2025-05-22 PROCEDURE — 97116 GAIT TRAINING THERAPY: CPT

## 2025-05-22 PROCEDURE — 6360000002 HC RX W HCPCS: Performed by: STUDENT IN AN ORGANIZED HEALTH CARE EDUCATION/TRAINING PROGRAM

## 2025-05-22 PROCEDURE — 6370000000 HC RX 637 (ALT 250 FOR IP): Performed by: STUDENT IN AN ORGANIZED HEALTH CARE EDUCATION/TRAINING PROGRAM

## 2025-05-22 PROCEDURE — 97110 THERAPEUTIC EXERCISES: CPT

## 2025-05-22 RX ADMIN — IBUPROFEN 600 MG: 600 TABLET ORAL at 06:15

## 2025-05-22 RX ADMIN — Medication 325 MG: at 07:54

## 2025-05-22 RX ADMIN — GABAPENTIN 600 MG: 300 CAPSULE ORAL at 07:53

## 2025-05-22 RX ADMIN — GABAPENTIN 1200 MG: 400 CAPSULE ORAL at 20:21

## 2025-05-22 RX ADMIN — LEVETIRACETAM 1000 MG: 500 TABLET, FILM COATED ORAL at 20:20

## 2025-05-22 RX ADMIN — GABAPENTIN 900 MG: 300 CAPSULE ORAL at 12:39

## 2025-05-22 RX ADMIN — ENOXAPARIN SODIUM 30 MG: 100 INJECTION SUBCUTANEOUS at 20:21

## 2025-05-22 RX ADMIN — HYDROCHLOROTHIAZIDE 12.5 MG: 25 TABLET ORAL at 07:53

## 2025-05-22 RX ADMIN — IBUPROFEN 600 MG: 600 TABLET ORAL at 18:28

## 2025-05-22 RX ADMIN — OXYCODONE HYDROCHLORIDE AND ACETAMINOPHEN 1 TABLET: 5; 325 TABLET ORAL at 09:21

## 2025-05-22 RX ADMIN — LEVETIRACETAM 1000 MG: 500 TABLET, FILM COATED ORAL at 07:53

## 2025-05-22 RX ADMIN — Medication 1000 MCG: at 07:53

## 2025-05-22 RX ADMIN — CETIRIZINE HYDROCHLORIDE 5 MG: 10 TABLET, FILM COATED ORAL at 07:53

## 2025-05-22 RX ADMIN — PANTOPRAZOLE SODIUM 40 MG: 40 TABLET, DELAYED RELEASE ORAL at 06:15

## 2025-05-22 RX ADMIN — SERTRALINE 100 MG: 50 TABLET, FILM COATED ORAL at 07:53

## 2025-05-22 RX ADMIN — OXYCODONE HYDROCHLORIDE AND ACETAMINOPHEN 1 TABLET: 5; 325 TABLET ORAL at 20:20

## 2025-05-22 RX ADMIN — ENOXAPARIN SODIUM 30 MG: 100 INJECTION SUBCUTANEOUS at 07:54

## 2025-05-22 ASSESSMENT — PAIN SCALES - GENERAL
PAINLEVEL_OUTOF10: 4
PAINLEVEL_OUTOF10: 3
PAINLEVEL_OUTOF10: 3
PAINLEVEL_OUTOF10: 5
PAINLEVEL_OUTOF10: 3
PAINLEVEL_OUTOF10: 6
PAINLEVEL_OUTOF10: 3
PAINLEVEL_OUTOF10: 8
PAINLEVEL_OUTOF10: 4
PAINLEVEL_OUTOF10: 0

## 2025-05-22 ASSESSMENT — PAIN DESCRIPTION - LOCATION
LOCATION: ANKLE
LOCATION: FOOT
LOCATION: GENERALIZED
LOCATION: GENERALIZED
LOCATION: ANKLE

## 2025-05-22 ASSESSMENT — PAIN SCALES - WONG BAKER: WONGBAKER_NUMERICALRESPONSE: NO HURT

## 2025-05-22 ASSESSMENT — PAIN DESCRIPTION - ORIENTATION
ORIENTATION: RIGHT;LEFT
ORIENTATION: RIGHT
ORIENTATION: RIGHT;LEFT

## 2025-05-22 ASSESSMENT — PAIN DESCRIPTION - PAIN TYPE: TYPE: CHRONIC PAIN

## 2025-05-22 ASSESSMENT — PAIN - FUNCTIONAL ASSESSMENT
PAIN_FUNCTIONAL_ASSESSMENT: ACTIVITIES ARE NOT PREVENTED

## 2025-05-22 ASSESSMENT — PAIN DESCRIPTION - DESCRIPTORS
DESCRIPTORS: ACHING
DESCRIPTORS: ACHING
DESCRIPTORS: SORE
DESCRIPTORS: ACHING

## 2025-05-22 ASSESSMENT — PAIN DESCRIPTION - ONSET: ONSET: ON-GOING

## 2025-05-22 NOTE — PATIENT CARE CONFERENCE
Team conference/Weekly Summary         Team conference held today. Patient is 39 year old female from home with spouse. Patient was admitted to ARU for Functional neurological symptom disorder with mixed symptoms. Discharge date is 5/24/25. Patient is alert and oriented x 4. She has a active insurance with a PCP in the community. Patient's ambulates with a rolling walker with stand by assistance, contact guard assistance. Disposition is home with [facility]. No DME recommendations as patient has required DME for discharge. SW will continue to follow therapy and Dr. Park recommendations and discuss closer to discharge.   Electronically signed by JIAN Lewis on 5/22/25 at 3:24 PM EDT

## 2025-05-22 NOTE — PROGRESS NOTES
Nutrition Note    RECOMMENDATIONS  PO Diet: Regular as tolerated  ONS: N/A     ASSESSMENT   Nutrition intervention triggered for f/u.  Receives a regular diet with po intake usually greater than 75% of meals.  Family also provides food from outside. No new wt to review for changes.  Nutrition status is stable as pt is at low risk for nutrition compromise.       Malnutrition Status: No malnutrition  Acute Illness    NUTRITION DIAGNOSIS   No nutrition diagnosis at this time     Goals: Maintain adequate nutrition status, by next RD assessment     NUTRITION RELATED FINDINGS  Objective: Labs reviewed, LBM 5/19; nonpitting edema LLE  Wounds: None    CURRENT NUTRITION THERAPIES  ADULT DIET; Regular       PO Intake: %   PO Supplement Intake:None Ordered    ANTHROPOMETRICS  Current Height: 177.8 cm (5' 10\")  Current Weight - Scale: (!) 136.5 kg (300 lb 14.4 oz)    Admission weight:      COMPARATIVE STANDARDS  Total Energy Requirements (kcals/day): 2131     Protein (g):         Fluid (mL/day):  2131    EDUCATION  Education/Counseling not indicated     The patient will be monitored per nutrition standards of care. Consult dietitian if additional nutrition interventions are needed prior to RD reassessment.     Ana Bello RD, LD    Contact: [phone]

## 2025-05-22 NOTE — PROGRESS NOTES
Cely Ochoa  5/22/2025  [id number]    Chief Complaint: Functional neurological symptom disorder with mixed symptoms    Subjective    No acute events overnight.    Patient reports that she is doing well today. She denies any fevers, chills, chest pain, dyspnea, abdominal pain.     Last BM (including prior to admit): 05/19/25.        Objective    Patient Vitals for the past 24 hrs:   BP Temp Temp src Pulse Resp SpO2   05/22/25 0750 134/89 97.8 °F (36.6 °C) Oral 85 16 93 %   05/21/25 2105 -- -- -- -- 16 --   05/21/25 2015 105/70 98 °F (36.7 °C) Oral 88 16 98 %   05/21/25 1542 -- -- -- -- 16 --   05/21/25 1512 -- -- -- -- 18 --   05/21/25 0845 112/80 98.1 °F (36.7 °C) -- 99 18 99 %     No data found.     Gen: No distress.  HEENT: Normocephalic, atraumatic.   CV: Extremities warm, well perfused.   Resp: No respiratory distress.  Abd: Soft, nontender.  Ext: No edema.   Neuro: Alert, oriented, appropriately interactive.     Laboratory data:   Na/K/Cl/CO2:  137/4.3/103/26 (05/21 0601)   BUN/Cr/glu/ALT/AST/amyl/lip:  14/0.8/--/--/--/--/-- (05/21 0601)    WBC/Hgb/Hct/Plts:  8.0/13.1/38.9/221 (05/21 0601)     Therapy progress:       PT    Supine to Sit: Supervision or touching assistance  Sit to Supine: Supervision or touching assistance   Sit to Stand: Supervision or touching assistance  Chair/Bed to Chair Transfer: Supervision or touching assistance  Car Transfer: Supervision or touching assistance  Ambulation 10 ft:    Ambulation 50 ft:    Ambulation 150 ft:    Stairs - 1 Step: Dependent  Stairs - 4 Step: Dependent  Stairs - 12 Step:      OT    Eating: Independent  Oral Hygiene: Independent  Bathing: Supervision or touching assistance  Upper Body Dressing: Independent  Lower Body Dressing: Supervision or touching assistance  Toilet Transfer: Supervision or touching assistance  Toilet Hygiene: Supervision or touching assistance    Speech Therapy         Body mass index is 43.17 kg/m².        Assessment/Plan:  Functional

## 2025-05-22 NOTE — PROGRESS NOTES
[facility] - Inpatient Rehabilitation Department   Phone: [phone]    Physical Therapy    [] Initial Evaluation            [x] Daily Treatment Note         [] Discharge Summary      Patient: Cely Ochoa   : 1985   MRN: [id number]   Date of Service:  2025  Admitting Diagnosis: Functional neurological symptom disorder with mixed symptoms  Current Admission Summary: Cely Ochoa is a 39 y.o. female with PMHx notable for depression, anxiety, meningitis, seizures, obesity who presented on 2025 with several days of upper respiratory symptoms, then on date of admission had an episode of emesis followed by what was described as a generalized motor seizure.  She had several other apparent seizure episodes, received Versed.  Neuro exam at that time was concerning for left facial droop, left arm and leg weakness. CT Head demonstrated no acute intracranial abnormality. CTA Head/Neck demonstrated no large vessel occlusion or flow-limiting stenosis. MRI Brain demonstrated no acute intracranial abnormality. TTE w/ normal LVEF, negative for PFO. Neurology consulted, performed EEG which was WNL, recommended continuing Keppra and gabapentin, outpatient neurology follow-up with possible need for admission to epilepsy monitoring unit. She was found to be positive for human metapneumovirus, also with acute asthma exacerbation which improved w/ steroids and azithromycin.   Past Medical History:  has a past medical history of Anxiety, Asthma, Cerebrovascular disease, Depression, Left foot drop, Meningitis, Seizures (HCC), and Stroke (HCC).  Past Surgical History:  has a past surgical history that includes Tonsillectomy; skin biopsy; and Tooth Extraction (2019).  Discharge Recommendations: home with ongoing PT services ([facility])  DME Required For Discharge: patient has all required DME for discharge; new AFO  Precautions/Restrictions: high fall risk  Weight Bearing Restrictions: no restrictions  [] Right

## 2025-05-22 NOTE — DISCHARGE INSTR - COC
Continuity of Care Form    Patient Name: Cely Ochoa   :  1985  MRN:  [id number]  24  Admit date:  2025  Discharge date:  25    Code Status Order: Full Code   Advance Directives:     Admitting Physician:  Taiwo Park MD  PCP: Leigha Meneses DO    Discharging Nurse: Soheila  Discharging Hospital Unit/Room#: ARU-4901/4901-01  Discharging Unit Phone Number: [phone]    Emergency Contact:   Extended Emergency Contact Information  Primary Emergency Contact: Bonnie Ochoa  Address: [address] [place] [address]  Home Phone: [phone]  Relation: Spouse  Secondary Emergency Contact: Lauryn Hong  Home Phone: [phone]  Relation: Parent    Past Surgical History:  Past Surgical History:   Procedure Laterality Date    SKIN BIOPSY      TONSILLECTOMY      TOOTH EXTRACTION         Immunization History:   Immunization History   Administered Date(s) Administered    Hep B, HEPLISAV-B, (age 18y+), IM, 0.5mL 2024    Pneumococcal, PCV20, PREVNAR 20, (age 6w+), IM, 0.5mL 2024    TDaP, ADACEL (age 10y-64y), BOOSTRIX (age 10y+), IM, 0.5mL 2024       Active Problems:  Patient Active Problem List   Diagnosis Code    Left foot drop M21.372    Pain in both feet M79.671, M79.672    Pulmonary nodules R91.8    History of hemorrhagic cerebrovascular accident (CVA) with residual deficit I69.30    Current smoker F17.200    Moderate episode of recurrent major depressive disorder (HCC) F33.1    EVI (generalized anxiety disorder) F41.1    Allergy-induced asthma, mild intermittent, uncomplicated J45.20    Fluid retention in legs R60.0    Primary osteoarthritis involving multiple joints M15.0    BMI 45.0-49.9, adult ([facility]) Z68.42    Seizure disorder ([facility]) G40.909    Chronic pain of both knees M25.561, M25.562, G89.29    Weakness of left upper extremity R29.898    Acute left-sided weakness R53.1    Seizure-like activity ([facility]) R56.9    Acute bronchiolitis due to

## 2025-05-22 NOTE — PROGRESS NOTES
[facility] - Inpatient Rehabilitation Department   Phone: [phone]    Occupational Therapy    [] Initial Evaluation            [x] Daily Treatment Note         [] Discharge Summary      Patient: Cely Ochoa   : 1985   MRN: [id number]   Date of Service:  2025    Admitting Diagnosis:  Functional neurological symptom disorder with mixed symptoms  Current Admission Summary:   Cely Ochoa is a 39 y.o. female with PMHx notable for depression, anxiety, meningitis, seizures, obesity who presented on 2025 with several days of upper respiratory symptoms, then on date of admission had an episode of emesis followed by what was described as a generalized motor seizure.  She had several other apparent seizure episodes, received Versed.  Neuro exam at that time was concerning for left facial droop, left arm and leg weakness. CT Head demonstrated no acute intracranial abnormality. CTA Head/Neck demonstrated no large vessel occlusion or flow-limiting stenosis. MRI Brain demonstrated no acute intracranial abnormality. TTE w/ normal LVEF, negative for PFO. Neurology consulted, performed EEG which was WNL, recommended continuing Keppra and gabapentin, outpatient neurology follow-up with possible need for admission to epilepsy monitoring unit. She was found to be positive for human metapneumovirus, also with acute asthma exacerbation which improved w/ steroids and azithromycin   Past Medical History:  has a past medical history of Anxiety, Asthma, Cerebrovascular disease, Depression, Left foot drop, Meningitis, Seizures (HCC), and Stroke (HCC).  Past Surgical History:  has a past surgical history that includes Tonsillectomy; skin biopsy; and Tooth Extraction (2019).    Discharge Recommendations: Home with OT     DME Required For Discharge: DAFNE MAR (being delivered on )     Precautions/Restrictions: high fall risk  Weight Bearing Restrictions: no restrictions     Required Braces/Orthotics:

## 2025-05-22 NOTE — PLAN OF CARE
Problem: Chronic Conditions and Co-morbidities  Goal: Patient's chronic conditions and co-morbidity symptoms are monitored and maintained or improved  Outcome: Progressing  Flowsheets  Taken 5/22/2025 0802 by Soheila Pate RN  Care Plan - Patient's Chronic Conditions and Co-Morbidity Symptoms are Monitored and Maintained or Improved: Monitor and assess patient's chronic conditions and comorbid symptoms for stability, deterioration, or improvement  Taken 5/21/2025 2015 by Pierce Narvaez RN  Care Plan - Patient's Chronic Conditions and Co-Morbidity Symptoms are Monitored and Maintained or Improved: Monitor and assess patient's chronic conditions and comorbid symptoms for stability, deterioration, or improvement     Problem: Discharge Planning  Goal: Discharge to home or other facility with appropriate resources  Outcome: Progressing  Flowsheets (Taken 5/22/2025 0802)  Discharge to home or other facility with appropriate resources:   Identify barriers to discharge with patient and caregiver   Identify discharge learning needs (meds, wound care, etc)     Problem: Pain  Goal: Verbalizes/displays adequate comfort level or baseline comfort level  Outcome: Progressing  Flowsheets (Taken 5/22/2025 0750)  Verbalizes/displays adequate comfort level or baseline comfort level:   Encourage patient to monitor pain and request assistance   Assess pain using appropriate pain scale   Administer analgesics based on type and severity of pain and evaluate response   Implement non-pharmacological measures as appropriate and evaluate response   Consider cultural and social influences on pain and pain management     Problem: Safety - Adult  Goal: Free from fall injury  Outcome: Progressing     Problem: ABCDS Injury Assessment  Goal: Absence of physical injury  Outcome: Progressing     Problem: Skin/Tissue Integrity  Goal: Skin integrity remains intact  Description: 1.  Monitor for areas of redness and/or skin breakdown2.  Assess

## 2025-05-23 ENCOUNTER — TELEPHONE (OUTPATIENT)
Dept: INTERNAL MEDICINE CLINIC | Age: 40
End: 2025-05-23

## 2025-05-23 LAB
ANION GAP SERPL CALCULATED.3IONS-SCNC: 10 MMOL/L (ref 3–16)
BASOPHILS # BLD: 0.1 K/UL (ref 0–0.2)
BASOPHILS NFR BLD: 1.6 %
BUN SERPL-MCNC: 14 MG/DL (ref 7–20)
CALCIUM SERPL-MCNC: 8.8 MG/DL (ref 8.3–10.6)
CHLORIDE SERPL-SCNC: 102 MMOL/L (ref 99–110)
CO2 SERPL-SCNC: 26 MMOL/L (ref 21–32)
CREAT SERPL-MCNC: 0.8 MG/DL (ref 0.6–1.1)
DEPRECATED RDW RBC AUTO: 18.4 % (ref 12.4–15.4)
EOSINOPHIL # BLD: 0.1 K/UL (ref 0–0.6)
EOSINOPHIL NFR BLD: 2.4 %
GFR SERPLBLD CREATININE-BSD FMLA CKD-EPI: >90 ML/MIN/{1.73_M2}
GLUCOSE SERPL-MCNC: 94 MG/DL (ref 70–99)
HCT VFR BLD AUTO: 39.4 % (ref 36–48)
HGB BLD-MCNC: 12.9 G/DL (ref 12–16)
LYMPHOCYTES # BLD: 2 K/UL (ref 1–5.1)
LYMPHOCYTES NFR BLD: 37.7 %
MCH RBC QN AUTO: 27 PG (ref 26–34)
MCHC RBC AUTO-ENTMCNC: 32.7 G/DL (ref 31–36)
MCV RBC AUTO: 82.6 FL (ref 80–100)
MONOCYTES # BLD: 0.8 K/UL (ref 0–1.3)
MONOCYTES NFR BLD: 15.9 %
NEUTROPHILS # BLD: 2.2 K/UL (ref 1.7–7.7)
NEUTROPHILS NFR BLD: 42.4 %
PLATELET # BLD AUTO: 227 K/UL (ref 135–450)
PMV BLD AUTO: 7.3 FL (ref 5–10.5)
POTASSIUM SERPL-SCNC: 4.4 MMOL/L (ref 3.5–5.1)
RBC # BLD AUTO: 4.77 M/UL (ref 4–5.2)
SODIUM SERPL-SCNC: 138 MMOL/L (ref 136–145)
WBC # BLD AUTO: 5.3 K/UL (ref 4–11)

## 2025-05-23 PROCEDURE — 6370000000 HC RX 637 (ALT 250 FOR IP): Performed by: STUDENT IN AN ORGANIZED HEALTH CARE EDUCATION/TRAINING PROGRAM

## 2025-05-23 PROCEDURE — 97530 THERAPEUTIC ACTIVITIES: CPT

## 2025-05-23 PROCEDURE — 1280000000 HC REHAB R&B

## 2025-05-23 PROCEDURE — 97110 THERAPEUTIC EXERCISES: CPT

## 2025-05-23 PROCEDURE — 97116 GAIT TRAINING THERAPY: CPT

## 2025-05-23 PROCEDURE — 6360000002 HC RX W HCPCS: Performed by: STUDENT IN AN ORGANIZED HEALTH CARE EDUCATION/TRAINING PROGRAM

## 2025-05-23 PROCEDURE — 97535 SELF CARE MNGMENT TRAINING: CPT

## 2025-05-23 RX ORDER — FERROUS SULFATE 325(65) MG
325 TABLET ORAL
Qty: 30 TABLET | Refills: 0 | Status: SHIPPED | OUTPATIENT
Start: 2025-05-23

## 2025-05-23 RX ORDER — OXYCODONE AND ACETAMINOPHEN 5; 325 MG/1; MG/1
1 TABLET ORAL DAILY PRN
Qty: 7 TABLET | Refills: 0 | Status: SHIPPED | OUTPATIENT
Start: 2025-05-23 | End: 2025-05-30

## 2025-05-23 RX ADMIN — LEVETIRACETAM 1000 MG: 500 TABLET, FILM COATED ORAL at 20:11

## 2025-05-23 RX ADMIN — ENOXAPARIN SODIUM 30 MG: 100 INJECTION SUBCUTANEOUS at 08:36

## 2025-05-23 RX ADMIN — Medication 1000 MCG: at 08:36

## 2025-05-23 RX ADMIN — GABAPENTIN 1200 MG: 400 CAPSULE ORAL at 20:11

## 2025-05-23 RX ADMIN — SERTRALINE 100 MG: 50 TABLET, FILM COATED ORAL at 08:37

## 2025-05-23 RX ADMIN — Medication 325 MG: at 08:37

## 2025-05-23 RX ADMIN — CETIRIZINE HYDROCHLORIDE 5 MG: 10 TABLET, FILM COATED ORAL at 08:37

## 2025-05-23 RX ADMIN — ACETAMINOPHEN 650 MG: 325 TABLET ORAL at 18:09

## 2025-05-23 RX ADMIN — GABAPENTIN 900 MG: 300 CAPSULE ORAL at 12:03

## 2025-05-23 RX ADMIN — GABAPENTIN 600 MG: 300 CAPSULE ORAL at 08:36

## 2025-05-23 RX ADMIN — HYDROCHLOROTHIAZIDE 12.5 MG: 25 TABLET ORAL at 08:37

## 2025-05-23 RX ADMIN — PANTOPRAZOLE SODIUM 40 MG: 40 TABLET, DELAYED RELEASE ORAL at 06:06

## 2025-05-23 RX ADMIN — IBUPROFEN 600 MG: 600 TABLET ORAL at 06:06

## 2025-05-23 RX ADMIN — OXYCODONE HYDROCHLORIDE AND ACETAMINOPHEN 1 TABLET: 5; 325 TABLET ORAL at 06:06

## 2025-05-23 RX ADMIN — LEVETIRACETAM 1000 MG: 500 TABLET, FILM COATED ORAL at 08:36

## 2025-05-23 RX ADMIN — IBUPROFEN 600 MG: 600 TABLET ORAL at 15:50

## 2025-05-23 ASSESSMENT — PAIN SCALES - GENERAL
PAINLEVEL_OUTOF10: 6
PAINLEVEL_OUTOF10: 7
PAINLEVEL_OUTOF10: 4
PAINLEVEL_OUTOF10: 0
PAINLEVEL_OUTOF10: 5
PAINLEVEL_OUTOF10: 7
PAINLEVEL_OUTOF10: 7
PAINLEVEL_OUTOF10: 4

## 2025-05-23 ASSESSMENT — PAIN - FUNCTIONAL ASSESSMENT
PAIN_FUNCTIONAL_ASSESSMENT: ACTIVITIES ARE NOT PREVENTED

## 2025-05-23 ASSESSMENT — PAIN DESCRIPTION - ORIENTATION
ORIENTATION: RIGHT;LEFT
ORIENTATION: RIGHT;LEFT

## 2025-05-23 ASSESSMENT — PAIN DESCRIPTION - LOCATION
LOCATION: GENERALIZED
LOCATION: ANKLE
LOCATION: ANKLE;HIP

## 2025-05-23 ASSESSMENT — PAIN DESCRIPTION - DESCRIPTORS
DESCRIPTORS: ACHING
DESCRIPTORS: SORE;ACHING

## 2025-05-23 ASSESSMENT — PAIN DESCRIPTION - ONSET: ONSET: ON-GOING

## 2025-05-23 ASSESSMENT — PAIN DESCRIPTION - PAIN TYPE: TYPE: CHRONIC PAIN;ACUTE PAIN

## 2025-05-23 NOTE — DISCHARGE SUMMARY
Physical Medicine & Rehabilitation  Discharge Summary     Patient Identification:  Cely Ochoa  : 1985  Admit date: 2025  Discharge date: 25   Attending provider: Taiwo Park MD        Primary care provider: Leigha Meneses DO     Discharge Diagnoses:   Patient Active Problem List   Diagnosis    Left foot drop    Pain in both feet    Pulmonary nodules    History of hemorrhagic cerebrovascular accident (CVA) with residual deficit    Current smoker    Moderate episode of recurrent major depressive disorder (HCC)    EVI (generalized anxiety disorder)    Allergy-induced asthma, mild intermittent, uncomplicated    Fluid retention in legs    Primary osteoarthritis involving multiple joints    BMI 45.0-49.9, adult (HCC)    Seizure disorder (HCC)    Chronic pain of both knees    Weakness of left upper extremity    Acute left-sided weakness    Seizure-like activity ([facility])    Acute bronchiolitis due to human metapneumovirus (hMPV)    Asthma exacerbation    Iron deficiency    Functional neurological symptom disorder with mixed symptoms       Discharge Functional Status:      Physical therapy:  Supine to Sit: Independent  Sit to Supine: Independent      Sit to Stand: Independent  Chair/Bed to Chair Transfer: Independent  Car Transfer: Independent     Ambulation 10 ft: Independent  Ambulation 50 ft: Independent  Ambulation 150 ft: Independent  Stairs - 1 Step: Independent  Stairs - 4 Step: Independent  Stairs - 12 Step: Independent    Occupational therapy:  Eating: Independent  Oral Hygiene: Independent  Bathing: Independent  Upper Body Dressing: Independent  Lower Body Dressing: Independent     Toilet Transfer: Independent  Toilet Hygiene: Independent    Speech therapy:         History of Present Illness/Acute Hospital Course:  Cely Ochoa is a 39 y.o. female with PMHx notable for depression, anxiety, meningitis, seizures, obesity who presented on 2025 with several days of upper respiratory symptoms,

## 2025-05-23 NOTE — PLAN OF CARE
Progressing  5/23/2025 0510 by Tony Campos, RN  Outcome: Progressing     Problem: Skin/Tissue Integrity  Goal: Skin integrity remains intact  Description: 1.  Monitor for areas of redness and/or skin breakdown2.  Assess vascular access sites hourly3.  Every 4-6 hours minimum:  Change oxygen saturation probe site4.  Every 4-6 hours:  If on nasal continuous positive airway pressure, respiratory therapy assess nares and determine need for appliance change or resting period  5/23/2025 0923 by Soheila Pate, RN  Outcome: Progressing  Flowsheets (Taken 5/23/2025 0841)  Skin Integrity Remains Intact: Monitor for areas of redness and/or skin breakdown  5/23/2025 0510 by Tony Campos, RN  Outcome: Progressing

## 2025-05-23 NOTE — PROGRESS NOTES
CLINICAL PHARMACY NOTE: MEDS TO BEDS    Total # of Prescriptions Filled: 2   The following medications were delivered to the patient:  Percocet 5/325 mg   Ferosul 325 mg     Additional Documentation: Soheila approved to deliver medication to patient room=signed  Oly Benson CPhT

## 2025-05-23 NOTE — PROGRESS NOTES
[facility] - Inpatient Rehabilitation Department   Phone: [phone]    Occupational Therapy    [] Initial Evaluation            [x] Daily Treatment Note         [x] Discharge Summary      Patient: Cely Ochoa   : 1985   MRN: [id number]   Date of Service:  2025    Admitting Diagnosis:  Functional neurological symptom disorder with mixed symptoms  Current Admission Summary:   Cely Ochoa is a 39 y.o. female with PMHx notable for depression, anxiety, meningitis, seizures, obesity who presented on 2025 with several days of upper respiratory symptoms, then on date of admission had an episode of emesis followed by what was described as a generalized motor seizure.  She had several other apparent seizure episodes, received Versed.  Neuro exam at that time was concerning for left facial droop, left arm and leg weakness. CT Head demonstrated no acute intracranial abnormality. CTA Head/Neck demonstrated no large vessel occlusion or flow-limiting stenosis. MRI Brain demonstrated no acute intracranial abnormality. TTE w/ normal LVEF, negative for PFO. Neurology consulted, performed EEG which was WNL, recommended continuing Keppra and gabapentin, outpatient neurology follow-up with possible need for admission to epilepsy monitoring unit. She was found to be positive for human metapneumovirus, also with acute asthma exacerbation which improved w/ steroids and azithromycin   Past Medical History:  has a past medical history of Anxiety, Asthma, Cerebrovascular disease, Depression, Left foot drop, Meningitis, Seizures (HCC), and Stroke (HCC).  Past Surgical History:  has a past surgical history that includes Tonsillectomy; skin biopsy; and Tooth Extraction (2019).    Discharge Recommendations: Home with OT     DME Required For Discharge: [facility] MAR (being delivered on )     Precautions/Restrictions: high fall risk  Weight Bearing Restrictions: no restrictions     Required

## 2025-05-23 NOTE — PROGRESS NOTES
Cely Ochoa  5/23/2025  [id number]    Chief Complaint: Functional neurological symptom disorder with mixed symptoms    Subjective    No acute events overnight.    Patient reports that she is doing well. Discussed plans for discharge tomorrow with patient.  She overall feels ready for discharge, denies any specific questions or concerns.     Last BM (including prior to admit): 05/22/25 (per pt w/therapy).        Objective    Patient Vitals for the past 24 hrs:   BP Temp Temp src Pulse Resp SpO2   05/23/25 0830 136/85 98.3 °F (36.8 °C) Oral 99 16 91 %   05/22/25 2050 -- -- -- -- 18 --   05/22/25 2016 114/70 98.4 °F (36.9 °C) Oral 95 18 96 %   05/22/25 0951 -- -- -- -- 16 --   05/22/25 0921 -- -- -- -- 16 --     No data found.     Gen: No distress.  HEENT: Normocephalic, atraumatic.   CV: Extremities warm, well perfused.   Resp: No respiratory distress.  Abd: Soft, nontender.  Ext: No edema.   Neuro: Alert, oriented, appropriately interactive.     Laboratory data:   Na/K/Cl/CO2:  138/4.4/102/26 (05/23 0644)   BUN/Cr/glu/ALT/AST/amyl/lip:  14/0.8/--/--/--/--/-- (05/23 0644)    WBC/Hgb/Hct/Plts:  5.3/12.9/39.4/227 (05/23 0644)     Therapy progress:       PT    Supine to Sit: Independent  Sit to Supine: Independent   Sit to Stand: Independent  Chair/Bed to Chair Transfer: Independent  Car Transfer: Supervision or touching assistance  Ambulation 10 ft: Supervision or touching assistance  Ambulation 50 ft: Supervision or touching assistance  Ambulation 150 ft: Supervision or touching assistance  Stairs - 1 Step: Dependent  Stairs - 4 Step: Dependent  Stairs - 12 Step:      OT    Eating: Independent  Oral Hygiene: Independent  Bathing: Supervision or touching assistance  Upper Body Dressing: Independent  Lower Body Dressing: Supervision or touching assistance  Toilet Transfer: Supervision or touching assistance  Toilet Hygiene: Supervision or touching assistance    Speech Therapy         Body mass index is 43.17 kg/m².

## 2025-05-23 NOTE — DISCHARGE INSTRUCTIONS
Equipment used: tub transfer bench, walker basket      []  Independent ?  [x]  Modified Independent ?  []  Supervision                []  Minimal Assistance ? []  Moderate Assistance ? []  Maximal Assistance      Driving Restriction:      []  YES   [] NO     Mobility:      Ambulation: Device Rolling Walker     []  Independent ?  [x]  Modified Independent ?  []  Supervision                []  Minimal Assistance ? []  Moderate Assistance ? []  Maximal Assistance     Stairs:  Device Rolling walker for curb steps    Number of stairs: 12    Handrails:    [x] Right   [] Left      [] Bilateral   []  Independent ?  [x]  Modified Independent ?  []  Supervision                []  Minimal Assistance ? []  Moderate Assistance ? []  Maximal Assistance     Wheelchair:     ? []  Independent ?  [x]  Modified Independent ?  []  Supervision                 []  Minimal Assistance  ?[]  Moderate Assistance ? []  Maximal Assistance       Current Diet Consistency:?       []  Regular   [] Soft and Bite-Sized  ?[] Minced and Moist     ?[]  Puree     Current Liquid Level:?         [] Thin Liquids     [] Mildly Thick (Nectar) ?    [] Moderately Thick (Honey)    [] Pudding Thick    [] [place]az Water Protocol     Dietary Restrictions:?      []  General Diet   []  Tube Feed, w/ Diet   []  Tube Feed, NPO   []  Carb Control   []  Cardiac   []  Renal    []  Other:

## 2025-05-23 NOTE — PROGRESS NOTES
Surgical wound to posterior left thigh assessed by MD, scant amount of drainage noted. Also assessed soft lump to upper medial left thigh with NNO noted at this time. Per patient, she stated that she has had that lump for a long time and that it does not bother her.

## 2025-05-23 NOTE — TELEPHONE ENCOUNTER
[facility] calling to get verbal orders  for PT and OT.     Meera is the contact at [phone], option [phone]

## 2025-05-23 NOTE — PROGRESS NOTES
[facility] - Inpatient Rehabilitation Department   Phone: [phone]    Physical Therapy    [] Initial Evaluation            [x] Daily Treatment Note         [x] Discharge Summary      Patient: Cely Ochoa   : 1985   MRN: [id number]   Date of Service:  2025  Admitting Diagnosis: Functional neurological symptom disorder with mixed symptoms  Current Admission Summary: Cely Ochoa is a 39 y.o. female with PMHx notable for depression, anxiety, meningitis, seizures, obesity who presented on 2025 with several days of upper respiratory symptoms, then on date of admission had an episode of emesis followed by what was described as a generalized motor seizure.  She had several other apparent seizure episodes, received Versed.  Neuro exam at that time was concerning for left facial droop, left arm and leg weakness. CT Head demonstrated no acute intracranial abnormality. CTA Head/Neck demonstrated no large vessel occlusion or flow-limiting stenosis. MRI Brain demonstrated no acute intracranial abnormality. TTE w/ normal LVEF, negative for PFO. Neurology consulted, performed EEG which was WNL, recommended continuing Keppra and gabapentin, outpatient neurology follow-up with possible need for admission to epilepsy monitoring unit. She was found to be positive for human metapneumovirus, also with acute asthma exacerbation which improved w/ steroids and azithromycin.   Past Medical History:  has a past medical history of Anxiety, Asthma, Cerebrovascular disease, Depression, Left foot drop, Meningitis, Seizures (HCC), and Stroke (HCC).  Past Surgical History:  has a past surgical history that includes Tonsillectomy; skin biopsy; and Tooth Extraction (2019).  Discharge Recommendations: home with ongoing PT services (HHPT)  DME Required For Discharge: patient has all required DME for discharge; new AFO  Precautions/Restrictions: high fall risk  Weight Bearing Restrictions: no restrictions  [] Right

## 2025-05-24 VITALS
BODY MASS INDEX: 41.95 KG/M2 | TEMPERATURE: 98.1 F | WEIGHT: 293 LBS | OXYGEN SATURATION: 95 % | RESPIRATION RATE: 16 BRPM | SYSTOLIC BLOOD PRESSURE: 111 MMHG | DIASTOLIC BLOOD PRESSURE: 61 MMHG | HEART RATE: 88 BPM | HEIGHT: 70 IN

## 2025-05-24 PROCEDURE — 6370000000 HC RX 637 (ALT 250 FOR IP): Performed by: STUDENT IN AN ORGANIZED HEALTH CARE EDUCATION/TRAINING PROGRAM

## 2025-05-24 RX ADMIN — IBUPROFEN 600 MG: 600 TABLET ORAL at 06:26

## 2025-05-24 RX ADMIN — Medication 325 MG: at 08:37

## 2025-05-24 RX ADMIN — LEVETIRACETAM 1000 MG: 500 TABLET, FILM COATED ORAL at 08:37

## 2025-05-24 RX ADMIN — Medication 1000 MCG: at 08:37

## 2025-05-24 RX ADMIN — OXYCODONE HYDROCHLORIDE AND ACETAMINOPHEN 1 TABLET: 5; 325 TABLET ORAL at 06:26

## 2025-05-24 RX ADMIN — HYDROCHLOROTHIAZIDE 12.5 MG: 25 TABLET ORAL at 08:37

## 2025-05-24 RX ADMIN — GABAPENTIN 600 MG: 300 CAPSULE ORAL at 08:36

## 2025-05-24 RX ADMIN — SERTRALINE 100 MG: 50 TABLET, FILM COATED ORAL at 08:37

## 2025-05-24 RX ADMIN — CETIRIZINE HYDROCHLORIDE 5 MG: 10 TABLET, FILM COATED ORAL at 08:37

## 2025-05-24 RX ADMIN — PANTOPRAZOLE SODIUM 40 MG: 40 TABLET, DELAYED RELEASE ORAL at 06:26

## 2025-05-24 ASSESSMENT — PAIN DESCRIPTION - PAIN TYPE: TYPE: ACUTE PAIN;CHRONIC PAIN

## 2025-05-24 ASSESSMENT — PAIN SCALES - GENERAL
PAINLEVEL_OUTOF10: 3
PAINLEVEL_OUTOF10: 8
PAINLEVEL_OUTOF10: 4
PAINLEVEL_OUTOF10: 8

## 2025-05-24 ASSESSMENT — PAIN DESCRIPTION - ORIENTATION
ORIENTATION: RIGHT;LEFT
ORIENTATION: RIGHT;LEFT

## 2025-05-24 ASSESSMENT — PAIN DESCRIPTION - LOCATION
LOCATION: LEG
LOCATION: ANKLE

## 2025-05-24 ASSESSMENT — PAIN DESCRIPTION - DESCRIPTORS: DESCRIPTORS: SORE

## 2025-05-24 ASSESSMENT — PAIN DESCRIPTION - ONSET: ONSET: ON-GOING

## 2025-05-24 ASSESSMENT — PAIN - FUNCTIONAL ASSESSMENT: PAIN_FUNCTIONAL_ASSESSMENT: ACTIVITIES ARE NOT PREVENTED

## 2025-05-24 NOTE — PROGRESS NOTES
Patient discharged via w/c with all her personal belongings with spouse at side. Assisted into car sans any incident.

## 2025-05-24 NOTE — CARE COORDINATION
Case Management -  Discharge Note      Patient Name: Cely Ochoa                   YOB: 1985  Room: [place] [address]            Readmission Risk (Low < 19, Mod (19-27), High > 27): Readmission Risk Score: 15.6    Current PCP: Leigha Meneses DO      (IMM) Important Message from Medicare:    Has pt received appropriate compliance notices before being discharged if required: yes  Compliance doc:  [x] 2nd IMM; [] Code 44 [] Lee  Date Given: 5/23/2025 Given By: greta    PT AM-PAC:   /24  OT AM-PAC:   /24    Patient/patient representative has been educated on the benefits of [facility] services as well as the possible risks of declining recommended services. Patient/patient representative has acknowledged the information provided and decided on the following discharge plan. Patient/ patient representative has been provided freedom of choice regarding service provider, supported by basic dialogue that supports the patient's individualized plan of care/goals.  Home Care Information:   Is patient resuming current home health care services: No    Home Care Agency:   See below            Services: ot, pt, nsg  Home Health Order Obtained: yes    Home health agency notified of discharge.       C agency notified of discharge:  [x] Yes [] No  [] NA    Family notified of discharge:  [] Yes  [] No  [] NA    Facility notified of discharge:  [] Yes  [] No  [x] NA    Pt is being discharged with Outpt IV Antibiotics  [] Yes [] No  [x] NA  If yes, make sure DERREK is faxed to C agency, and meds are called in to pharmacy by RN from DERREK orders only.      Financial    Payor: [facility] MEDICARE / Plan: [employer] DUAL COMPLETE / Product Type: *No Product type* /     Pharmacy:  Potential assistance Purchasing Medications: No  Meds-to-Beds request: Yes      [id number] - [place], [place] - [address] - P [phone] - F [phone]  [address]  [facility] [address]  Phone: [phone] Fax: [phone]    [facility] 
Discharge Planning Note:     (SW) followed up on HHC referrals:     [facility]- out of network  [facility]-out of network  [facility]- out of network  Electronically signed by JIAN Lewis on 5/19/25 at 2:32 PM EDT   
Discharge Planning Note:     Writer spoke to Bonnie (spouse) via telephone confirming patients discharge today with [facility].     Electronically signed by Winter Muñoz RN on 5/24/25 at 10:09 AM EDT     
Discharge Plan?      JIAN Lewis  Case Management Department  Ph: [phone]  Electronically signed by JIAN Lewis on 5/15/25 at 4:47 PM EDT

## 2025-05-24 NOTE — PLAN OF CARE
Problem: Chronic Conditions and Co-morbidities  Goal: Patient's chronic conditions and co-morbidity symptoms are monitored and maintained or improved  5/23/2025 2312 by Tony Campos RN  Outcome: Progressing     Problem: Discharge Planning  Goal: Discharge to home or other facility with appropriate resources  5/23/2025 2312 by Tony Campos RN  Outcome: Progressing     Problem: Pain  Goal: Verbalizes/displays adequate comfort level or baseline comfort level  5/23/2025 2312 by Tony Campos RN  Outcome: Progressing     Problem: Safety - Adult  Goal: Free from fall injury  5/23/2025 2312 by Tony Campos RN  Outcome: Progressing     Problem: ABCDS Injury Assessment  Goal: Absence of physical injury  5/23/2025 2312 by Tony Campos RN  Outcome: Progressing     Problem: Skin/Tissue Integrity  Goal: Skin integrity remains intact  Description: 1.  Monitor for areas of redness and/or skin breakdown2.  Assess vascular access sites hourly3.  Every 4-6 hours minimum:  Change oxygen saturation probe site4.  Every 4-6 hours:  If on nasal continuous positive airway pressure, respiratory therapy assess nares and determine need for appliance change or resting period  5/23/2025 2312 by Tony Campos RN  Outcome: Progressing

## 2025-05-24 NOTE — PROGRESS NOTES
[facility] Discharge Assessment    Transportation  \"Has lack of transportation kept you from medical appointments, meetings, work, or from getting things needed for daily living?\"Check all that apply:  [] A.  Yes, it has kept me from medical appointments or from getting my medications  [] B.  Yes, it has kept me from non-medical meetings, appointments, work, or from getting things that I need  [x] C.  No  [] X.  Patient unable to respond  [] Y.  Patient declines to respond    Provision of Current Reconciled Medication List to Subsequent Provider at Discharge  [] No, current reconciled medication list not provided to the subsequent provider.  [x] Yes, current reconciled medication list provided to the subsequent provider. (**Select route of transmission below**)   [x] Via Electronic Health Record   [x] Via Health Information Exchange Organization  [] Verbal (e.g. in person, telephone, video conferencing)  [] Paper-based (e.g. fax, copies, printouts)   [] Other Methods (e.g. texting, email, CDs)    Provision of Current Reconciled Medication List to Patient at Discharge  [] No, current reconciled medication list not provided to the patient, family and/or caregiver.   [x] Yes, current reconciled medication list provided to the patient, family and/or caregiver.  (**Select route of transmission below**)   [x] Via Electronic Health Record (e.g., electronic access to patient portal)   [x] Via Health Information Exchange Organization  [x] Verbal (e.g. in person, telephone, video conferencing)  [x] Paper-based (e.g. fax, copies, printouts)   [] Other Methods (e.g. texting, email, CDs)    Health Literacy  \"How often do you need to have someone help you when you read instructions, pamphlets, or other written material from your doctor or pharmacy?\"  [x]  0.  Never  []  1.  Rarely  []  2.  Sometimes  []  3.  Often  []  4.  Always  []  7.  Patient declines to respond  []  8.  Patient unable to respond    BIMS - **Must be completed in the

## 2025-05-24 NOTE — PLAN OF CARE
Problem: Chronic Conditions and Co-morbidities  Goal: Patient's chronic conditions and co-morbidity symptoms are monitored and maintained or improved  5/24/2025 0937 by Soheila Pate RN  Outcome: Completed  Flowsheets (Taken 5/24/2025 0834)  Care Plan - Patient's Chronic Conditions and Co-Morbidity Symptoms are Monitored and Maintained or Improved: Monitor and assess patient's chronic conditions and comorbid symptoms for stability, deterioration, or improvement  5/23/2025 2312 by Tony Campos RN  Outcome: Progressing     Problem: Discharge Planning  Goal: Discharge to home or other facility with appropriate resources  5/24/2025 0937 by Soheila Pate RN  Outcome: Completed  Flowsheets (Taken 5/24/2025 0834)  Discharge to home or other facility with appropriate resources:   Identify barriers to discharge with patient and caregiver   Identify discharge learning needs (meds, wound care, etc)  5/23/2025 2312 by Tony Campos RN  Outcome: Progressing     Problem: Pain  Goal: Verbalizes/displays adequate comfort level or baseline comfort level  5/24/2025 0937 by Soheila Pate RN  Outcome: Completed  Flowsheets (Taken 5/24/2025 0834)  Verbalizes/displays adequate comfort level or baseline comfort level:   Encourage patient to monitor pain and request assistance   Assess pain using appropriate pain scale   Administer analgesics based on type and severity of pain and evaluate response   Implement non-pharmacological measures as appropriate and evaluate response   Consider cultural and social influences on pain and pain management  5/23/2025 2312 by Tony Campos RN  Outcome: Progressing     Problem: Safety - Adult  Goal: Free from fall injury  5/24/2025 0937 by Soheila Pate RN  Outcome: Completed  5/23/2025 2312 by Tony Campos RN  Outcome: Progressing     Problem: ABCDS Injury Assessment  Goal: Absence of physical injury  5/24/2025 0937 by Soheila Pate RN  Outcome:

## 2025-05-27 ENCOUNTER — TELEPHONE (OUTPATIENT)
Dept: INTERNAL MEDICINE CLINIC | Age: 40
End: 2025-05-27

## 2025-05-27 NOTE — TELEPHONE ENCOUNTER
Spoke to patient about recent hospitalization - she indicated that she'd changed PCPs to a [facility] MD the Friday before her hospitalization so she will be following up with that provider.    Declined any assistance from this office.

## 2025-05-27 NOTE — TELEPHONE ENCOUNTER
Patient discharged on 5/24 and Nurse Kait will reach out to patient, give verbal orders if needed.

## 2025-07-02 DIAGNOSIS — F33.1 MODERATE EPISODE OF RECURRENT MAJOR DEPRESSIVE DISORDER (HCC): ICD-10-CM

## 2025-07-02 DIAGNOSIS — F41.1 GAD (GENERALIZED ANXIETY DISORDER): ICD-10-CM

## 2025-07-02 RX ORDER — HYDROCHLOROTHIAZIDE 12.5 MG/1
12.5 TABLET ORAL DAILY
Qty: 30 TABLET | OUTPATIENT
Start: 2025-07-02

## 2025-07-02 RX ORDER — SERTRALINE HYDROCHLORIDE 100 MG/1
100 TABLET, FILM COATED ORAL DAILY
Qty: 90 TABLET | Refills: 1 | OUTPATIENT
Start: 2025-07-02 | End: 2025-12-29

## 2025-08-18 ENCOUNTER — HOSPITAL ENCOUNTER (EMERGENCY)
Age: 40
Discharge: HOME | End: 2025-08-18
Payer: MEDICARE

## 2025-08-18 VITALS
OXYGEN SATURATION: 97 % | WEIGHT: 293 LBS | BODY MASS INDEX: 45.99 KG/M2 | SYSTOLIC BLOOD PRESSURE: 133 MMHG | TEMPERATURE: 98.2 F | DIASTOLIC BLOOD PRESSURE: 74 MMHG | HEIGHT: 67 IN | HEART RATE: 100 BPM | RESPIRATION RATE: 16 BRPM

## 2025-08-18 DIAGNOSIS — L02.214 INGUINAL ABSCESS: Primary | ICD-10-CM

## 2025-08-18 PROCEDURE — 6370000000 HC RX 637 (ALT 250 FOR IP): Performed by: PHYSICIAN ASSISTANT

## 2025-08-18 PROCEDURE — 99283 EMERGENCY DEPT VISIT LOW MDM: CPT

## 2025-08-18 PROCEDURE — 10060 I&D ABSCESS SIMPLE/SINGLE: CPT

## 2025-08-18 RX ORDER — OXYCODONE AND ACETAMINOPHEN 5; 325 MG/1; MG/1
1 TABLET ORAL ONCE
Refills: 0 | Status: COMPLETED | OUTPATIENT
Start: 2025-08-18 | End: 2025-08-18

## 2025-08-18 RX ORDER — LIDOCAINE AND PRILOCAINE 25; 25 MG/G; MG/G
CREAM TOPICAL ONCE
Status: DISCONTINUED | OUTPATIENT
Start: 2025-08-18 | End: 2025-08-18 | Stop reason: HOSPADM

## 2025-08-18 RX ORDER — CLINDAMYCIN HYDROCHLORIDE 300 MG/1
300 CAPSULE ORAL 4 TIMES DAILY
Qty: 40 CAPSULE | Refills: 0 | Status: SHIPPED | OUTPATIENT
Start: 2025-08-18 | End: 2025-08-28

## 2025-08-18 RX ORDER — CLINDAMYCIN HYDROCHLORIDE 150 MG/1
300 CAPSULE ORAL ONCE
Status: COMPLETED | OUTPATIENT
Start: 2025-08-18 | End: 2025-08-18

## 2025-08-18 RX ADMIN — CLINDAMYCIN HYDROCHLORIDE 300 MG: 150 CAPSULE ORAL at 11:04

## 2025-08-18 RX ADMIN — OXYCODONE AND ACETAMINOPHEN 1 TABLET: 5; 325 TABLET ORAL at 11:04

## 2025-08-18 ASSESSMENT — ENCOUNTER SYMPTOMS
WHEEZING: 0
COUGH: 0
DIARRHEA: 0
ABDOMINAL PAIN: 0
VOMITING: 0
ROS SKIN COMMENTS: ABSCESS
RHINORRHEA: 0
SHORTNESS OF BREATH: 0
NAUSEA: 0

## 2025-08-18 ASSESSMENT — LIFESTYLE VARIABLES
HOW OFTEN DO YOU HAVE A DRINK CONTAINING ALCOHOL: NEVER
HOW MANY STANDARD DRINKS CONTAINING ALCOHOL DO YOU HAVE ON A TYPICAL DAY: PATIENT DOES NOT DRINK

## 2025-08-18 ASSESSMENT — PAIN - FUNCTIONAL ASSESSMENT
PAIN_FUNCTIONAL_ASSESSMENT: 0-10
PAIN_FUNCTIONAL_ASSESSMENT: 0-10

## 2025-08-18 ASSESSMENT — PAIN SCALES - GENERAL
PAINLEVEL_OUTOF10: 9
PAINLEVEL_OUTOF10: 9